# Patient Record
Sex: FEMALE | Race: WHITE | NOT HISPANIC OR LATINO | Employment: FULL TIME | URBAN - METROPOLITAN AREA
[De-identification: names, ages, dates, MRNs, and addresses within clinical notes are randomized per-mention and may not be internally consistent; named-entity substitution may affect disease eponyms.]

---

## 2019-01-31 ENCOUNTER — HOSPITAL ENCOUNTER (OUTPATIENT)
Dept: OTHER | Facility: HOSPITAL | Age: 39
Setting detail: RECURRING SERIES
Discharge: HOME | End: 2019-03-14
Attending: ORTHOPAEDIC SURGERY | Admitting: ORTHOPAEDIC SURGERY

## 2019-05-10 ENCOUNTER — TELEPHONE (OUTPATIENT)
Dept: CARDIOLOGY | Facility: CLINIC | Age: 39
End: 2019-05-10

## 2019-05-10 NOTE — TELEPHONE ENCOUNTER
Patient called stating she spoke with Dr. Felix at her last visit about starting adhd medication. She states he told her either adderrall or strattera would be ok to start. She needs the ok faxed to Carrie Cortez at [phone]  ATTN Carrie Cortez     Patients [phone]

## 2020-01-13 ENCOUNTER — OFFICE VISIT (OUTPATIENT)
Dept: CARDIOLOGY | Facility: CLINIC | Age: 40
End: 2020-01-13

## 2020-01-13 VITALS
SYSTOLIC BLOOD PRESSURE: 132 MMHG | WEIGHT: 188 LBS | HEART RATE: 101 BPM | DIASTOLIC BLOOD PRESSURE: 93 MMHG | OXYGEN SATURATION: 98 %

## 2020-01-13 DIAGNOSIS — F90.8 ATTENTION DEFICIT HYPERACTIVITY DISORDER (ADHD), OTHER TYPE: ICD-10-CM

## 2020-01-13 DIAGNOSIS — R00.2 PALPITATIONS: Primary | ICD-10-CM

## 2020-01-13 DIAGNOSIS — I47.1 ATRIAL TACHYCARDIA (HCC): ICD-10-CM

## 2020-01-13 DIAGNOSIS — E78.2 MIXED HYPERLIPIDEMIA: ICD-10-CM

## 2020-01-13 PROBLEM — E78.5 HYPERLIPIDEMIA: Status: ACTIVE | Noted: 2020-01-13

## 2020-01-13 PROBLEM — E11.42 DIABETIC PERIPHERAL NEUROPATHY: Status: ACTIVE | Noted: 2020-01-13

## 2020-01-13 PROBLEM — I10 HYPERTENSION: Status: ACTIVE | Noted: 2020-01-13

## 2020-01-13 PROBLEM — E80.20 PORPHYRIA: Status: ACTIVE | Noted: 2018-02-10

## 2020-01-13 PROBLEM — E11.9 TYPE 2 DIABETES MELLITUS (HCC): Status: ACTIVE | Noted: 2020-01-13

## 2020-01-13 PROCEDURE — 93000 ELECTROCARDIOGRAM COMPLETE: CPT | Performed by: INTERNAL MEDICINE

## 2020-01-13 PROCEDURE — 99204 OFFICE O/P NEW MOD 45 MIN: CPT | Performed by: INTERNAL MEDICINE

## 2020-01-13 RX ORDER — SIMVASTATIN 10 MG
10 TABLET ORAL DAILY
COMMUNITY

## 2020-01-13 RX ORDER — TOPIRAMATE 100 MG/1
CAPSULE, EXTENDED RELEASE ORAL
COMMUNITY
Start: 2020-01-06

## 2020-01-13 RX ORDER — NABUMETONE 750 MG/1
TABLET, FILM COATED ORAL
COMMUNITY
Start: 2020-01-05

## 2020-01-13 RX ORDER — ALBUTEROL SULFATE 90 UG/1
2 AEROSOL, METERED RESPIRATORY (INHALATION)
COMMUNITY
Start: 2019-03-27

## 2020-01-13 RX ORDER — TOPIRAMATE 100 MG/1
CAPSULE, EXTENDED RELEASE ORAL
COMMUNITY

## 2020-01-13 RX ORDER — IBUPROFEN 200 MG
950 CAPSULE ORAL
COMMUNITY
Start: 2018-12-14

## 2020-01-13 RX ORDER — CYCLOBENZAPRINE HCL 10 MG
TABLET ORAL
COMMUNITY
Start: 2019-12-26

## 2020-01-13 RX ORDER — LISINOPRIL 5 MG/1
TABLET ORAL
COMMUNITY
Start: 2019-10-25

## 2020-01-13 RX ORDER — SITAGLIPTIN AND METFORMIN HYDROCHLORIDE 1000; 50 MG/1; MG/1
TABLET, FILM COATED ORAL
COMMUNITY
Start: 2019-12-27

## 2020-01-13 RX ORDER — GABAPENTIN 100 MG/1
CAPSULE ORAL 3 TIMES DAILY
COMMUNITY
Start: 2019-12-29

## 2020-01-13 RX ORDER — METHYLPHENIDATE HYDROCHLORIDE 27 MG/1
27 TABLET ORAL EVERY MORNING
COMMUNITY

## 2020-01-13 NOTE — PROGRESS NOTES
CC--palpitations and fatigue    Sub--39-year-old female patient has history of palpitations and fatigue and underwent a prior EP study and was felt to have poorly functioning sinus node and from her description looks like she had  attempted EP study for sinus node modification which was not done as per her description  She has history of ADHD, hypertension and diabetes  She also has porphyria cutanea tarda  She has symptoms of peripheral neuropathy on gabapentin  She also has hyperlipidemia  She denies any edema of her feet or syncope or unusual angina or dyspnea  She is awaiting orthopedic surgery for her shoulder        Past Medical History:   Diagnosis Date   • DDD (degenerative disc disease), cervical    • Diabetes mellitus (CMS/HCC)    • PCOS (polycystic ovarian syndrome)      Past Surgical History:   Procedure Laterality Date   • CERVICAL SPINE SURGERY       Family History   Problem Relation Age of Onset   • No Known Problems Mother    • No Known Problems Father    • No Known Problems Sister    • No Known Problems Brother    • No Known Problems Maternal Aunt    • No Known Problems Maternal Uncle    • No Known Problems Paternal Aunt    • No Known Problems Paternal Uncle    • No Known Problems Maternal Grandmother    • No Known Problems Maternal Grandfather    • No Known Problems Paternal Grandmother    • No Known Problems Paternal Grandfather    • No Known Problems Other    • Anemia Neg Hx    • Arrhythmia Neg Hx    • Asthma Neg Hx    • Clotting disorder Neg Hx    • Fainting Neg Hx    • Heart attack Neg Hx    • Heart disease Neg Hx    • Heart failure Neg Hx    • Hyperlipidemia Neg Hx    • Hypertension Neg Hx      Social History     Tobacco Use   • Smoking status: Former Smoker     Types: Electronic Cigarette   • Smokeless tobacco: Never Used   Substance Use Topics   • Alcohol use: Yes   • Drug use: Never       (Not in a hospital admission)  Allergies:  Adhesive tape    Review of Systems   General:  positive for  fatigue and tiredness  Eyes: No redness  Cardiovascular: No chest pain, no palpitations  Respiratory:   no shortness of breath  Gastrointestinal: No nausea or vomiting, bleeding  Genitourinary: no hematuria or dysuria  Musculoskeletal: No arthralgia or myalgia  Skin: No rash  Neurologic: No numbness, tingling, syncope  Hematologic/Lymphatic: No abnormal bleeding      Physical Exam  VITALS REVIEWED--blood pressure 132/93 pulse rate is 101 patient is afebrile respiration 12 times a minute  EKG shows low atrial rhythm heart rate of 101 VA interval 137 QRS of 73 QTC of 424, no significant EKG changes compared to prior EKG and EKG indication includes history of atrial arrhythmias and fatigue    General:      well developed, well nourished, in no acute distress.    Head:      normocephalic and atraumatic.    Eyes:      PERRL/EOM intact, conjunctiva and sclera clear with out nystagmus.    Neck:      no masses, thyromegaly,  trachea central with normal respiratory effort and PMI displaced laterally  Lungs:      clear bilaterally to auscultation.    Heart:       Regular rhythm  without any murmurs gallops or rubs  Msk:      no deformity or scoliosis noted of thoracic or lumbar spine.    Pulses:      pulses normal in all 4 extremities.    Extremities:       no cyanosis or clubbing--trace left pedal edema and trace right pedal edema.    Neurologic:      no focal deficits.   alert oriented x3  Skin:      intact without lesions or rashes.    Psych:      alert and cooperative; normal mood and affect; normal attention span and concentration.        Assessment plan    EKG consistent with low atrial rhythm likely from sinus node dysfunction  Patient may have history of inappropriate sinus tachycardia in the past  She had a prior EP study and was found to have sinus node dysfunction as per her description and henceforth has a low atrial rhythm  No other cardiac symptoms apart from fatigue  Patient reassured  Patient is cleared for  noncardiac surgery and she is low risk for any perioperative complications  History of ADHD  History of diabetes and peripheral neuropathy  History of porphyria cutanea tarda    Electronically signed by Mando Phillips MD, 01/13/20, 5:17 PM.

## 2020-08-05 ENCOUNTER — OFFICE (AMBULATORY)
Dept: URBAN - METROPOLITAN AREA CLINIC 64 | Facility: CLINIC | Age: 40
End: 2020-08-05

## 2020-08-05 VITALS
SYSTOLIC BLOOD PRESSURE: 127 MMHG | HEART RATE: 104 BPM | WEIGHT: 176 LBS | DIASTOLIC BLOOD PRESSURE: 89 MMHG | HEIGHT: 70 IN

## 2020-08-05 DIAGNOSIS — R19.4 CHANGE IN BOWEL HABIT: ICD-10-CM

## 2020-08-05 DIAGNOSIS — K59.00 CONSTIPATION, UNSPECIFIED: ICD-10-CM

## 2020-08-05 DIAGNOSIS — D64.9 ANEMIA, UNSPECIFIED: ICD-10-CM

## 2020-08-05 DIAGNOSIS — E80.1 PORPHYRIA CUTANEA TARDA: ICD-10-CM

## 2020-08-05 DIAGNOSIS — R11.0 NAUSEA: ICD-10-CM

## 2020-08-05 DIAGNOSIS — E11.9 TYPE 2 DIABETES MELLITUS WITHOUT COMPLICATIONS: ICD-10-CM

## 2020-08-05 DIAGNOSIS — Z20.5 CONTACT WITH AND (SUSPECTED) EXPOSURE TO VIRAL HEPATITIS: ICD-10-CM

## 2020-08-05 PROCEDURE — 99244 OFF/OP CNSLTJ NEW/EST MOD 40: CPT | Performed by: INTERNAL MEDICINE

## 2020-08-07 ENCOUNTER — OFFICE VISIT (OUTPATIENT)
Dept: CARDIOLOGY | Facility: CLINIC | Age: 40
End: 2020-08-07

## 2020-08-07 VITALS
WEIGHT: 176 LBS | HEART RATE: 91 BPM | DIASTOLIC BLOOD PRESSURE: 88 MMHG | SYSTOLIC BLOOD PRESSURE: 129 MMHG | OXYGEN SATURATION: 100 %

## 2020-08-07 DIAGNOSIS — I47.1 ATRIAL TACHYCARDIA (HCC): Primary | ICD-10-CM

## 2020-08-07 DIAGNOSIS — R00.2 PALPITATIONS: ICD-10-CM

## 2020-08-07 DIAGNOSIS — E78.2 MIXED HYPERLIPIDEMIA: ICD-10-CM

## 2020-08-07 PROCEDURE — 93000 ELECTROCARDIOGRAM COMPLETE: CPT | Performed by: INTERNAL MEDICINE

## 2020-08-07 PROCEDURE — 99213 OFFICE O/P EST LOW 20 MIN: CPT | Performed by: INTERNAL MEDICINE

## 2020-08-07 RX ORDER — ASCORBIC ACID 500 MG
500 TABLET ORAL DAILY
COMMUNITY

## 2020-08-07 RX ORDER — PRENATAL VIT/IRON FUM/FOLIC AC 27MG-0.8MG
TABLET ORAL DAILY
COMMUNITY

## 2020-08-07 RX ORDER — NAPROXEN SODIUM 220 MG
220 TABLET ORAL 2 TIMES DAILY PRN
COMMUNITY

## 2020-08-07 NOTE — PROGRESS NOTES
CC--palpitations and fatigue    Sub--40-year-old female patient has history of palpitations and fatigue and underwent a prior EP study and was felt to have poorly functioning sinus node and from her description looks like she had  attempted EP study for sinus node modification which was not done as per her description--she comes in for 6-month follow-up with no new symptoms  She has history of ADHD, hypertension and diabetes  She also has porphyria cutanea tarda  She has symptoms of peripheral neuropathy on gabapentin  She also has hyperlipidemia  She denies any edema of her feet or syncope or unusual angina or dyspnea  She is awaiting orthopedic surgery for her shoulder  Patient denies any syncope or unusual dyspnea and has mild dependent edema with current intake of medications  She has some nonspecific GI symptoms and is awaiting a colonoscopy  Patient has been a diabetic for 24 years        Past Medical History:   Diagnosis Date   • DDD (degenerative disc disease), cervical    • Diabetes mellitus (CMS/HCC)    • PCOS (polycystic ovarian syndrome)      Past Surgical History:   Procedure Laterality Date   • CERVICAL SPINE SURGERY       Family History   Problem Relation Age of Onset   • No Known Problems Mother    • No Known Problems Father    • No Known Problems Sister    • No Known Problems Brother    • No Known Problems Maternal Aunt    • No Known Problems Maternal Uncle    • No Known Problems Paternal Aunt    • No Known Problems Paternal Uncle    • No Known Problems Maternal Grandmother    • No Known Problems Maternal Grandfather    • No Known Problems Paternal Grandmother    • No Known Problems Paternal Grandfather    • No Known Problems Other    • Anemia Neg Hx    • Arrhythmia Neg Hx    • Asthma Neg Hx    • Clotting disorder Neg Hx    • Fainting Neg Hx    • Heart attack Neg Hx    • Heart disease Neg Hx    • Heart failure Neg Hx    • Hyperlipidemia Neg Hx    • Hypertension Neg Hx        Review of Systems    General:  positive for fatigue and tiredness  Eyes: No redness  Cardiovascular: No chest pain, no palpitations  Respiratory:   no shortness of breath  Gastrointestinal: No nausea or vomiting, bleeding  Genitourinary: no hematuria or dysuria  Musculoskeletal: No arthralgia or myalgia  Skin: No rash  Neurologic: No numbness, tingling, syncope  Hematologic/Lymphatic: No abnormal bleeding      Physical Exam  VITALS REVIEWED--blood pressure 129/88 pulse rate is 91 patient is afebrile respiration 12 times a minute  EKG shows low atrial rhythm heart rate of 89 NE interval 113 QRS of 94 QTC of 447, no significant EKG changes compared to prior EKG and EKG indication includes history of atrial arrhythmias and fatigue    General:      well developed, well nourished, in no acute distress.    Head:      normocephalic and atraumatic.    Eyes:      PERRL/EOM intact, conjunctiva and sclera clear with out nystagmus.    Neck:      no masses, thyromegaly,  trachea central with normal respiratory effort and PMI displaced laterally  Lungs:      clear bilaterally to auscultation.    Heart:       Regular rhythm  without any murmurs gallops or rubs  Msk:      no deformity or scoliosis noted of thoracic or lumbar spine.    Pulses:      pulses normal in all 4 extremities.    Extremities:       no cyanosis or clubbing--trace left pedal edema and trace right pedal edema.    Neurologic:      no focal deficits.   alert oriented x3  Skin:      intact without lesions or rashes.    Psych:      alert and cooperative; normal mood and affect; normal attention span and concentration.        Assessment plan    EKG consistent with low atrial rhythm likely from sinus node dysfunction  Patient may have history of inappropriate sinus tachycardia in the past  She had a prior EP study and was found to have sinus node dysfunction as per her description and henceforth has a low atrial rhythm  No other cardiac symptoms apart from fatigue  Patient  reassured  Patient is cleared for noncardiac procedures like colonoscopy   History of ADHD  History of diabetes and peripheral neuropathy  History of porphyria cutanea tarda  Follow-up in 1 year    Electronically signed by Mando Phillips MD, 08/07/20, 10:16 AM.

## 2020-08-31 ENCOUNTER — ON CAMPUS - OUTPATIENT (AMBULATORY)
Dept: URBAN - METROPOLITAN AREA HOSPITAL 2 | Facility: HOSPITAL | Age: 40
End: 2020-08-31

## 2020-08-31 ENCOUNTER — OFFICE (AMBULATORY)
Dept: URBAN - METROPOLITAN AREA PATHOLOGY 4 | Facility: PATHOLOGY | Age: 40
End: 2020-08-31
Payer: COMMERCIAL

## 2020-08-31 VITALS
SYSTOLIC BLOOD PRESSURE: 104 MMHG | HEART RATE: 112 BPM | DIASTOLIC BLOOD PRESSURE: 63 MMHG | HEART RATE: 110 BPM | DIASTOLIC BLOOD PRESSURE: 68 MMHG | HEART RATE: 123 BPM | OXYGEN SATURATION: 100 % | DIASTOLIC BLOOD PRESSURE: 79 MMHG | SYSTOLIC BLOOD PRESSURE: 116 MMHG | RESPIRATION RATE: 16 BRPM | SYSTOLIC BLOOD PRESSURE: 103 MMHG | DIASTOLIC BLOOD PRESSURE: 82 MMHG | SYSTOLIC BLOOD PRESSURE: 98 MMHG | SYSTOLIC BLOOD PRESSURE: 109 MMHG | TEMPERATURE: 97.7 F | SYSTOLIC BLOOD PRESSURE: 122 MMHG | HEART RATE: 111 BPM | HEIGHT: 70 IN | SYSTOLIC BLOOD PRESSURE: 113 MMHG | OXYGEN SATURATION: 98 % | RESPIRATION RATE: 18 BRPM | OXYGEN SATURATION: 99 % | DIASTOLIC BLOOD PRESSURE: 67 MMHG | SYSTOLIC BLOOD PRESSURE: 107 MMHG | DIASTOLIC BLOOD PRESSURE: 77 MMHG | DIASTOLIC BLOOD PRESSURE: 60 MMHG | SYSTOLIC BLOOD PRESSURE: 102 MMHG | WEIGHT: 170 LBS | HEART RATE: 113 BPM | OXYGEN SATURATION: 96 % | DIASTOLIC BLOOD PRESSURE: 80 MMHG | HEART RATE: 114 BPM | RESPIRATION RATE: 20 BRPM | HEART RATE: 96 BPM

## 2020-08-31 DIAGNOSIS — D64.9 ANEMIA, UNSPECIFIED: ICD-10-CM

## 2020-08-31 DIAGNOSIS — K64.0 FIRST DEGREE HEMORRHOIDS: ICD-10-CM

## 2020-08-31 DIAGNOSIS — R19.4 CHANGE IN BOWEL HABIT: ICD-10-CM

## 2020-08-31 DIAGNOSIS — K59.00 CONSTIPATION, UNSPECIFIED: ICD-10-CM

## 2020-08-31 DIAGNOSIS — K20.8 OTHER ESOPHAGITIS: ICD-10-CM

## 2020-08-31 DIAGNOSIS — K31.89 OTHER DISEASES OF STOMACH AND DUODENUM: ICD-10-CM

## 2020-08-31 DIAGNOSIS — R11.0 NAUSEA: ICD-10-CM

## 2020-08-31 LAB
GI HISTOLOGY: A. UNSPECIFIED: (no result)
GI HISTOLOGY: B. SELECT: (no result)
GI HISTOLOGY: PDF REPORT: (no result)

## 2020-08-31 PROCEDURE — 88305 TISSUE EXAM BY PATHOLOGIST: CPT | Performed by: INTERNAL MEDICINE

## 2020-08-31 PROCEDURE — 43239 EGD BIOPSY SINGLE/MULTIPLE: CPT | Performed by: INTERNAL MEDICINE

## 2020-08-31 PROCEDURE — 88342 IMHCHEM/IMCYTCHM 1ST ANTB: CPT | Performed by: INTERNAL MEDICINE

## 2020-08-31 PROCEDURE — 45378 DIAGNOSTIC COLONOSCOPY: CPT | Performed by: INTERNAL MEDICINE

## 2020-08-31 RX ORDER — PANTOPRAZOLE SODIUM 40 MG/1
40 TABLET, DELAYED RELEASE ORAL
Qty: 90 | Refills: 3 | Status: ACTIVE
Start: 2020-08-31

## 2021-10-28 ENCOUNTER — TRANSCRIBE ORDERS (OUTPATIENT)
Dept: ADMINISTRATIVE | Facility: HOSPITAL | Age: 41
End: 2021-10-28

## 2021-10-28 DIAGNOSIS — Z12.31 VISIT FOR SCREENING MAMMOGRAM: Primary | ICD-10-CM

## 2023-09-12 ENCOUNTER — TRANSCRIBE ORDERS (OUTPATIENT)
Dept: ADMINISTRATIVE | Facility: HOSPITAL | Age: 43
End: 2023-09-12
Payer: COMMERCIAL

## 2023-09-12 ENCOUNTER — HOSPITAL ENCOUNTER (OUTPATIENT)
Dept: CT IMAGING | Facility: HOSPITAL | Age: 43
Discharge: HOME | End: 2023-09-12
Payer: COMMERCIAL

## 2023-09-12 DIAGNOSIS — R79.89 HYPOURICEMIA: Primary | ICD-10-CM

## 2023-09-12 DIAGNOSIS — R79.89 HYPOURICEMIA: ICD-10-CM

## 2023-09-12 LAB
CREAT BLDA-MCNC: 0.5 MG/DL (ref 0.6–1.3)
EGFRCR SERPLBLD CKD-EPI 2021: 118 ML/MIN/1.73

## 2023-09-12 PROCEDURE — 25510000001 IOPAMIDOL PER 1 ML

## 2023-09-12 PROCEDURE — 71275 CT ANGIOGRAPHY CHEST: CPT

## 2023-09-12 PROCEDURE — 82565 ASSAY OF CREATININE: CPT

## 2023-09-12 RX ADMIN — IOPAMIDOL 100 ML: 755 INJECTION, SOLUTION INTRAVENOUS at 11:26

## 2023-09-17 ENCOUNTER — HOSPITAL ENCOUNTER (OUTPATIENT)
Facility: HOSPITAL | Age: 43
Setting detail: OBSERVATION
Discharge: HOME | End: 2023-09-20
Attending: EMERGENCY MEDICINE | Admitting: INTERNAL MEDICINE
Payer: COMMERCIAL

## 2023-09-17 DIAGNOSIS — E10.65 TYPE 1 DIABETES MELLITUS WITH HYPERGLYCEMIA: Primary | ICD-10-CM

## 2023-09-17 DIAGNOSIS — N20.0 KIDNEY STONE: ICD-10-CM

## 2023-09-17 DIAGNOSIS — N39.0 ACUTE URINARY TRACT INFECTION: ICD-10-CM

## 2023-09-17 DIAGNOSIS — L25.9 CONTACT DERMATITIS, UNSPECIFIED CONTACT DERMATITIS TYPE, UNSPECIFIED TRIGGER: ICD-10-CM

## 2023-09-17 PROBLEM — E87.6 HYPOKALEMIA: Status: ACTIVE | Noted: 2023-09-17

## 2023-09-17 PROBLEM — E11.65 DIABETES MELLITUS WITH HYPERGLYCEMIA: Status: ACTIVE | Noted: 2023-09-17

## 2023-09-17 LAB
ALBUMIN SERPL-MCNC: 4.4 G/DL (ref 3.5–5.2)
ALBUMIN/GLOB SERPL: 1.6 G/DL
ALP SERPL-CCNC: 66 U/L (ref 39–117)
ALT SERPL W P-5'-P-CCNC: 11 U/L (ref 1–33)
ANION GAP SERPL CALCULATED.3IONS-SCNC: 14 MMOL/L (ref 5–15)
AST SERPL-CCNC: 11 U/L (ref 1–32)
BACTERIA UR QL AUTO: ABNORMAL /HPF
BASOPHILS # BLD AUTO: 0.1 10*3/MM3 (ref 0–0.2)
BASOPHILS NFR BLD AUTO: 0.8 % (ref 0–1.5)
BILIRUB SERPL-MCNC: 0.2 MG/DL (ref 0–1.2)
BILIRUB UR QL STRIP: NEGATIVE
BUN SERPL-MCNC: 19 MG/DL (ref 6–20)
BUN/CREAT SERPL: 25 (ref 7–25)
CALCIUM SPEC-SCNC: 9.7 MG/DL (ref 8.6–10.5)
CHLORIDE SERPL-SCNC: 101 MMOL/L (ref 98–107)
CLARITY UR: CLEAR
CO2 SERPL-SCNC: 23 MMOL/L (ref 22–29)
COLOR UR: YELLOW
CREAT SERPL-MCNC: 0.76 MG/DL (ref 0.57–1)
DEPRECATED RDW RBC AUTO: 47.7 FL (ref 37–54)
EGFRCR SERPLBLD CKD-EPI 2021: 98.6 ML/MIN/1.73
EOSINOPHIL # BLD AUTO: 0 10*3/MM3 (ref 0–0.4)
EOSINOPHIL NFR BLD AUTO: 0.1 % (ref 0.3–6.2)
ERYTHROCYTE [DISTWIDTH] IN BLOOD BY AUTOMATED COUNT: 15.5 % (ref 12.3–15.4)
GLOBULIN UR ELPH-MCNC: 2.8 GM/DL
GLUCOSE SERPL-MCNC: 366 MG/DL (ref 65–99)
GLUCOSE UR STRIP-MCNC: ABNORMAL MG/DL
HBA1C MFR BLD: 10.1 % (ref 4.8–5.6)
HCT VFR BLD AUTO: 36.2 % (ref 34–46.6)
HGB BLD-MCNC: 11.8 G/DL (ref 12–15.9)
HGB UR QL STRIP.AUTO: ABNORMAL
HYALINE CASTS UR QL AUTO: ABNORMAL /LPF
KETONES UR QL STRIP: ABNORMAL
LEUKOCYTE ESTERASE UR QL STRIP.AUTO: ABNORMAL
LYMPHOCYTES # BLD AUTO: 0.8 10*3/MM3 (ref 0.7–3.1)
LYMPHOCYTES NFR BLD AUTO: 7.1 % (ref 19.6–45.3)
MCH RBC QN AUTO: 26.7 PG (ref 26.6–33)
MCHC RBC AUTO-ENTMCNC: 32.5 G/DL (ref 31.5–35.7)
MCV RBC AUTO: 81.9 FL (ref 79–97)
MONOCYTES # BLD AUTO: 0.4 10*3/MM3 (ref 0.1–0.9)
MONOCYTES NFR BLD AUTO: 3.8 % (ref 5–12)
NEUTROPHILS NFR BLD AUTO: 10.2 10*3/MM3 (ref 1.7–7)
NEUTROPHILS NFR BLD AUTO: 88.2 % (ref 42.7–76)
NITRITE UR QL STRIP: NEGATIVE
NRBC BLD AUTO-RTO: 0 /100 WBC (ref 0–0.2)
PH UR STRIP.AUTO: 7.5 [PH] (ref 5–8)
PLATELET # BLD AUTO: 553 10*3/MM3 (ref 140–450)
PMV BLD AUTO: 7.8 FL (ref 6–12)
POTASSIUM SERPL-SCNC: 3.4 MMOL/L (ref 3.5–5.2)
PROT SERPL-MCNC: 7.2 G/DL (ref 6–8.5)
PROT UR QL STRIP: ABNORMAL
RBC # BLD AUTO: 4.42 10*6/MM3 (ref 3.77–5.28)
RBC # UR STRIP: ABNORMAL /HPF
REF LAB TEST METHOD: ABNORMAL
SODIUM SERPL-SCNC: 138 MMOL/L (ref 136–145)
SP GR UR STRIP: 1.03 (ref 1–1.03)
SQUAMOUS #/AREA URNS HPF: ABNORMAL /HPF
UROBILINOGEN UR QL STRIP: ABNORMAL
WBC # UR STRIP: ABNORMAL /HPF
WBC NRBC COR # BLD: 11.5 10*3/MM3 (ref 3.4–10.8)

## 2023-09-17 PROCEDURE — 63710000001 INSULIN LISPRO (HUMAN) PER 5 UNITS: Performed by: EMERGENCY MEDICINE

## 2023-09-17 PROCEDURE — 87077 CULTURE AEROBIC IDENTIFY: CPT | Performed by: EMERGENCY MEDICINE

## 2023-09-17 PROCEDURE — 81001 URINALYSIS AUTO W/SCOPE: CPT | Performed by: EMERGENCY MEDICINE

## 2023-09-17 PROCEDURE — 80053 COMPREHEN METABOLIC PANEL: CPT | Performed by: EMERGENCY MEDICINE

## 2023-09-17 PROCEDURE — G0378 HOSPITAL OBSERVATION PER HR: HCPCS

## 2023-09-17 PROCEDURE — 86431 RHEUMATOID FACTOR QUANT: CPT | Performed by: EMERGENCY MEDICINE

## 2023-09-17 PROCEDURE — 87086 URINE CULTURE/COLONY COUNT: CPT | Performed by: EMERGENCY MEDICINE

## 2023-09-17 PROCEDURE — 87186 SC STD MICRODIL/AGAR DIL: CPT | Performed by: EMERGENCY MEDICINE

## 2023-09-17 PROCEDURE — 83036 HEMOGLOBIN GLYCOSYLATED A1C: CPT | Performed by: EMERGENCY MEDICINE

## 2023-09-17 PROCEDURE — 25010000002 METHYLPREDNISOLONE PER 125 MG: Performed by: EMERGENCY MEDICINE

## 2023-09-17 PROCEDURE — 96375 TX/PRO/DX INJ NEW DRUG ADDON: CPT

## 2023-09-17 PROCEDURE — 96374 THER/PROPH/DIAG INJ IV PUSH: CPT

## 2023-09-17 PROCEDURE — 86665 EPSTEIN-BARR CAPSID VCA: CPT | Performed by: EMERGENCY MEDICINE

## 2023-09-17 PROCEDURE — 25010000002 DIPHENHYDRAMINE PER 50 MG: Performed by: EMERGENCY MEDICINE

## 2023-09-17 PROCEDURE — 86038 ANTINUCLEAR ANTIBODIES: CPT | Performed by: EMERGENCY MEDICINE

## 2023-09-17 PROCEDURE — 85025 COMPLETE CBC W/AUTO DIFF WBC: CPT | Performed by: EMERGENCY MEDICINE

## 2023-09-17 PROCEDURE — 99284 EMERGENCY DEPT VISIT MOD MDM: CPT

## 2023-09-17 RX ORDER — FAMOTIDINE 10 MG/ML
20 INJECTION, SOLUTION INTRAVENOUS ONCE
Status: COMPLETED | OUTPATIENT
Start: 2023-09-17 | End: 2023-09-17

## 2023-09-17 RX ORDER — DIPHENHYDRAMINE HYDROCHLORIDE 50 MG/ML
50 INJECTION INTRAMUSCULAR; INTRAVENOUS ONCE
Status: COMPLETED | OUTPATIENT
Start: 2023-09-17 | End: 2023-09-17

## 2023-09-17 RX ORDER — METHYLPREDNISOLONE SODIUM SUCCINATE 125 MG/2ML
60 INJECTION, POWDER, LYOPHILIZED, FOR SOLUTION INTRAMUSCULAR; INTRAVENOUS ONCE
Status: COMPLETED | OUTPATIENT
Start: 2023-09-17 | End: 2023-09-17

## 2023-09-17 RX ORDER — POTASSIUM CHLORIDE 20 MEQ/1
40 TABLET, EXTENDED RELEASE ORAL ONCE
Status: COMPLETED | OUTPATIENT
Start: 2023-09-17 | End: 2023-09-17

## 2023-09-17 RX ORDER — INSULIN LISPRO 100 [IU]/ML
12 INJECTION, SOLUTION INTRAVENOUS; SUBCUTANEOUS ONCE
Status: COMPLETED | OUTPATIENT
Start: 2023-09-17 | End: 2023-09-17

## 2023-09-17 RX ADMIN — DIPHENHYDRAMINE HYDROCHLORIDE 50 MG: 50 INJECTION, SOLUTION INTRAMUSCULAR; INTRAVENOUS at 21:37

## 2023-09-17 RX ADMIN — POTASSIUM CHLORIDE 40 MEQ: 1500 TABLET, EXTENDED RELEASE ORAL at 23:50

## 2023-09-17 RX ADMIN — INSULIN LISPRO 12 UNITS: 100 INJECTION, SOLUTION INTRAVENOUS; SUBCUTANEOUS at 23:50

## 2023-09-17 RX ADMIN — FAMOTIDINE 20 MG: 10 INJECTION INTRAVENOUS at 21:37

## 2023-09-17 RX ADMIN — METHYLPREDNISOLONE SODIUM SUCCINATE 60 MG: 125 INJECTION, POWDER, FOR SOLUTION INTRAMUSCULAR; INTRAVENOUS at 23:49

## 2023-09-18 ENCOUNTER — APPOINTMENT (OUTPATIENT)
Dept: CT IMAGING | Facility: HOSPITAL | Age: 43
End: 2023-09-18
Payer: COMMERCIAL

## 2023-09-18 LAB
ANA SER QL: NEGATIVE
ANION GAP SERPL CALCULATED.3IONS-SCNC: 13 MMOL/L (ref 5–15)
BASOPHILS # BLD AUTO: 0.1 10*3/MM3 (ref 0–0.2)
BASOPHILS NFR BLD AUTO: 0.9 % (ref 0–1.5)
BUN SERPL-MCNC: 21 MG/DL (ref 6–20)
BUN/CREAT SERPL: 31.3 (ref 7–25)
CALCIUM SPEC-SCNC: 9 MG/DL (ref 8.6–10.5)
CHLORIDE SERPL-SCNC: 106 MMOL/L (ref 98–107)
CHROMATIN AB SERPL-ACNC: <10 IU/ML (ref 0–14)
CO2 SERPL-SCNC: 24 MMOL/L (ref 22–29)
CREAT SERPL-MCNC: 0.67 MG/DL (ref 0.57–1)
DEPRECATED RDW RBC AUTO: 45.1 FL (ref 37–54)
EGFRCR SERPLBLD CKD-EPI 2021: 110 ML/MIN/1.73
EOSINOPHIL # BLD AUTO: 0 10*3/MM3 (ref 0–0.4)
EOSINOPHIL NFR BLD AUTO: 0.2 % (ref 0.3–6.2)
ERYTHROCYTE [DISTWIDTH] IN BLOOD BY AUTOMATED COUNT: 14.8 % (ref 12.3–15.4)
GLUCOSE BLDC GLUCOMTR-MCNC: 163 MG/DL (ref 70–105)
GLUCOSE BLDC GLUCOMTR-MCNC: 275 MG/DL (ref 70–105)
GLUCOSE BLDC GLUCOMTR-MCNC: 315 MG/DL (ref 70–105)
GLUCOSE BLDC GLUCOMTR-MCNC: 93 MG/DL (ref 70–105)
GLUCOSE SERPL-MCNC: 201 MG/DL (ref 65–99)
HCT VFR BLD AUTO: 34.1 % (ref 34–46.6)
HGB BLD-MCNC: 11 G/DL (ref 12–15.9)
LYMPHOCYTES # BLD AUTO: 1.5 10*3/MM3 (ref 0.7–3.1)
LYMPHOCYTES NFR BLD AUTO: 11.9 % (ref 19.6–45.3)
MCH RBC QN AUTO: 26.5 PG (ref 26.6–33)
MCHC RBC AUTO-ENTMCNC: 32.3 G/DL (ref 31.5–35.7)
MCV RBC AUTO: 82.1 FL (ref 79–97)
MONOCYTES # BLD AUTO: 0.3 10*3/MM3 (ref 0.1–0.9)
MONOCYTES NFR BLD AUTO: 2.6 % (ref 5–12)
NEUTROPHILS NFR BLD AUTO: 10.9 10*3/MM3 (ref 1.7–7)
NEUTROPHILS NFR BLD AUTO: 84.4 % (ref 42.7–76)
NRBC BLD AUTO-RTO: 0.1 /100 WBC (ref 0–0.2)
PLATELET # BLD AUTO: 545 10*3/MM3 (ref 140–450)
PMV BLD AUTO: 8.1 FL (ref 6–12)
POTASSIUM SERPL-SCNC: 3.7 MMOL/L (ref 3.5–5.2)
RBC # BLD AUTO: 4.16 10*6/MM3 (ref 3.77–5.28)
SODIUM SERPL-SCNC: 143 MMOL/L (ref 136–145)
WBC NRBC COR # BLD: 13 10*3/MM3 (ref 3.4–10.8)

## 2023-09-18 PROCEDURE — 82948 REAGENT STRIP/BLOOD GLUCOSE: CPT

## 2023-09-18 PROCEDURE — 25010000002 CEFTRIAXONE PER 250 MG

## 2023-09-18 PROCEDURE — 63710000001 INSULIN LISPRO (HUMAN) PER 5 UNITS: Performed by: INTERNAL MEDICINE

## 2023-09-18 PROCEDURE — 80048 BASIC METABOLIC PNL TOTAL CA: CPT

## 2023-09-18 PROCEDURE — 85025 COMPLETE CBC W/AUTO DIFF WBC: CPT

## 2023-09-18 PROCEDURE — 25010000002 LEVOFLOXACIN PER 250 MG: Performed by: INTERNAL MEDICINE

## 2023-09-18 PROCEDURE — 25010000002 DIPHENHYDRAMINE PER 50 MG

## 2023-09-18 PROCEDURE — 63710000001 INSULIN GLARGINE PER 5 UNITS: Performed by: INTERNAL MEDICINE

## 2023-09-18 PROCEDURE — G0378 HOSPITAL OBSERVATION PER HR: HCPCS

## 2023-09-18 PROCEDURE — 96376 TX/PRO/DX INJ SAME DRUG ADON: CPT

## 2023-09-18 PROCEDURE — 99204 OFFICE O/P NEW MOD 45 MIN: CPT | Performed by: INTERNAL MEDICINE

## 2023-09-18 PROCEDURE — 63710000001 INSULIN LISPRO (HUMAN) PER 5 UNITS

## 2023-09-18 PROCEDURE — 74176 CT ABD & PELVIS W/O CONTRAST: CPT

## 2023-09-18 RX ORDER — SODIUM CHLORIDE 0.9 % (FLUSH) 0.9 %
10 SYRINGE (ML) INJECTION AS NEEDED
Status: DISCONTINUED | OUTPATIENT
Start: 2023-09-18 | End: 2023-09-20 | Stop reason: HOSPADM

## 2023-09-18 RX ORDER — NICOTINE POLACRILEX 4 MG
15 LOZENGE BUCCAL
Status: DISCONTINUED | OUTPATIENT
Start: 2023-09-18 | End: 2023-09-18

## 2023-09-18 RX ORDER — POLYETHYLENE GLYCOL 3350 17 G/17G
17 POWDER, FOR SOLUTION ORAL DAILY PRN
Status: DISCONTINUED | OUTPATIENT
Start: 2023-09-18 | End: 2023-09-20 | Stop reason: HOSPADM

## 2023-09-18 RX ORDER — LEVOFLOXACIN 5 MG/ML
750 INJECTION, SOLUTION INTRAVENOUS EVERY 24 HOURS
Status: DISCONTINUED | OUTPATIENT
Start: 2023-09-18 | End: 2023-09-20 | Stop reason: HOSPADM

## 2023-09-18 RX ORDER — DIPHENHYDRAMINE HYDROCHLORIDE 50 MG/ML
25 INJECTION INTRAMUSCULAR; INTRAVENOUS EVERY 6 HOURS PRN
Status: DISCONTINUED | OUTPATIENT
Start: 2023-09-18 | End: 2023-09-20 | Stop reason: HOSPADM

## 2023-09-18 RX ORDER — DEXTROSE MONOHYDRATE 25 G/50ML
25 INJECTION, SOLUTION INTRAVENOUS
Status: DISCONTINUED | OUTPATIENT
Start: 2023-09-18 | End: 2023-09-20 | Stop reason: HOSPADM

## 2023-09-18 RX ORDER — SODIUM CHLORIDE 9 MG/ML
40 INJECTION, SOLUTION INTRAVENOUS AS NEEDED
Status: DISCONTINUED | OUTPATIENT
Start: 2023-09-18 | End: 2023-09-20 | Stop reason: HOSPADM

## 2023-09-18 RX ORDER — ROSUVASTATIN CALCIUM 20 MG/1
20 TABLET, COATED ORAL DAILY
COMMUNITY

## 2023-09-18 RX ORDER — INSULIN LISPRO 100 [IU]/ML
2-9 INJECTION, SOLUTION INTRAVENOUS; SUBCUTANEOUS
Status: DISCONTINUED | OUTPATIENT
Start: 2023-09-18 | End: 2023-09-18

## 2023-09-18 RX ORDER — BISACODYL 10 MG
10 SUPPOSITORY, RECTAL RECTAL DAILY PRN
Status: DISCONTINUED | OUTPATIENT
Start: 2023-09-18 | End: 2023-09-20 | Stop reason: HOSPADM

## 2023-09-18 RX ORDER — NAPROXEN 250 MG/1
250 TABLET ORAL 2 TIMES DAILY WITH MEALS
Status: DISCONTINUED | OUTPATIENT
Start: 2023-09-18 | End: 2023-09-20 | Stop reason: HOSPADM

## 2023-09-18 RX ORDER — BISACODYL 5 MG/1
5 TABLET, DELAYED RELEASE ORAL DAILY PRN
Status: DISCONTINUED | OUTPATIENT
Start: 2023-09-18 | End: 2023-09-20 | Stop reason: HOSPADM

## 2023-09-18 RX ORDER — FAMOTIDINE 20 MG/1
40 TABLET, FILM COATED ORAL DAILY
Status: DISCONTINUED | OUTPATIENT
Start: 2023-09-18 | End: 2023-09-18

## 2023-09-18 RX ORDER — INSULIN LISPRO 100 [IU]/ML
2-12 INJECTION, SOLUTION INTRAVENOUS; SUBCUTANEOUS
Status: DISCONTINUED | OUTPATIENT
Start: 2023-09-18 | End: 2023-09-20 | Stop reason: HOSPADM

## 2023-09-18 RX ORDER — SODIUM CHLORIDE 0.9 % (FLUSH) 0.9 %
10 SYRINGE (ML) INJECTION EVERY 12 HOURS SCHEDULED
Status: DISCONTINUED | OUTPATIENT
Start: 2023-09-18 | End: 2023-09-20 | Stop reason: HOSPADM

## 2023-09-18 RX ORDER — INSULIN LISPRO 100 [IU]/ML
8 INJECTION, SOLUTION INTRAVENOUS; SUBCUTANEOUS
Status: DISCONTINUED | OUTPATIENT
Start: 2023-09-18 | End: 2023-09-20

## 2023-09-18 RX ORDER — INSULIN LISPRO 100 [IU]/ML
3-14 INJECTION, SOLUTION INTRAVENOUS; SUBCUTANEOUS
Status: DISCONTINUED | OUTPATIENT
Start: 2023-09-18 | End: 2023-09-18

## 2023-09-18 RX ORDER — INSULIN GLARGINE 100 [IU]/ML
10 INJECTION, SOLUTION SUBCUTANEOUS NIGHTLY
Status: ON HOLD | COMMUNITY
End: 2023-09-20 | Stop reason: SDUPTHER

## 2023-09-18 RX ORDER — IBUPROFEN 600 MG/1
1 TABLET ORAL
Status: DISCONTINUED | OUTPATIENT
Start: 2023-09-18 | End: 2023-09-20 | Stop reason: HOSPADM

## 2023-09-18 RX ORDER — ACETAMINOPHEN 325 MG/1
650 TABLET ORAL EVERY 4 HOURS PRN
Status: DISCONTINUED | OUTPATIENT
Start: 2023-09-18 | End: 2023-09-20 | Stop reason: HOSPADM

## 2023-09-18 RX ORDER — DEXTROSE MONOHYDRATE 25 G/50ML
25 INJECTION, SOLUTION INTRAVENOUS
Status: DISCONTINUED | OUTPATIENT
Start: 2023-09-18 | End: 2023-09-18

## 2023-09-18 RX ORDER — ROSUVASTATIN CALCIUM 10 MG/1
20 TABLET, COATED ORAL NIGHTLY
Status: DISCONTINUED | OUTPATIENT
Start: 2023-09-18 | End: 2023-09-20 | Stop reason: HOSPADM

## 2023-09-18 RX ORDER — TIRZEPATIDE 5 MG/.5ML
INJECTION, SOLUTION SUBCUTANEOUS WEEKLY
COMMUNITY

## 2023-09-18 RX ORDER — CYCLOBENZAPRINE HCL 10 MG
10 TABLET ORAL NIGHTLY
Status: DISCONTINUED | OUTPATIENT
Start: 2023-09-18 | End: 2023-09-20 | Stop reason: HOSPADM

## 2023-09-18 RX ORDER — IBUPROFEN 600 MG/1
1 TABLET ORAL
Status: DISCONTINUED | OUTPATIENT
Start: 2023-09-18 | End: 2023-09-18

## 2023-09-18 RX ORDER — AMOXICILLIN 250 MG
2 CAPSULE ORAL 2 TIMES DAILY
Status: DISCONTINUED | OUTPATIENT
Start: 2023-09-18 | End: 2023-09-20 | Stop reason: HOSPADM

## 2023-09-18 RX ORDER — NICOTINE POLACRILEX 4 MG
15 LOZENGE BUCCAL
Status: DISCONTINUED | OUTPATIENT
Start: 2023-09-18 | End: 2023-09-20 | Stop reason: HOSPADM

## 2023-09-18 RX ORDER — NITROGLYCERIN 0.4 MG/1
0.4 TABLET SUBLINGUAL
Status: DISCONTINUED | OUTPATIENT
Start: 2023-09-18 | End: 2023-09-20 | Stop reason: HOSPADM

## 2023-09-18 RX ORDER — ONDANSETRON 2 MG/ML
4 INJECTION INTRAMUSCULAR; INTRAVENOUS EVERY 6 HOURS PRN
Status: DISCONTINUED | OUTPATIENT
Start: 2023-09-18 | End: 2023-09-20 | Stop reason: HOSPADM

## 2023-09-18 RX ORDER — GABAPENTIN 100 MG/1
100 CAPSULE ORAL 3 TIMES DAILY
Status: DISCONTINUED | OUTPATIENT
Start: 2023-09-18 | End: 2023-09-20 | Stop reason: HOSPADM

## 2023-09-18 RX ADMIN — NAPROXEN 250 MG: 250 TABLET ORAL at 18:51

## 2023-09-18 RX ADMIN — Medication 10 ML: at 21:02

## 2023-09-18 RX ADMIN — METFORMIN HYDROCHLORIDE 1000 MG: 500 TABLET, FILM COATED ORAL at 12:01

## 2023-09-18 RX ADMIN — Medication 10 ML: at 08:30

## 2023-09-18 RX ADMIN — DIPHENHYDRAMINE HYDROCHLORIDE 25 MG: 50 INJECTION, SOLUTION INTRAMUSCULAR; INTRAVENOUS at 21:05

## 2023-09-18 RX ADMIN — CEFTRIAXONE 1000 MG: 1 INJECTION, POWDER, FOR SOLUTION INTRAMUSCULAR; INTRAVENOUS at 01:04

## 2023-09-18 RX ADMIN — INSULIN LISPRO 8 UNITS: 100 INJECTION, SOLUTION INTRAVENOUS; SUBCUTANEOUS at 16:50

## 2023-09-18 RX ADMIN — INSULIN GLARGINE 25 UNITS: 100 INJECTION, SOLUTION SUBCUTANEOUS at 14:45

## 2023-09-18 RX ADMIN — FAMOTIDINE 40 MG: 20 TABLET, FILM COATED ORAL at 08:09

## 2023-09-18 RX ADMIN — ROSUVASTATIN 20 MG: 10 TABLET, FILM COATED ORAL at 21:02

## 2023-09-18 RX ADMIN — LEVOFLOXACIN 750 MG: 5 INJECTION, SOLUTION INTRAVENOUS at 14:51

## 2023-09-18 RX ADMIN — GABAPENTIN 100 MG: 100 CAPSULE ORAL at 16:39

## 2023-09-18 RX ADMIN — INSULIN LISPRO 2 UNITS: 100 INJECTION, SOLUTION INTRAVENOUS; SUBCUTANEOUS at 16:51

## 2023-09-18 RX ADMIN — SENNOSIDES AND DOCUSATE SODIUM 2 TABLET: 50; 8.6 TABLET ORAL at 08:09

## 2023-09-18 RX ADMIN — GABAPENTIN 100 MG: 100 CAPSULE ORAL at 12:01

## 2023-09-18 RX ADMIN — LINAGLIPTIN 5 MG: 5 TABLET, FILM COATED ORAL at 12:01

## 2023-09-18 RX ADMIN — INSULIN LISPRO 7 UNITS: 100 INJECTION, SOLUTION INTRAVENOUS; SUBCUTANEOUS at 08:09

## 2023-09-18 RX ADMIN — INSULIN LISPRO 8 UNITS: 100 INJECTION, SOLUTION INTRAVENOUS; SUBCUTANEOUS at 12:01

## 2023-09-18 RX ADMIN — METFORMIN HYDROCHLORIDE 1000 MG: 500 TABLET, FILM COATED ORAL at 16:50

## 2023-09-18 RX ADMIN — SENNOSIDES AND DOCUSATE SODIUM 2 TABLET: 50; 8.6 TABLET ORAL at 21:01

## 2023-09-18 RX ADMIN — GABAPENTIN 100 MG: 100 CAPSULE ORAL at 21:02

## 2023-09-18 RX ADMIN — CYCLOBENZAPRINE 10 MG: 10 TABLET, FILM COATED ORAL at 21:02

## 2023-09-18 NOTE — CONSULTS
Inpatient Endocrine Consult  Consultation requested by hospitalist team for uncontrolled type 2 diabetes  Patient Care Team:  Akash Weiner MD as PCP - General (Family Medicine)    Chief Complaint: Uncontrolled type 2 diabetes    HPI: This is a 43-year-old female with history of type 2 diabetes, history of PCOS in the past, porphyria, degenerative disc disease, hyperlipidemia presented after she started feeling swelling swelling in the face and irritation after she used a cream.  Patient has been having issues with polyuria, polydipsia with some fatigue and weight loss for the last several days to several weeks.  She was found to have significant hyperglycemia and is now requested to be seen for evaluation.    She tells me that home she is on Janumet for last 10 to 15 years, Mounjaro 5 mg once a week for last 1 year and also on Lantus 10 units subcu daily.  She tells me the blood sugars at home are running pretty high.  She is eating fair at this time.    Past Medical History:   Diagnosis Date    DDD (degenerative disc disease), cervical     Diabetes mellitus     PCOS (polycystic ovarian syndrome)     Porphyria        Social History     Socioeconomic History    Marital status: Single   Tobacco Use    Smoking status: Former     Types: Electronic Cigarette    Smokeless tobacco: Never   Vaping Use    Vaping Use: Former   Substance and Sexual Activity    Alcohol use: Yes     Comment: occassionally    Drug use: Never    Sexual activity: Defer       Family History   Problem Relation Age of Onset    No Known Problems Mother     No Known Problems Father     No Known Problems Sister     No Known Problems Brother     No Known Problems Maternal Aunt     No Known Problems Maternal Uncle     No Known Problems Paternal Aunt     No Known Problems Paternal Uncle     No Known Problems Maternal Grandmother     No Known Problems Maternal Grandfather     No Known Problems Paternal Grandmother     No Known Problems Paternal  Grandfather     No Known Problems Other     Anemia Neg Hx     Arrhythmia Neg Hx     Asthma Neg Hx     Clotting disorder Neg Hx     Fainting Neg Hx     Heart attack Neg Hx     Heart disease Neg Hx     Heart failure Neg Hx     Hyperlipidemia Neg Hx     Hypertension Neg Hx        Allergies   Allergen Reactions    Adhesive Tape Other (See Comments)       ROS:   Constitutional: Admit fatigue, tiredness.    Eyes:  Denies change in visual acuity   HENT:  Denies nasal congestion or sore throat   Respiratory: Denies cough, shortness of breath.   Cardiovascular:  Denies chest pain, edema   GI:  Denies abdominal pain, nausea, vomiting.   : Admit polyuria and polydipsia  Musculoskeletal:  Denies back pain or joint pain   Integument:  Denies dry skin, rash   Neurologic:  Denies headache, focal weakness or sensory changes   Endocrine: Admit polyuria and polydipsia  Psychiatric:  Denies depression or anxiety      Vitals:    09/18/23 1211   BP: 125/89   Pulse: 80   Resp: 15   Temp: 97.8 °F (36.6 °C)   SpO2: 100%      Body mass index is 23.17 kg/m².     Physical Exam:  GEN: NAD, conversant  EYES: EOMI, PERRL  NECK: no thyromegaly  CV: RRR  LUNG: CTA  SKIN: no rashes, no acanthosis  MSK: no deformities,   NEURO: no tremors, DTR normal  PSYCH: Awake and coherent      Results Review:     I reviewed the patient's new clinical results.    Lab Results   Component Value Date    GLUCOSE 201 (H) 09/18/2023    BUN 21 (H) 09/18/2023    CREATININE 0.67 09/18/2023    BCR 31.3 (H) 09/18/2023    K 3.7 09/18/2023    CO2 24.0 09/18/2023    CALCIUM 9.0 09/18/2023    ALBUMIN 4.4 09/17/2023    LABIL2 1.5 12/06/2018    AST 11 09/17/2023    ALT 11 09/17/2023       Lab Results   Component Value Date    HGBA1C 10.10 (H) 09/17/2023    HGBA1C 6.2 (H) 12/06/2018     Lab Results   Component Value Date    CREATININE 0.67 09/18/2023     Results from last 7 days   Lab Units 09/18/23  1107 09/18/23  0744   GLUCOSE mg/dL 275* 315*       Medication Review:  Reviewed.       Current Facility-Administered Medications:     acetaminophen (TYLENOL) tablet 650 mg, 650 mg, Oral, Q4H PRN, Meghan Lechuga APRN    sennosides-docusate (PERICOLACE) 8.6-50 MG per tablet 2 tablet, 2 tablet, Oral, BID, 2 tablet at 09/18/23 0809 **AND** polyethylene glycol (MIRALAX) packet 17 g, 17 g, Oral, Daily PRN **AND** bisacodyl (DULCOLAX) EC tablet 5 mg, 5 mg, Oral, Daily PRN **AND** bisacodyl (DULCOLAX) suppository 10 mg, 10 mg, Rectal, Daily PRN, Meghan Lechuga APRN    Calcium Replacement - Follow Nurse / BPA Driven Protocol, , Does not apply, PRN, Meghan Lechuga APRN    cefTRIAXone (ROCEPHIN) 1,000 mg in sodium chloride 0.9 % 100 mL IVPB, 1,000 mg, Intravenous, Q24H, Meghan Lechuga APRN, Last Rate: 200 mL/hr at 09/18/23 0104, 1,000 mg at 09/18/23 0104    cyclobenzaprine (FLEXERIL) tablet 10 mg, 10 mg, Oral, Nightly, Micaela Isabel MD    dextrose (D50W) (25 g/50 mL) IV injection 25 g, 25 g, Intravenous, Q15 Min PRN, Micaela Isabel MD    dextrose (GLUTOSE) oral gel 15 g, 15 g, Oral, Q15 Min PRN, Micaela Isabel MD    diphenhydrAMINE (BENADRYL) injection 25 mg, 25 mg, Intravenous, Q6H PRN, Meghan Lechuga APRN    gabapentin (NEURONTIN) capsule 100 mg, 100 mg, Oral, TID, Micaela Isabel MD, 100 mg at 09/18/23 1201    glucagon (GLUCAGEN) injection 1 mg, 1 mg, Intramuscular, Q15 Min PRN, Micaela Isabel MD    insulin lispro (HUMALOG/ADMELOG) injection 3-14 Units, 3-14 Units, Subcutaneous, TID With Meals, Micaela Isabel MD, 8 Units at 09/18/23 1201    linagliptin (TRADJENTA) tablet 5 mg, 5 mg, Oral, Daily, Micaela Isabel MD, 5 mg at 09/18/23 1201    Magnesium Standard Dose Replacement - Follow Nurse / BPA Driven Protocol, , Does not apply, PRN, Meghan Lechuga, APRN    metFORMIN (GLUCOPHAGE) tablet 1,000 mg, 1,000 mg, Oral, BID With Meals, Micaela Isabel MD, 1,000 mg at 09/18/23 1201    naproxen (NAPROSYN) tablet 250 mg, 250 mg, Oral, BID With Meals, Micaela Isabel MD    nitroglycerin (NITROSTAT) SL tablet 0.4  mg, 0.4 mg, Sublingual, Q5 Min PRN, Meghan Lechuga APRN    ondansetron (ZOFRAN) injection 4 mg, 4 mg, Intravenous, Q6H PRN, Meghan Lechuga APRN    Pharmacy Message, 1 each, Does not apply, Once, Micaela Isabel MD    Pharmacy Message, 1 each, Does not apply, Once, Micaela Isabel MD    Pharmacy Message, 1 each, Does not apply, Once, Micaela Isabel MD    Phosphorus Replacement - Follow Nurse / BPA Driven Protocol, , Does not apply, PRN, Meghan Lechuga APRN    Potassium Replacement - Follow Nurse / BPA Driven Protocol, , Does not apply, PRN, Meghan Lechuga APRN    rosuvastatin (CRESTOR) tablet 20 mg, 20 mg, Oral, Nightly, Micaela Isabel MD    sodium chloride 0.9 % flush 10 mL, 10 mL, Intravenous, Q12H, Meghan Lechuga APRN, 10 mL at 09/18/23 0830    sodium chloride 0.9 % flush 10 mL, 10 mL, Intravenous, PRN, Meghan Lechuga APRN    sodium chloride 0.9 % infusion 40 mL, 40 mL, Intravenous, PRN, Meghan Lechuga APRN          Assessment and plan:  Diabetes mellitus type 2 with hyperglycemia: Uncontrolled with high blood sugars, will change Lantus to 25 units subcu daily and add Humalog 8 units with each meal.  I will DC Tradjenta.  We will continue metformin for now.  We will follow blood sugars and make adjustments.  We will continue Humalog sliding scale.    UTI: Currently on Rocephin.    Thank you very much for the consultation.      Alyssa Razo MD FACE.

## 2023-09-18 NOTE — PLAN OF CARE
Problem: Adult Inpatient Plan of Care  Goal: Plan of Care Review  Outcome: Ongoing, Progressing  Goal: Patient-Specific Goal (Individualized)  Outcome: Ongoing, Progressing  Goal: Absence of Hospital-Acquired Illness or Injury  Outcome: Ongoing, Progressing  Intervention: Identify and Manage Fall Risk  Recent Flowsheet Documentation  Taken 9/18/2023 0140 by Myra Spears, RN  Safety Promotion/Fall Prevention:   clutter free environment maintained   room organization consistent   safety round/check completed  Intervention: Prevent Infection  Recent Flowsheet Documentation  Taken 9/18/2023 0140 by Myra Spears, RN  Infection Prevention:   environmental surveillance performed   single patient room provided   rest/sleep promoted  Goal: Optimal Comfort and Wellbeing  Outcome: Ongoing, Progressing  Intervention: Provide Person-Centered Care  Recent Flowsheet Documentation  Taken 9/18/2023 0140 by Myra Spears, RN  Trust Relationship/Rapport:   care explained   thoughts/feelings acknowledged   reassurance provided  Goal: Readiness for Transition of Care  Outcome: Ongoing, Progressing  Intervention: Mutually Develop Transition Plan  Recent Flowsheet Documentation  Taken 9/18/2023 0140 by Myra Spears, RN  Transportation Anticipated: car, drives self  Patient/Family Anticipated Services at Transition: none  Patient/Family Anticipates Transition to: home  Taken 9/18/2023 0135 by Myra Spears, RN  Equipment Currently Used at Home: glucometer   Goal Outcome Evaluation:      Patient admitted to floor for uncontrolled DM and facial swelling. Aox4 on RA, no complaints of pain. No facial swelling noted at this time. Endocrinology consult called. IV antibiotics continue.

## 2023-09-18 NOTE — H&P
Patient Care Team:  Akash Weiner MD as PCP - General (Family Medicine)    Chief complaint facial swelling    Subjective   History obtained from ER note and patient   Patient is a 43 y.o. female who presents with complaints of facial swelling for the last 24 hours. She states it feels hot and irritated. She reports no urticaria. There is no mucosal membrane involvement. She states that in the last few days she has had subjective fever and chills. She has been treated intermittently for recurrent urinary tract infections over the last 3 months. She states that prior to that her A1c's had been well controlled but recently has been elevated and the patient states she frequently is running blood sugars in the 300 range  She states her primary care providers told her this is due to repetitive infection. Apparently there has been questions about the patient having chronic fatigue syndrome secondary to recurrent mono infections. The patient's  states that she thinks that she needs a infectious disease consult. The patient took prednisone 20 mg today orally at about noon. She states that she has been taking Benadryl and Pepcid with some improvement in symptomatology. She reports that she has had some recent weight loss as well as polydipsia and polyuria   In the ER patient was started on Rocephin for urinary tract infection, given solumedrol, consulted endocrine. She has a pending Nica-Barr virus IgG and IgM, rheumatic factor, and MILADIS.    Onset of symptoms was ongoing    Review of Systems   Constitutional:  Positive for fatigue, fever and unexpected weight change.   HENT:  Positive for facial swelling.    Eyes: Negative.    Respiratory: Negative.     Cardiovascular: Negative.    Gastrointestinal:  Positive for nausea.   Endocrine: Positive for polydipsia and polyuria.   Genitourinary: Negative.    Musculoskeletal: Negative.    Skin: Negative.    Neurological: Negative.    Psychiatric/Behavioral: Negative.           History  Past Medical History:   Diagnosis Date    DDD (degenerative disc disease), cervical     Diabetes mellitus     PCOS (polycystic ovarian syndrome)      Past Surgical History:   Procedure Laterality Date    CERVICAL SPINE SURGERY       Family History   Problem Relation Age of Onset    No Known Problems Mother     No Known Problems Father     No Known Problems Sister     No Known Problems Brother     No Known Problems Maternal Aunt     No Known Problems Maternal Uncle     No Known Problems Paternal Aunt     No Known Problems Paternal Uncle     No Known Problems Maternal Grandmother     No Known Problems Maternal Grandfather     No Known Problems Paternal Grandmother     No Known Problems Paternal Grandfather     No Known Problems Other     Anemia Neg Hx     Arrhythmia Neg Hx     Asthma Neg Hx     Clotting disorder Neg Hx     Fainting Neg Hx     Heart attack Neg Hx     Heart disease Neg Hx     Heart failure Neg Hx     Hyperlipidemia Neg Hx     Hypertension Neg Hx      Social History     Tobacco Use    Smoking status: Former     Types: Electronic Cigarette    Smokeless tobacco: Never   Substance Use Topics    Alcohol use: Yes    Drug use: Never     (Not in a hospital admission)    Allergies:  Adhesive tape    Objective     Vital Signs  Temp:  [98.9 °F (37.2 °C)] 98.9 °F (37.2 °C)  Heart Rate:  [] 86  Resp:  [16] 16  BP: (129-172)/(74-88) 137/86     Physical Exam:      General Appearance:    Alert, cooperative, in no acute distress   Head:    Normocephalic, there is some angioedema in the infraorbital area and malar area. No mucosal membrane involvement.    Eyes:            Lids and lashes normal, conjunctivae and sclerae normal, no   icterus, no pallor, corneas clear, PERRLA   Ears:    Ears appear intact with no abnormalities noted   Throat:   No oral lesions, no thrush, oral mucosa moist   Neck:   No adenopathy, supple, trachea midline, no thyromegaly, no   carotid bruit, no JVD   Lungs:     Clear  to auscultation,respirations regular, even and                  unlabored    Heart:    Regular rhythm and normal rate, normal S1 and S2, no            murmur, no gallop, no rub, no click   Chest Wall:    No abnormalities observed   Abdomen:     Normal bowel sounds, no masses, no organomegaly, soft        non-tender, non-distended, no guarding, no rebound                tenderness   Extremities:   Moves all extremities well, no edema, no cyanosis, no             redness   Pulses:   Pulses palpable and equal bilaterally   Skin:   No bleeding, bruising or rash   Lymph nodes:   No palpable adenopathy   Neurologic:   No focal deficits noted       Results Review:     Imaging Results (Last 24 Hours)       ** No results found for the last 24 hours. **             Lab Results (last 24 hours)       Procedure Component Value Units Date/Time    Comprehensive Metabolic Panel [[id number]]  (Abnormal) Collected: 09/17/23 2140    Specimen: Blood Updated: 09/17/23 2207     Glucose 366 mg/dL      BUN 19 mg/dL      Creatinine 0.76 mg/dL      Sodium 138 mmol/L      Potassium 3.4 mmol/L      Chloride 101 mmol/L      CO2 23.0 mmol/L      Calcium 9.7 mg/dL      Total Protein 7.2 g/dL      Albumin 4.4 g/dL      ALT (SGPT) 11 U/L      AST (SGOT) 11 U/L      Alkaline Phosphatase 66 U/L      Total Bilirubin 0.2 mg/dL      Globulin 2.8 gm/dL      A/G Ratio 1.6 g/dL      BUN/Creatinine Ratio 25.0     Anion Gap 14.0 mmol/L      eGFR 98.6 mL/min/1.73     Narrative:      GFR Normal >60  Chronic Kidney Disease <60  Kidney Failure <15      Hemoglobin A1c [[id number]]  (Abnormal) Collected: 09/17/23 2140    Specimen: Blood Updated: 09/17/23 2202     Hemoglobin A1C 10.10 %     Urinalysis, Microscopic Only - Urine, Clean Catch [[id number]]  (Abnormal) Collected: 09/17/23 2140    Specimen: Urine, Clean Catch Updated: 09/17/23 2154     RBC, UA 6-12 /HPF      WBC, UA 21-30 /HPF      Bacteria, UA None Seen /HPF      Squamous Epithelial Cells, UA 0-2 /HPF       Hyaline Casts, UA None Seen /LPF      Methodology Automated Microscopy    Urine Culture - Urine, Urine, Clean Catch [[id number]] Collected: 09/17/23 2140    Specimen: Urine, Clean Catch Updated: 09/17/23 2153    Urinalysis With Culture If Indicated - Urine, Clean Catch [[id number]]  (Abnormal) Collected: 09/17/23 2140    Specimen: Urine, Clean Catch Updated: 09/17/23 2148     Color, UA Yellow     Appearance, UA Clear     pH, UA 7.5     Specific Gravity, UA 1.030     Glucose, UA >=1000 mg/dL (3+)     Ketones, UA Trace     Bilirubin, UA Negative     Blood, UA Trace     Protein, UA Trace     Leuk Esterase, UA Trace     Nitrite, UA Negative     Urobilinogen, UA 1.0 E.U./dL    Narrative:      In absence of clinical symptoms, the presence of pyuria, bacteria, and/or nitrites on the urinalysis result does not correlate with infection.    CBC & Differential [[id number]]  (Abnormal) Collected: 09/17/23 2140    Specimen: Blood Updated: 09/17/23 2148    Narrative:      The following orders were created for panel order CBC & Differential.  Procedure                               Abnormality         Status                     ---------                               -----------         ------                     CBC Auto Differential[[id number]]        Abnormal            Final result                 Please view results for these tests on the individual orders.    CBC Auto Differential [[id number]]  (Abnormal) Collected: 09/17/23 2140    Specimen: Blood Updated: 09/17/23 2148     WBC 11.50 10*3/mm3      RBC 4.42 10*6/mm3      Hemoglobin 11.8 g/dL      Hematocrit 36.2 %      MCV 81.9 fL      MCH 26.7 pg      MCHC 32.5 g/dL      RDW 15.5 %      RDW-SD 47.7 fl      MPV 7.8 fL      Platelets 553 10*3/mm3      Neutrophil % 88.2 %      Lymphocyte % 7.1 %      Monocyte % 3.8 %      Eosinophil % 0.1 %      Basophil % 0.8 %      Neutrophils, Absolute 10.20 10*3/mm3      Lymphocytes, Absolute 0.80 10*3/mm3      Monocytes, Absolute 0.40 10*3/mm3       Eosinophils, Absolute 0.00 10*3/mm3      Basophils, Absolute 0.10 10*3/mm3      nRBC 0.0 /100 WBC     EBVCA(IgG / M) [[id number]] Collected: 09/17/23 2140    Specimen: Blood Updated: 09/17/23 2145             I reviewed the patient's new clinical results.    Assessment & Plan     Type 1 Diabetes mellitus with hyperglycemia  -glucose 366  -given dose of humalog in ER  -A1C 10.10  -consulted endocrinology     Acute urinary tract infection  -started on Rocephin      Contact dermatitis  -facial swelling    Hypokalemia  -K 3.4, replacement per protocol      DVT prophylaxis- SCD's  GI prophylaxis- protonix    I discussed the patient's findings and my recommendations with patient.     Meghan Lechuga, APRN  09/17/23  23:20 EDT

## 2023-09-18 NOTE — CONSULTS
LOS: 0 days   Patient Care Team:  Akash Weiner MD as PCP - General (Family Medicine)        Subjective       Attending MD : Micaela Isabel MD    Patient Complaints: facial rash         History of Present Illness  :43 y.o. female who presents with complaints of facial swelling for the last 24 hours. She states it feels hot and irritated. She reports no urticaria. There is no mucosal membrane involvement. She states that in the last few days she has had subjective fever and chills. She has been treated intermittently for recurrent urinary tract infections over the last 3 months. She states that prior to that her A1c's had been well controlled but recently has been elevated and the patient states she frequently is running blood sugars in the 300 range  She states her primary care providers told her this is due to repetitive infection. Apparently there has been questions about the patient having chronic fatigue syndrome secondary to recurrent mono infections. The patient's  states that she thinks that she needs a infectious disease consult. The patient took prednisone 20 mg today orally at about noon. She states that she has been taking Benadryl and Pepcid with some improvement in symptomatology. She reports that she has had some recent weight loss as well as polydipsia and polyuria   In the ER patient was started on Rocephin for urinary tract infection, given solumedrol, consulted endocrine. She has a pending Nica-Barr virus IgG and IgM, rheumatic factor, and MILADIS.     Patient Denies:  NV    PMH :   Diabetes mellitus with hyperglycemia    Contact dermatitis    Hypokalemia    Acute UTI (urinary tract infection)      Review of Systems:    Pain    Objective     Vital Signs  Temp:  [97.8 °F (36.6 °C)-98.9 °F (37.2 °C)] 97.8 °F (36.6 °C)  Heart Rate:  [] 80  Resp:  [14-18] 15  BP: (104-172)/(72-89) 125/89    Physical Exam:     General Appearance:    Alert, cooperative, in no acute distress   Head:     Normocephalic, without obvious abnormality, atraumatic   Eyes:            Lids and lashes normal, conjunctivae and sclerae normal, no   icterus, no pallor, corneas clear, PERRLA   Ears:    Ears appear intact with no abnormalities noted   Throat:   No oral lesions, no thrush, oral mucosa moist   Neck:   No adenopathy, supple, trachea midline, no thyromegaly, no     carotid bruit, no JVD   Back:     No kyphosis present, no scoliosis present, no skin lesions,       erythema or scars, no tenderness to percussion or                   palpation,   range of motion normal   Lungs:     Clear to auscultation,respirations regular, even and                   unlabored    Heart:    Regular rhythm and normal rate, normal S1 and S2, no            murmur, no gallop, no rub, no click   Breast Exam:    Deferred   Abdomen:     Normal bowel sounds, no masses, no organomegaly, soft        non-tender, non-distended, no guarding, no rebound                 tenderness   Genitalia:    Deferred   Extremities:   Moves all extremities well, no edema, no cyanosis, no              redness   Pulses:   Pulses palpable and equal bilaterally   Skin:   No bleeding, bruising or rash   Lymph nodes:   No palpable adenopathy   Neurologic:   Cranial nerves 2 - 12 grossly intact, sensation intact, DTR        present and equal bilaterally          Results Review:    Lab Results (last 72 hours)       Procedure Component Value Units Date/Time    MILADIS [[id number]]  (Normal) Collected: 09/17/23 2352    Specimen: Blood Updated: 09/18/23 1110     Antinuclear Antibodies (MILADIS) Negative    POC Glucose Once [[id number]]  (Abnormal) Collected: 09/18/23 1107    Specimen: Blood Updated: 09/18/23 1108     Glucose 275 mg/dL      Comment: Serial Number: [id number]:  [id number]       Rheumatoid Factor [[id number]]  (Normal) Collected: 09/17/23 2352    Specimen: Blood Updated: 09/18/23 1040     Rheumatoid Factor Quantitative <10.0 IU/mL     POC Glucose Once [[id number]]   (Abnormal) Collected: 09/18/23 0744    Specimen: Blood Updated: 09/18/23 0746     Glucose 315 mg/dL      Comment: Serial Number: [id number]:  [id number]       Basic Metabolic Panel [[id number]]  (Abnormal) Collected: 09/18/23 0104    Specimen: Blood Updated: 09/18/23 0158     Glucose 201 mg/dL      BUN 21 mg/dL      Creatinine 0.67 mg/dL      Sodium 143 mmol/L      Potassium 3.7 mmol/L      Chloride 106 mmol/L      CO2 24.0 mmol/L      Calcium 9.0 mg/dL      BUN/Creatinine Ratio 31.3     Anion Gap 13.0 mmol/L      eGFR 110.0 mL/min/1.73     Narrative:      GFR Normal >60  Chronic Kidney Disease <60  Kidney Failure <15      CBC & Differential [[id number]]  (Abnormal) Collected: 09/18/23 0104    Specimen: Blood Updated: 09/18/23 0139    Narrative:      The following orders were created for panel order CBC & Differential.  Procedure                               Abnormality         Status                     ---------                               -----------         ------                     CBC Auto Differential[[id number]]        Abnormal            Final result                 Please view results for these tests on the individual orders.    CBC Auto Differential [[id number]]  (Abnormal) Collected: 09/18/23 0104    Specimen: Blood Updated: 09/18/23 0139     WBC 13.00 10*3/mm3      RBC 4.16 10*6/mm3      Hemoglobin 11.0 g/dL      Hematocrit 34.1 %      MCV 82.1 fL      MCH 26.5 pg      MCHC 32.3 g/dL      RDW 14.8 %      RDW-SD 45.1 fl      MPV 8.1 fL      Platelets 545 10*3/mm3      Neutrophil % 84.4 %      Lymphocyte % 11.9 %      Monocyte % 2.6 %      Eosinophil % 0.2 %      Basophil % 0.9 %      Neutrophils, Absolute 10.90 10*3/mm3      Lymphocytes, Absolute 1.50 10*3/mm3      Monocytes, Absolute 0.30 10*3/mm3      Eosinophils, Absolute 0.00 10*3/mm3      Basophils, Absolute 0.10 10*3/mm3      nRBC 0.1 /100 WBC     Comprehensive Metabolic Panel [[id number]]  (Abnormal) Collected: 09/17/23 2140    Specimen:  Blood Updated: 09/17/23 2207     Glucose 366 mg/dL      BUN 19 mg/dL      Creatinine 0.76 mg/dL      Sodium 138 mmol/L      Potassium 3.4 mmol/L      Chloride 101 mmol/L      CO2 23.0 mmol/L      Calcium 9.7 mg/dL      Total Protein 7.2 g/dL      Albumin 4.4 g/dL      ALT (SGPT) 11 U/L      AST (SGOT) 11 U/L      Alkaline Phosphatase 66 U/L      Total Bilirubin 0.2 mg/dL      Globulin 2.8 gm/dL      A/G Ratio 1.6 g/dL      BUN/Creatinine Ratio 25.0     Anion Gap 14.0 mmol/L      eGFR 98.6 mL/min/1.73     Narrative:      GFR Normal >60  Chronic Kidney Disease <60  Kidney Failure <15      Hemoglobin A1c [[id number]]  (Abnormal) Collected: 09/17/23 2140    Specimen: Blood Updated: 09/17/23 2202     Hemoglobin A1C 10.10 %     Urinalysis, Microscopic Only - Urine, Clean Catch [[id number]]  (Abnormal) Collected: 09/17/23 2140    Specimen: Urine, Clean Catch Updated: 09/17/23 2154     RBC, UA 6-12 /HPF      WBC, UA 21-30 /HPF      Bacteria, UA None Seen /HPF      Squamous Epithelial Cells, UA 0-2 /HPF      Hyaline Casts, UA None Seen /LPF      Methodology Automated Microscopy    Urine Culture - Urine, Urine, Clean Catch [[id number]] Collected: 09/17/23 2140    Specimen: Urine, Clean Catch Updated: 09/17/23 2153    Urinalysis With Culture If Indicated - Urine, Clean Catch [[id number]]  (Abnormal) Collected: 09/17/23 2140    Specimen: Urine, Clean Catch Updated: 09/17/23 2148     Color, UA Yellow     Appearance, UA Clear     pH, UA 7.5     Specific Gravity, UA 1.030     Glucose, UA >=1000 mg/dL (3+)     Ketones, UA Trace     Bilirubin, UA Negative     Blood, UA Trace     Protein, UA Trace     Leuk Esterase, UA Trace     Nitrite, UA Negative     Urobilinogen, UA 1.0 E.U./dL    Narrative:      In absence of clinical symptoms, the presence of pyuria, bacteria, and/or nitrites on the urinalysis result does not correlate with infection.    CBC & Differential [[id number]]  (Abnormal) Collected: 09/17/23 2140    Specimen: Blood Updated:  09/17/23 2148    Narrative:      The following orders were created for panel order CBC & Differential.  Procedure                               Abnormality         Status                     ---------                               -----------         ------                     CBC Auto Differential[[id number]]        Abnormal            Final result                 Please view results for these tests on the individual orders.    CBC Auto Differential [[id number]]  (Abnormal) Collected: 09/17/23 2140    Specimen: Blood Updated: 09/17/23 2148     WBC 11.50 10*3/mm3      RBC 4.42 10*6/mm3      Hemoglobin 11.8 g/dL      Hematocrit 36.2 %      MCV 81.9 fL      MCH 26.7 pg      MCHC 32.5 g/dL      RDW 15.5 %      RDW-SD 47.7 fl      MPV 7.8 fL      Platelets 553 10*3/mm3      Neutrophil % 88.2 %      Lymphocyte % 7.1 %      Monocyte % 3.8 %      Eosinophil % 0.1 %      Basophil % 0.8 %      Neutrophils, Absolute 10.20 10*3/mm3      Lymphocytes, Absolute 0.80 10*3/mm3      Monocytes, Absolute 0.40 10*3/mm3      Eosinophils, Absolute 0.00 10*3/mm3      Basophils, Absolute 0.10 10*3/mm3      nRBC 0.0 /100 WBC     EBVCA(IgG / M) [[id number]] Collected: 09/17/23 2140    Specimen: Blood Updated: 09/17/23 2145                Imaging Results (Last 72 Hours)       Procedure Component Value Units Date/Time    CT Abdomen Pelvis Stone Protocol [[id number]] Collected: 09/18/23 1018     Updated: 09/18/23 1027    Narrative:      CT ABDOMEN PELVIS STONE PROTOCOL    Date of Exam: 9/18/2023 10:06 AM EDT    Indication: Recurrent urinary tract infection..    Comparison: None available.    Technique: Axial CT images were obtained of the abdomen and pelvis without the administration of contrast. Sagittal and coronal reconstructions were performed.  Automated exposure control and iterative reconstruction methods were used.      Findings:  There is mild to moderate asymmetric right hemidiaphragm elevation. Lung bases are clear. Heart size is within  normal limits.    No focal liver lesions identified. The liver does not appear grossly cirrhotic or steatotic.    Multiple gallstones are present. No evidence of cholecystitis or choledocholithiasis or abnormal biliary dilation.    Benign calcified granulomatous changes are seen within the spleen. Pancreas, adrenals, are normal. Nonobstructing stone in the left mid kidney measures 4 mm. A stone in the left lower renal pole measures 2 mm.    A right ureteropelvic junction stone measures 7 x 5 mm, with mild right hydronephrosis. Urinary bladder, uterus and rectum are normal. Hysterectomy changes are present. Right ovarian cyst or cystic lesion measures 4.0 x 3.5 cm. No pelvic free fluid is   identified.    Bulky posterior disc osteophytes are present at L2-3, L4-5 and L5-S1. Suspected severe canal stenosis at the L1-2 level..         Impression:      Impression:    1. 7 mm obstructing right ureteropelvic junction stone with mild right hydronephrosis.  2. Nonobstructing left renal stones.  3. 4 mm right ovarian cyst or cystic lesion. This is slightly larger than typically seen for physiologic cyst. Consider pelvic ultrasound follow-up in 6 weeks or at a different phase of the menstrual cycle to ensure improvement or resolution.      Electronically Signed: Melodie Jones MD    9/18/2023 10:25 AM EDT    Workstation ID: [id number]              Medication Review:      Hospital Medications (active)         Dose Frequency Start End    acetaminophen (TYLENOL) tablet 650 mg 650 mg Every 4 Hours PRN 9/18/2023     Admin Instructions: If given for fever, use fever parameter: fever greater than 100.4 °F  Based on patient request - if ordered for moderate or severe pain, provider allows for administration of a medication prescribed for a lower pain scale.    Do not exceed 4 grams of acetaminophen in a 24 hr period. Max dose of 2gm for AST/ALT greater than 120 units/L.    If given for pain, use the following pain scale:   Mild Pain =  "Pain Score of 1-3, CPOT 1-2  Moderate Pain = Pain Score of 4-6, CPOT 3-4  Severe Pain = Pain Score of 7-10, CPOT 5-8    Route: Oral    bisacodyl (DULCOLAX) EC tablet 5 mg 5 mg Daily PRN 9/18/2023     Admin Instructions: Use if no bowel movement after 12 hours.  Swallow whole. Do not crush, split, or chew tablet.    Route: Oral    Linked Group 1: See Hyperspace for full Linked Orders Report.        bisacodyl (DULCOLAX) suppository 10 mg 10 mg Daily PRN 9/18/2023     Admin Instructions: Use if no bowel movement after 12 hours.  Hold for diarrhea    Route: Rectal    Linked Group 1: See Hyperspace for full Linked Orders Report.        Calcium Replacement - Follow Nurse / BPA Driven Protocol  As Needed 9/18/2023     Admin Instructions: Open Order & Select \"[facility] Electrolyte Replacement Protocol Algorithm\" to View Details    Route: Does not apply    cefTRIAXone (ROCEPHIN) 1,000 mg in sodium chloride 0.9 % 100 mL IVPB 1,000 mg Every 24 Hours 9/18/2023 9/23/2023    Admin Instructions: LR should be paused and flushing of the line with NS is recommended prior to and after completion of ceftriaxone infusion due to incompatibility. Do not co-adminster with calcium-containing solutions.  Caution: Look alike/sound alike drug alert    Route: Intravenous    cyclobenzaprine (FLEXERIL) tablet 10 mg 10 mg Nightly 9/18/2023     Route: Oral    dextrose (D50W) (25 g/50 mL) IV injection 25 g 25 g Every 15 Minutes PRN 9/18/2023     Admin Instructions: Blood sugar less than 70; patient has IV access - Unresponsive, NPO or Unable To Safely Swallow    Route: Intravenous    dextrose (GLUTOSE) oral gel 15 g 15 g Every 15 Minutes PRN 9/18/2023     Admin Instructions: BS<70, Patient Alert, Is not NPO, Can safely swallow.    Route: Oral    diphenhydrAMINE (BENADRYL) injection 25 mg 25 mg Every 6 Hours PRN 9/18/2023     Admin Instructions: 25 mg may be given IV push over less than 1 minute.  Caution: Look alike/sound alike drug alert. This med may be " "ordered in other forms and routes. Before giving verify the last time the drug was given by any route/form.      Route: Intravenous    gabapentin (NEURONTIN) capsule 100 mg 100 mg 3 Times Daily 9/18/2023     Admin Instructions:     Route: Oral    glucagon (GLUCAGEN) injection 1 mg 1 mg Every 15 Minutes PRN 9/18/2023     Admin Instructions: Blood Glucose Less Than 70 - Patient Without IV Access - Unresponsive, NPO or Unable To Safely Swallow  Reconstitute powder for injection by adding 1 mL of -supplied sterile diluent or sterile water for injection to a vial containing 1 mg of the drug, to provide solutions containing 1 mg/mL. Shake vial gently to dissolve.    Route: Intramuscular    insulin lispro (HUMALOG/ADMELOG) injection 3-14 Units 3-14 Units 3 Times Daily With Meals 9/18/2023     Admin Instructions: Correction Insulin - Moderate-High Dose (Total Insulin Dose 60-80 units/day, Patient Taking Insulin at Home)    Blood Glucose 150-199 mg/dL - 3 units  Blood Glucose 200-249 mg/dL - 5 units  Blood Glucose 250-299 mg/dL - 8 units  Blood Glucose 300-349 mg/dL - 10 units  Blood Glucose 350-400 mg/dL - 12 units  Blood Glucose Greater Than 400 mg/dL - 14 units & Call Provider   Caution: Look alike/sound alike drug alert    Route: Subcutaneous    linagliptin (TRADJENTA) tablet 5 mg 5 mg Daily 9/18/2023     Route: Oral    Magnesium Standard Dose Replacement - Follow Nurse / BPA Driven Protocol  As Needed 9/18/2023     Admin Instructions: Open Order & Select \"BHS Electrolyte Replacement Protocol Algorithm\" to View Details    Route: Does not apply    metFORMIN (GLUCOPHAGE) tablet 1,000 mg 1,000 mg 2 Times Daily With Meals 9/18/2023     Admin Instructions: Caution: Look alike/sound alike drug alert    Route: Oral    naproxen (NAPROSYN) tablet 250 mg 250 mg 2 Times Daily With Meals 9/18/2023     Admin Instructions: Take with food. Based on patient request - if ordered for moderate or severe pain, provider allows " for administration of a medication prescribed for a lower pain scale.  If given for pain, use the following pain scale:  Mild Pain = Pain Score of 1-3, CPOT 1-2  Moderate Pain = Pain Score of 4-6, CPOT 3-4  Severe Pain = Pain Score of 7-10, CPOT 5-8    Route: Oral    nitroglycerin (NITROSTAT) SL tablet 0.4 mg 0.4 mg Every 5 Minutes PRN 9/18/2023     Admin Instructions: If Pain Unrelieved After 3 Doses Notify MD  May administer up to 3 doses per episode.    Route: Sublingual    ondansetron (ZOFRAN) injection 4 mg 4 mg Every 6 Hours PRN 9/18/2023     Admin Instructions: If BOTH ondansetron (ZOFRAN) and promethazine (PHENERGAN) are ordered use ondansetron first and THEN promethazine IF ondansetron is ineffective.    Route: Intravenous    Pharmacy Message 1 each Once 9/18/2023     Admin Instructions: Message to Pharmacy  Medication: methylphenidate ER   Dose: 27 mg  Route: po  Frequency: daily  Instructions: may use home suppley    This order should only be used as a last resort for a complicated order.    Notes to Pharmacy: Provider Contact Number: [phone]    Route: Does not apply    Pharmacy Message 1 each Once 9/18/2023     Admin Instructions: Message to Pharmacy  Medication: Trekendi XR  Dose: 100 mg  Route: po  Frequency: q am  Instructions: May use home supply    This order should only be used as a last resort for a complicated order.    Notes to Pharmacy: Provider Contact Number: [phone]    Route: Does not apply    Pharmacy Message 1 each Once 9/18/2023     Admin Instructions: Message to Pharmacy  Medication: Relafen  Dose: 750 mg  Route: po  Frequency: hs  Instructions: ok to take with naproxen (previously tolerated); use home supply    This order should only be used as a last resort for a complicated order.    Notes to Pharmacy: Provider Contact Number: [phone]    Route: Does not apply    Phosphorus Replacement - Follow Nurse / BPA Driven Protocol  As Needed 9/18/2023     Admin Instructions: Open Order  "& Select \"BHS Electrolyte Replacement Protocol Algorithm\" to View Details    Route: Does not apply    polyethylene glycol (MIRALAX) packet 17 g 17 g Daily PRN 9/18/2023     Admin Instructions: Use if no bowel movement after 12 hours. Mix in 6-8 ounces of water.  Use 4-8 ounces of water, tea, or juice for each 17 gram dose.    Route: Oral    Linked Group 1: See Hyperspace for full Linked Orders Report.        Potassium Replacement - Follow Nurse / BPA Driven Protocol  As Needed 9/18/2023     Admin Instructions: Open Order & Select \"BHS Electrolyte Replacement Protocol Algorithm\" to View Details    Route: Does not apply    rosuvastatin (CRESTOR) tablet 20 mg 20 mg Nightly 9/18/2023     Admin Instructions: Avoid grapefruit juice.    Route: Oral    sennosides-docusate (PERICOLACE) 8.6-50 MG per tablet 2 tablet 2 tablet 2 Times Daily 9/18/2023     Admin Instructions: HOLD MEDICATION IF PATIENT HAS HAD BOWEL MOVEMENT. Start bowel management regimen if patient has not had a bowel movement after 12 hours.    Route: Oral    Linked Group 1: See Hyperspace for full Linked Orders Report.        sodium chloride 0.9 % flush 10 mL 10 mL Every 12 Hours Scheduled 9/18/2023     Route: Intravenous    sodium chloride 0.9 % flush 10 mL 10 mL As Needed 9/18/2023     Route: Intravenous    sodium chloride 0.9 % infusion 40 mL 40 mL As Needed 9/18/2023     Admin Instructions: Following administration of an IV intermittent medication, flush line with 40mL NS at 100mL/hr.    Route: Intravenous            Assessment & Plan         Diabetes mellitus with hyperglycemia    Contact dermatitis    Hypokalemia    Acute UTI (urinary tract infection)    Allergic reaction Keflex     Plan :    DC rocephin   IV levaquine  C&S  Will follow  Thank you    Ryan Patrick MD  09/18/23  12:32 EDT        "

## 2023-09-18 NOTE — CASE MANAGEMENT/SOCIAL WORK
Continued Stay Note  [facility]     Patient Name: Nora Tam  MRN: [id number]  Today's Date: 9/18/2023    Admit Date: 9/17/2023    Plan: Return home   Discharge Plan       Row Name 09/18/23 1504       Plan    Plan Return home    Plan Comments Barriers: IV antibiotics, pending Cultures. Followed by Endorcrinolgy and ID                        Phone communication or documentation only - no physical contact with patient or family.   Monse WEBB,RN Case Manager  [facility]  Phone: Desk- [phone] cell- [phone]

## 2023-09-18 NOTE — PLAN OF CARE
Problem: Adult Inpatient Plan of Care  Goal: Plan of Care Review  Outcome: Ongoing, Progressing  Flowsheets (Taken 9/18/2023 1821)  Plan of Care Reviewed With: patient  Goal: Absence of Hospital-Acquired Illness or Injury  Outcome: Ongoing, Progressing  Intervention: Identify and Manage Fall Risk  Flowsheets  Taken 9/18/2023 1400  Safety Promotion/Fall Prevention:   safety round/check completed   room organization consistent   nonskid shoes/slippers when out of bed   lighting adjusted   fall prevention program maintained   clutter free environment maintained   assistive device/personal items within reach  Taken 9/18/2023 1200  Safety Promotion/Fall Prevention:   safety round/check completed   room organization consistent   nonskid shoes/slippers when out of bed   lighting adjusted   fall prevention program maintained   clutter free environment maintained   assistive device/personal items within reach  Taken 9/18/2023 1000  Safety Promotion/Fall Prevention:   safety round/check completed   nonskid shoes/slippers when out of bed   room organization consistent   lighting adjusted   fall prevention program maintained   clutter free environment maintained  Taken 9/18/2023 0800  Safety Promotion/Fall Prevention:   safety round/check completed   room organization consistent   nonskid shoes/slippers when out of bed   lighting adjusted   fall prevention program maintained   clutter free environment maintained   assistive device/personal items within reach  Intervention: Prevent Skin Injury  Flowsheets  Taken 9/18/2023 1400  Body Position: position changed independently  Taken 9/18/2023 1200  Body Position: position changed independently  Taken 9/18/2023 1000  Body Position: position changed independently  Taken 9/18/2023 0800  Body Position: position changed independently  Intervention: Prevent and Manage VTE (Venous Thromboembolism) Risk  Flowsheets  Taken 9/18/2023 1400  Activity Management: up ad neida  Taken 9/18/2023  1200  Activity Management: up ad neida  Taken 9/18/2023 1000  Activity Management: up ad neida  Taken 9/18/2023 0800  Activity Management:   ambulated to bathroom   up ad neida  Range of Motion: active ROM (range of motion) encouraged  Intervention: Prevent Infection  Flowsheets  Taken 9/18/2023 1400  Infection Prevention:   equipment surfaces disinfected   hand hygiene promoted   rest/sleep promoted   single patient room provided  Taken 9/18/2023 1200  Infection Prevention:   single patient room provided   rest/sleep promoted   hand hygiene promoted   equipment surfaces disinfected  Taken 9/18/2023 1000  Infection Prevention:   single patient room provided   rest/sleep promoted   hand hygiene promoted   equipment surfaces disinfected  Taken 9/18/2023 0800  Infection Prevention:   single patient room provided   rest/sleep promoted   hand hygiene promoted   equipment surfaces disinfected  Goal: Optimal Comfort and Wellbeing  Outcome: Ongoing, Progressing  Intervention: Monitor Pain and Promote Comfort  Flowsheets (Taken 9/18/2023 1821)  Pain Management Interventions: see MAR  Intervention: Provide Person-Centered Care  Flowsheets (Taken 9/18/2023 0800)  Trust Relationship/Rapport: care explained   Goal Outcome Evaluation:  Plan of Care Reviewed With: patient

## 2023-09-18 NOTE — ED PROVIDER NOTES
Subjective   History of Present Illness  43-year-old female presents with a 24-hour history of increasing facial swelling.  She states it feels hot and irritated.  She reports no urticaria.  There is no mucosal membrane involvement.  She states that in the last few days she has had subjective fever and chills.  She has been treated intermittently for recurrent urinary tract infections over the last 3 months.  She states that prior to that her A1c's had been well controlled but recently has been elevated and the patient states she frequently is running blood sugars in the 300 range.  She states her primary care providers told her this is due to repetitive infection.  Apparently there has been questions about the patient having chronic fatigue syndrome secondary to recurrent mono infections.  The patient's  states that she thinks that she needs a infectious disease consult.  The patient took prednisone 20 mg today orally at about noon.  She states that she has been taking Benadryl and Pepcid with some improvement in symptomatology.  She reports that she has had some recent weight loss as well as polydipsia and polyuria  Patient does not report lymphadenopathy  Review of Systems   Constitutional:  Positive for fatigue, fever and unexpected weight change.   HENT:  Positive for facial swelling. Negative for trouble swallowing.    Eyes:  Negative for discharge.   Respiratory:  Negative for shortness of breath and wheezing.    Gastrointestinal:  Positive for nausea. Negative for diarrhea and vomiting.   Endocrine: Positive for polydipsia and polyuria. Negative for polyphagia.   Genitourinary:  Negative for flank pain.   Musculoskeletal:  Negative for back pain and neck stiffness.   Hematological:  Negative for adenopathy. Does not bruise/bleed easily.   All other systems reviewed and are negative.    Past Medical History:   Diagnosis Date    DDD (degenerative disc disease), cervical     Diabetes mellitus     PCOS  (polycystic ovarian syndrome)        Allergies   Allergen Reactions    Adhesive Tape Other (See Comments)       Past Surgical History:   Procedure Laterality Date    CERVICAL SPINE SURGERY         Family History   Problem Relation Age of Onset    No Known Problems Mother     No Known Problems Father     No Known Problems Sister     No Known Problems Brother     No Known Problems Maternal Aunt     No Known Problems Maternal Uncle     No Known Problems Paternal Aunt     No Known Problems Paternal Uncle     No Known Problems Maternal Grandmother     No Known Problems Maternal Grandfather     No Known Problems Paternal Grandmother     No Known Problems Paternal Grandfather     No Known Problems Other     Anemia Neg Hx     Arrhythmia Neg Hx     Asthma Neg Hx     Clotting disorder Neg Hx     Fainting Neg Hx     Heart attack Neg Hx     Heart disease Neg Hx     Heart failure Neg Hx     Hyperlipidemia Neg Hx     Hypertension Neg Hx        Social History     Socioeconomic History    Marital status: Single   Tobacco Use    Smoking status: Former     Types: Electronic Cigarette    Smokeless tobacco: Never   Substance and Sexual Activity    Alcohol use: Yes    Drug use: Never       No recent unusual food water travel or activity.  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 Conjunctivae are not injected. Normal tympanic membranes. Oropharynx and nares are normal.  The face is somewhat flushed and there does appear to be some angioedema in the infraorbital area as well as the malar area.  There is no mucosal membrane involvement   Neck: Supple. Midline trachea. No JVD. No goiter.   Chest: Clear and equal breath sounds bilaterally, regular rate and rhythm without murmur or rub.   Abdomen: Positive bowel sounds, suprapubic discomfort, there is no palpable hepato or splenomegaly and overall  the abdomen is nondistended. No rebound or peritoneal signs. No CVA tenderness.   Extremities no clubbing. cyanosis or edema. Motor sensory exam is normal. The full range of motion is intact   Skin: Warm and dry, no rashes or petechia.  There is erythema noted to the face there is no desquamation noted there is no urticaria noted.  There is no evidence of erythema multiforme  Lymphatic: No regional lymphadenopathy. No calf pain, swelling or Homans sign    Procedures           ED Course      Labs Reviewed   COMPREHENSIVE METABOLIC PANEL - Abnormal; Notable for the following components:       Result Value    Glucose 366 (*)     Potassium 3.4 (*)     All other components within normal limits    Narrative:     GFR Normal >60  Chronic Kidney Disease <60  Kidney Failure <15     URINALYSIS W/ CULTURE IF INDICATED - Abnormal; Notable for the following components:    Glucose, UA >=1000 mg/dL (3+) (*)     Ketones, UA Trace (*)     Blood, UA Trace (*)     Protein, UA Trace (*)     Leuk Esterase, UA Trace (*)     All other components within normal limits    Narrative:     In absence of clinical symptoms, the presence of pyuria, bacteria, and/or nitrites on the urinalysis result does not correlate with infection.   HEMOGLOBIN A1C - Abnormal; Notable for the following components:    Hemoglobin A1C 10.10 (*)     All other components within normal limits   CBC WITH AUTO DIFFERENTIAL - Abnormal; Notable for the following components:    WBC 11.50 (*)     Hemoglobin 11.8 (*)     RDW 15.5 (*)     Platelets 553 (*)     Neutrophil % 88.2 (*)     Lymphocyte % 7.1 (*)     Monocyte % 3.8 (*)     Eosinophil % 0.1 (*)     Neutrophils, Absolute 10.20 (*)     All other components within normal limits   URINALYSIS, MICROSCOPIC ONLY - Abnormal; Notable for the following components:    RBC, UA 6-12 (*)     WBC, UA 21-30 (*)     All other components within normal limits   URINE CULTURE   EBVCA(IGG/M)   RHEUMATOID FACTOR   MILADIS   CBC AND DIFFERENTIAL     Narrative:     The following orders were created for panel order CBC & Differential.  Procedure                               Abnormality         Status                     ---------                               -----------         ------                     CBC Auto Differential[[id number]]        Abnormal            Final result                 Please view results for these tests on the individual orders.     Medications   methylPREDNISolone sodium succinate (SOLU-Medrol) injection 60 mg (has no administration in time range)   potassium chloride (K-DUR,KLOR-CON) CR tablet 40 mEq (has no administration in time range)   insulin lispro (HUMALOG/ADMELOG) injection 12 Units (has no administration in time range)   diphenhydrAMINE (BENADRYL) injection 50 mg (50 mg Intravenous Given 9/17/23 2137)   famotidine (PEPCID) injection 20 mg (20 mg Intravenous Given 9/17/23 2137)     No radiology results for the last day                                       Medical Decision Making  Findings were discussed with the patient and her .  The patient will be started on IV ceftriaxone and we will obtain endocrine consultation.  The patient was given more effective steroid dose of Solu-Medrol tonight and we will follow glucoses closely.  The patient was given Humalog tonight.  The patient has pending Nica-Barr virus IgG and IgM, rheumatoid factor, MILADIS.  Sed rate and CRP were felt to be unreliable screens for any potential rheumatologic condition due to the active infection.  The  reported the patient has been taking cephalexin intermittently for 2 weeks.  Decision regarding infectious disease consultation will be deferred until culture available and to the primary care providers    Amount and/or Complexity of Data Reviewed  Independent Historian: spouse  Labs: ordered. Decision-making details documented in ED Course.  Discussion of management or test interpretation with external provider(s): Test discussed with  on-call [facility] provider    Risk  Prescription drug management.  Parenteral controlled substances.  Decision regarding hospitalization.        Final diagnoses:   Type 1 diabetes mellitus with hyperglycemia   Acute urinary tract infection   Contact dermatitis, unspecified contact dermatitis type, unspecified trigger       ED Disposition  ED Disposition       ED Disposition   Decision to Admit    Condition   --    Comment   Level of Care: Telemetry [5]   Diagnosis: Diabetes mellitus with hyperglycemia [1008233]   Admitting Physician: TERESA RICHARDSON [[id number]]   Attending Physician: TERESA RICHARDSON [[id number]]                 No follow-up provider specified.       Medication List      No changes were made to your prescriptions during this visit.            Alexis Lucio MD  09/17/23 [id number]

## 2023-09-18 NOTE — PROGRESS NOTES
LOS: 0 days   Patient Care Team:  Akash Weiner MD as PCP - General (Family Medicine)    Subjective     Interval History: Patient swelling has resolved    Patient Complaints: Overall feels better.  Blood sugars remain elevated.  She denies any urinary symptoms.    Patient provides additional history.  She has had nearly constant urinary tract infection since May.  Blood sugars have been elevated during that time and last A1c was 10, which is much higher than her normal.  She started both Lantus and cephalexin 2 days prior to onset of facial swelling.  There was no allergic reaction at her Lantus injection site.  She has been on lisinopril for many years.  Her mother had angioedema with lisinopril.  She has not had any upper respiratory illnesses, bronchitis or pneumonia.  She has had no skin infections.  She is concerned she may have an immunodeficiency given the recurrent infections.  She has had no recent travel.  She is an  and is exposed to multiple viruses.    History taken from: patient    Review of Systems   Constitutional:  Negative for activity change, appetite change, chills, diaphoresis, fatigue, fever and unexpected weight change.   HENT:  Negative for facial swelling.    Eyes:  Negative for visual disturbance.   Respiratory:  Negative for cough, shortness of breath, wheezing and stridor.    Cardiovascular:  Negative for chest pain, palpitations and leg swelling.   Gastrointestinal:  Positive for diarrhea. Negative for abdominal pain, constipation, nausea and vomiting.   Endocrine: Negative for polyuria.   Genitourinary:  Negative for dysuria, flank pain and hematuria.   Musculoskeletal:  Negative for arthralgias and back pain.   Skin:  Negative for rash and wound.   Neurological:  Negative for dizziness, tremors, weakness, light-headedness, numbness and headaches.   Psychiatric/Behavioral:  Negative for confusion.          Objective     Vital Signs  Temp:  [97.8 °F (36.6  °C)-98.9 °F (37.2 °C)] 97.8 °F (36.6 °C)  Heart Rate:  [] 72  Resp:  [14-18] 14  BP: (104-172)/(72-88) 104/76    Physical Exam:     General Appearance:    Alert, cooperative, in no acute distress,   Head:    Normocephalic, without obvious abnormality, atraumatic   Eyes:            Lids and lashes normal, conjunctivae and sclerae normal, no   icterus, no pallor, corneas clear, PERRLA   Ears:    Ears appear intact with no abnormalities noted   Throat:   No oral lesions, no thrush, oral mucosa moist   Neck:   No adenopathy, supple, trachea midline, no thyromegaly, no   carotid bruit, no JVD   Lungs:     Clear to auscultation,respirations regular, even and                  unlabored    Heart:    Regular rhythm and normal rate, normal S1 and S2, no            murmur, no gallop, no rub, no click   Chest Wall:    No abnormalities observed   Abdomen:     Normal bowel sounds, no masses, no organomegaly, soft        Non-tender non-distended, no guarding,   Extremities:   Moves all extremities well, no edema, no cyanosis, no             Redness   Pulses:   Pulses palpable and equal bilaterally   Skin:   No bleeding, bruising or rash   Lymph nodes:   No palpable adenopathy   Neurologic:   Cranial nerves 2 - 12 grossly intact, sensation intact, DTR       present and equal bilaterally        Results Review:    Lab Results (last 24 hours)       Procedure Component Value Units Date/Time    POC Glucose Once [[id number]]  (Abnormal) Collected: 09/18/23 0744    Specimen: Blood Updated: 09/18/23 0746     Glucose 315 mg/dL      Comment: Serial Number: [id number]:  [id number]       Basic Metabolic Panel [[id number]]  (Abnormal) Collected: 09/18/23 0104    Specimen: Blood Updated: 09/18/23 0158     Glucose 201 mg/dL      BUN 21 mg/dL      Creatinine 0.67 mg/dL      Sodium 143 mmol/L      Potassium 3.7 mmol/L      Chloride 106 mmol/L      CO2 24.0 mmol/L      Calcium 9.0 mg/dL      BUN/Creatinine Ratio 31.3     Anion Gap 13.0  mmol/L      eGFR 110.0 mL/min/1.73     Narrative:      GFR Normal >60  Chronic Kidney Disease <60  Kidney Failure <15      CBC & Differential [[id number]]  (Abnormal) Collected: 09/18/23 0104    Specimen: Blood Updated: 09/18/23 0139    Narrative:      The following orders were created for panel order CBC & Differential.  Procedure                               Abnormality         Status                     ---------                               -----------         ------                     CBC Auto Differential[[id number]]        Abnormal            Final result                 Please view results for these tests on the individual orders.    CBC Auto Differential [[id number]]  (Abnormal) Collected: 09/18/23 0104    Specimen: Blood Updated: 09/18/23 0139     WBC 13.00 10*3/mm3      RBC 4.16 10*6/mm3      Hemoglobin 11.0 g/dL      Hematocrit 34.1 %      MCV 82.1 fL      MCH 26.5 pg      MCHC 32.3 g/dL      RDW 14.8 %      RDW-SD 45.1 fl      MPV 8.1 fL      Platelets 545 10*3/mm3      Neutrophil % 84.4 %      Lymphocyte % 11.9 %      Monocyte % 2.6 %      Eosinophil % 0.2 %      Basophil % 0.9 %      Neutrophils, Absolute 10.90 10*3/mm3      Lymphocytes, Absolute 1.50 10*3/mm3      Monocytes, Absolute 0.30 10*3/mm3      Eosinophils, Absolute 0.00 10*3/mm3      Basophils, Absolute 0.10 10*3/mm3      nRBC 0.1 /100 WBC     Rheumatoid Factor [[id number]] Collected: 09/17/23 2352    Specimen: Blood Updated: 09/18/23 0000    MILADIS [[id number]] Collected: 09/17/23 2352    Specimen: Blood Updated: 09/18/23 0000    Comprehensive Metabolic Panel [[id number]]  (Abnormal) Collected: 09/17/23 2140    Specimen: Blood Updated: 09/17/23 2207     Glucose 366 mg/dL      BUN 19 mg/dL      Creatinine 0.76 mg/dL      Sodium 138 mmol/L      Potassium 3.4 mmol/L      Chloride 101 mmol/L      CO2 23.0 mmol/L      Calcium 9.7 mg/dL      Total Protein 7.2 g/dL      Albumin 4.4 g/dL      ALT (SGPT) 11 U/L      AST (SGOT) 11 U/L      Alkaline  Phosphatase 66 U/L      Total Bilirubin 0.2 mg/dL      Globulin 2.8 gm/dL      A/G Ratio 1.6 g/dL      BUN/Creatinine Ratio 25.0     Anion Gap 14.0 mmol/L      eGFR 98.6 mL/min/1.73     Narrative:      GFR Normal >60  Chronic Kidney Disease <60  Kidney Failure <15      Hemoglobin A1c [[id number]]  (Abnormal) Collected: 09/17/23 2140    Specimen: Blood Updated: 09/17/23 2202     Hemoglobin A1C 10.10 %     Urinalysis, Microscopic Only - Urine, Clean Catch [[id number]]  (Abnormal) Collected: 09/17/23 2140    Specimen: Urine, Clean Catch Updated: 09/17/23 2154     RBC, UA 6-12 /HPF      WBC, UA 21-30 /HPF      Bacteria, UA None Seen /HPF      Squamous Epithelial Cells, UA 0-2 /HPF      Hyaline Casts, UA None Seen /LPF      Methodology Automated Microscopy    Urine Culture - Urine, Urine, Clean Catch [[id number]] Collected: 09/17/23 2140    Specimen: Urine, Clean Catch Updated: 09/17/23 2153    Urinalysis With Culture If Indicated - Urine, Clean Catch [[id number]]  (Abnormal) Collected: 09/17/23 2140    Specimen: Urine, Clean Catch Updated: 09/17/23 2148     Color, UA Yellow     Appearance, UA Clear     pH, UA 7.5     Specific Gravity, UA 1.030     Glucose, UA >=1000 mg/dL (3+)     Ketones, UA Trace     Bilirubin, UA Negative     Blood, UA Trace     Protein, UA Trace     Leuk Esterase, UA Trace     Nitrite, UA Negative     Urobilinogen, UA 1.0 E.U./dL    Narrative:      In absence of clinical symptoms, the presence of pyuria, bacteria, and/or nitrites on the urinalysis result does not correlate with infection.    CBC & Differential [[id number]]  (Abnormal) Collected: 09/17/23 2140    Specimen: Blood Updated: 09/17/23 2148    Narrative:      The following orders were created for panel order CBC & Differential.  Procedure                               Abnormality         Status                     ---------                               -----------         ------                     CBC Auto Differential[[id number]]         Abnormal            Final result                 Please view results for these tests on the individual orders.    CBC Auto Differential [[id number]]  (Abnormal) Collected: 09/17/23 2140    Specimen: Blood Updated: 09/17/23 2148     WBC 11.50 10*3/mm3      RBC 4.42 10*6/mm3      Hemoglobin 11.8 g/dL      Hematocrit 36.2 %      MCV 81.9 fL      MCH 26.7 pg      MCHC 32.5 g/dL      RDW 15.5 %      RDW-SD 47.7 fl      MPV 7.8 fL      Platelets 553 10*3/mm3      Neutrophil % 88.2 %      Lymphocyte % 7.1 %      Monocyte % 3.8 %      Eosinophil % 0.1 %      Basophil % 0.8 %      Neutrophils, Absolute 10.20 10*3/mm3      Lymphocytes, Absolute 0.80 10*3/mm3      Monocytes, Absolute 0.40 10*3/mm3      Eosinophils, Absolute 0.00 10*3/mm3      Basophils, Absolute 0.10 10*3/mm3      nRBC 0.0 /100 WBC     EBVCA(IgG / M) [[id number]] Collected: 09/17/23 2140    Specimen: Blood Updated: 09/17/23 2145             Imaging Results (Last 24 Hours)       ** No results found for the last 24 hours. **                 I reviewed the patient's new clinical results.    Medication Review:   Scheduled Meds:cefTRIAXone, 1,000 mg, Intravenous, Q24H  famotidine, 40 mg, Oral, Daily  insulin lispro, 2-9 Units, Subcutaneous, 4x Daily AC & at Bedtime  insulin lispro, 3-14 Units, Subcutaneous, TID With Meals  senna-docusate sodium, 2 tablet, Oral, BID  sodium chloride, 10 mL, Intravenous, Q12H      Continuous Infusions:   PRN Meds:.  acetaminophen    senna-docusate sodium **AND** polyethylene glycol **AND** bisacodyl **AND** bisacodyl    Calcium Replacement - Follow Nurse / BPA Driven Protocol    dextrose    dextrose    dextrose    dextrose    diphenhydrAMINE    glucagon (human recombinant)    Magnesium Standard Dose Replacement - Follow Nurse / BPA Driven Protocol    nitroglycerin    ondansetron    Phosphorus Replacement - Follow Nurse / BPA Driven Protocol    Potassium Replacement - Follow Nurse / BPA Driven Protocol    sodium chloride    sodium  chloride     Assessment & Plan       Diabetes mellitus with hyperglycemia    Contact dermatitis    Hypokalemia    Acute UTI (urinary tract infection)    Allergic reaction - unclear  trigger - may have been lisinopril, Lantus, cephalexin.  She has tolerated ceftriaxone since admission.  Will plan to hold lisinopril and Lantus and arrange outpatient allergy testing. Monitor for rebound symptoms    Recurrent urinary tract infection - CT stone protocol to rule out anatomic abnormality, stones, etc that may be predisposing her to infection. Pt requests ID eval.    Leukocytosis - steroids, UTI    Poorly controlled type 2 diabetes -previously controlled with oral medications; recently started on Lantus.  Pt requests eval with endocrinology.  Likely will need to avoid SGLT-2 inhibitors due to UTI's.        Plan for disposition:home soon.    Micaela Isabel MD  09/18/23  09:26 EDT

## 2023-09-19 LAB
ANION GAP SERPL CALCULATED.3IONS-SCNC: 10 MMOL/L (ref 5–15)
BACTERIA SPEC AEROBE CULT: ABNORMAL
BASOPHILS # BLD AUTO: 0.1 10*3/MM3 (ref 0–0.2)
BASOPHILS NFR BLD AUTO: 1 % (ref 0–1.5)
BUN SERPL-MCNC: 28 MG/DL (ref 6–20)
BUN/CREAT SERPL: 46.7 (ref 7–25)
CALCIUM SPEC-SCNC: 9.9 MG/DL (ref 8.6–10.5)
CHLORIDE SERPL-SCNC: 105 MMOL/L (ref 98–107)
CO2 SERPL-SCNC: 27 MMOL/L (ref 22–29)
CREAT SERPL-MCNC: 0.6 MG/DL (ref 0.57–1)
DEPRECATED RDW RBC AUTO: 48.1 FL (ref 37–54)
EBV VCA IGG SER IA-ACNC: 90.6 U/ML (ref 0–17.9)
EBV VCA IGM SER IA-ACNC: <36 U/ML (ref 0–35.9)
EGFRCR SERPLBLD CKD-EPI 2021: 113 ML/MIN/1.73
EOSINOPHIL # BLD AUTO: 0.2 10*3/MM3 (ref 0–0.4)
EOSINOPHIL NFR BLD AUTO: 1.5 % (ref 0.3–6.2)
ERYTHROCYTE [DISTWIDTH] IN BLOOD BY AUTOMATED COUNT: 15.7 % (ref 12.3–15.4)
GLUCOSE BLDC GLUCOMTR-MCNC: 131 MG/DL (ref 70–105)
GLUCOSE BLDC GLUCOMTR-MCNC: 144 MG/DL (ref 70–105)
GLUCOSE BLDC GLUCOMTR-MCNC: 77 MG/DL (ref 70–105)
GLUCOSE BLDC GLUCOMTR-MCNC: 83 MG/DL (ref 70–105)
GLUCOSE SERPL-MCNC: 100 MG/DL (ref 65–99)
HCT VFR BLD AUTO: 33.9 % (ref 34–46.6)
HGB BLD-MCNC: 11.1 G/DL (ref 12–15.9)
LYMPHOCYTES # BLD AUTO: 3.7 10*3/MM3 (ref 0.7–3.1)
LYMPHOCYTES NFR BLD AUTO: 35.1 % (ref 19.6–45.3)
MAGNESIUM SERPL-MCNC: 1.1 MG/DL (ref 1.6–2.6)
MCH RBC QN AUTO: 27 PG (ref 26.6–33)
MCHC RBC AUTO-ENTMCNC: 32.8 G/DL (ref 31.5–35.7)
MCV RBC AUTO: 82.2 FL (ref 79–97)
MONOCYTES # BLD AUTO: 0.6 10*3/MM3 (ref 0.1–0.9)
MONOCYTES NFR BLD AUTO: 6.2 % (ref 5–12)
NEUTROPHILS NFR BLD AUTO: 5.8 10*3/MM3 (ref 1.7–7)
NEUTROPHILS NFR BLD AUTO: 56.2 % (ref 42.7–76)
NRBC BLD AUTO-RTO: 0.1 /100 WBC (ref 0–0.2)
PLATELET # BLD AUTO: 389 10*3/MM3 (ref 140–450)
PMV BLD AUTO: 8.2 FL (ref 6–12)
POTASSIUM SERPL-SCNC: 3.7 MMOL/L (ref 3.5–5.2)
QT INTERVAL: 370 MS
QTC INTERVAL: 436 MS
RBC # BLD AUTO: 4.13 10*6/MM3 (ref 3.77–5.28)
SODIUM SERPL-SCNC: 142 MMOL/L (ref 136–145)
WBC NRBC COR # BLD: 10.4 10*3/MM3 (ref 3.4–10.8)

## 2023-09-19 PROCEDURE — 96366 THER/PROPH/DIAG IV INF ADDON: CPT

## 2023-09-19 PROCEDURE — 83735 ASSAY OF MAGNESIUM: CPT | Performed by: INTERNAL MEDICINE

## 2023-09-19 PROCEDURE — G0378 HOSPITAL OBSERVATION PER HR: HCPCS

## 2023-09-19 PROCEDURE — 93010 ELECTROCARDIOGRAM REPORT: CPT | Performed by: INTERNAL MEDICINE

## 2023-09-19 PROCEDURE — 96365 THER/PROPH/DIAG IV INF INIT: CPT

## 2023-09-19 PROCEDURE — 25010000002 MAGNESIUM SULFATE 2 GM/50ML SOLUTION: Performed by: NURSE PRACTITIONER

## 2023-09-19 PROCEDURE — 99214 OFFICE O/P EST MOD 30 MIN: CPT | Performed by: INTERNAL MEDICINE

## 2023-09-19 PROCEDURE — 82948 REAGENT STRIP/BLOOD GLUCOSE: CPT

## 2023-09-19 PROCEDURE — 85025 COMPLETE CBC W/AUTO DIFF WBC: CPT

## 2023-09-19 PROCEDURE — 63710000001 INSULIN GLARGINE PER 5 UNITS: Performed by: INTERNAL MEDICINE

## 2023-09-19 PROCEDURE — 25010000002 LEVOFLOXACIN PER 250 MG: Performed by: INTERNAL MEDICINE

## 2023-09-19 PROCEDURE — 63710000001 INSULIN LISPRO (HUMAN) PER 5 UNITS: Performed by: INTERNAL MEDICINE

## 2023-09-19 PROCEDURE — 93005 ELECTROCARDIOGRAM TRACING: CPT | Performed by: INTERNAL MEDICINE

## 2023-09-19 PROCEDURE — 80048 BASIC METABOLIC PNL TOTAL CA: CPT

## 2023-09-19 RX ORDER — MAGNESIUM SULFATE HEPTAHYDRATE 40 MG/ML
2 INJECTION, SOLUTION INTRAVENOUS
Status: COMPLETED | OUTPATIENT
Start: 2023-09-19 | End: 2023-09-19

## 2023-09-19 RX ADMIN — LEVOFLOXACIN 750 MG: 5 INJECTION, SOLUTION INTRAVENOUS at 15:53

## 2023-09-19 RX ADMIN — GABAPENTIN 100 MG: 100 CAPSULE ORAL at 15:53

## 2023-09-19 RX ADMIN — MAGNESIUM SULFATE HEPTAHYDRATE 2 G: 2 INJECTION, SOLUTION INTRAVENOUS at 17:42

## 2023-09-19 RX ADMIN — INSULIN GLARGINE 25 UNITS: 100 INJECTION, SOLUTION SUBCUTANEOUS at 08:50

## 2023-09-19 RX ADMIN — GABAPENTIN 100 MG: 100 CAPSULE ORAL at 21:30

## 2023-09-19 RX ADMIN — INSULIN LISPRO 8 UNITS: 100 INJECTION, SOLUTION INTRAVENOUS; SUBCUTANEOUS at 14:01

## 2023-09-19 RX ADMIN — Medication 10 ML: at 21:31

## 2023-09-19 RX ADMIN — NAPROXEN 250 MG: 250 TABLET ORAL at 17:36

## 2023-09-19 RX ADMIN — METFORMIN HYDROCHLORIDE 1000 MG: 500 TABLET, FILM COATED ORAL at 08:49

## 2023-09-19 RX ADMIN — GABAPENTIN 100 MG: 100 CAPSULE ORAL at 08:49

## 2023-09-19 RX ADMIN — Medication 10 ML: at 09:17

## 2023-09-19 RX ADMIN — MAGNESIUM SULFATE HEPTAHYDRATE 2 G: 2 INJECTION, SOLUTION INTRAVENOUS at 08:50

## 2023-09-19 RX ADMIN — MAGNESIUM SULFATE HEPTAHYDRATE 2 G: 2 INJECTION, SOLUTION INTRAVENOUS at 11:59

## 2023-09-19 RX ADMIN — METFORMIN HYDROCHLORIDE 1000 MG: 500 TABLET, FILM COATED ORAL at 17:36

## 2023-09-19 RX ADMIN — INSULIN LISPRO 8 UNITS: 100 INJECTION, SOLUTION INTRAVENOUS; SUBCUTANEOUS at 08:50

## 2023-09-19 RX ADMIN — NAPROXEN 250 MG: 250 TABLET ORAL at 08:49

## 2023-09-19 RX ADMIN — ROSUVASTATIN 20 MG: 10 TABLET, FILM COATED ORAL at 21:31

## 2023-09-19 RX ADMIN — CYCLOBENZAPRINE 10 MG: 10 TABLET, FILM COATED ORAL at 21:30

## 2023-09-19 NOTE — PROGRESS NOTES
Daily Progress Note    Patient Care Team:  Akash Weiner MD as PCP - General (Family Medicine)    Chief Complaint: Follow-up type 2 diabetes    HPI: Patient seen, blood sugar log reviewed.  Clinically doing well.  Blood sugars improving.    ROS:   Constitutional:  Denies fatigue, tiredness.    Respiratory: denies cough, shortness of breath.   Cardiovascular:  denies chest pain, edema   GI:  Denies abdominal pain, nausea, vomiting.         Vitals:    09/19/23 0727   BP: 122/80   Pulse: 83   Resp: 14   Temp: 97.9 °F (36.6 °C)   SpO2: 97%     Body mass index is 23.17 kg/m².    Physical Exam:  GEN: NAD, conversant  PSYCH: Awake and coherent      Results Review:     I reviewed the patient's new clinical results.    Glucose   Date Value Ref Range Status   09/19/2023 100 (H) 65 - 99 mg/dL Final     Sodium   Date Value Ref Range Status   09/19/2023 142 136 - 145 mmol/L Final     Potassium   Date Value Ref Range Status   09/19/2023 3.7 3.5 - 5.2 mmol/L Final     CO2   Date Value Ref Range Status   09/19/2023 27.0 22.0 - 29.0 mmol/L Final     Chloride   Date Value Ref Range Status   09/19/2023 105 98 - 107 mmol/L Final     Anion Gap   Date Value Ref Range Status   09/19/2023 10.0 5.0 - 15.0 mmol/L Final     Creatinine   Date Value Ref Range Status   09/19/2023 0.60 0.57 - 1.00 mg/dL Final     BUN   Date Value Ref Range Status   09/19/2023 28 (H) 6 - 20 mg/dL Final     BUN/Creatinine Ratio   Date Value Ref Range Status   09/19/2023 46.7 (H) 7.0 - 25.0 Final     Calcium   Date Value Ref Range Status   09/19/2023 9.9 8.6 - 10.5 mg/dL Final     Alkaline Phosphatase   Date Value Ref Range Status   09/17/2023 66 39 - 117 U/L Final     Total Protein   Date Value Ref Range Status   09/17/2023 7.2 6.0 - 8.5 g/dL Final     ALT (SGPT)   Date Value Ref Range Status   09/17/2023 11 1 - 33 U/L Final     AST (SGOT)   Date Value Ref Range Status   09/17/2023 11 1 - 32 U/L Final     Total Bilirubin   Date Value Ref Range Status    09/17/2023 0.2 0.0 - 1.2 mg/dL Final     Albumin   Date Value Ref Range Status   09/17/2023 4.4 3.5 - 5.2 g/dL Final     Globulin   Date Value Ref Range Status   09/17/2023 2.8 gm/dL Final     Magnesium   Date Value Ref Range Status   09/19/2023 1.1 (L) 1.6 - 2.6 mg/dL Final     Lab Results   Component Value Date    HGBA1C 10.10 (H) 09/17/2023    HGBA1C 6.2 (H) 12/06/2018     No results found for: GLUF, MICROALBUR  Results from last 7 days   Lab Units 09/19/23  0726 09/18/23  2140 09/18/23  1638 09/18/23  1107 09/18/23  0744   GLUCOSE mg/dL 131* 93 163* 275* 315*             Medication Review: Reviewed.     cyclobenzaprine, 10 mg, Oral, Nightly  gabapentin, 100 mg, Oral, TID  insulin glargine, 25 Units, Subcutaneous, Daily  insulin lispro, 2-12 Units, Subcutaneous, TID With Meals  insulin lispro, 8 Units, Subcutaneous, TID With Meals  levoFLOXacin, 750 mg, Intravenous, Q24H  magnesium sulfate, 2 g, Intravenous, Q2H  metFORMIN, 1,000 mg, Oral, BID With Meals  naproxen, 250 mg, Oral, BID With Meals  rosuvastatin, 20 mg, Oral, Nightly  senna-docusate sodium, 2 tablet, Oral, BID  sodium chloride, 10 mL, Intravenous, Q12H              Assessment and plan:  Diabetes mellitus type 2 with hyperglycemia: Uncontrolled but improving, will continue Lantus 25 units subcu daily along with lispro 8 units with each meal plus lispro sliding scale.  We will follow blood sugars and make adjustments.    UTI: On Levaquin at this time.    Alyssa Razo MD. FACE

## 2023-09-19 NOTE — CONSULTS
Urology Consult Note    Patient:Nora Tam :1980  Room:[facility]  Admit Date2023  Age:43 y.o.     SEX:female     DOS:2023     MR:[id number]     Visit:[id number]       Attending: Micaela Isabel MD  Referring Provider: Dr. Isabel  Reason for Consultation: Right ureteral calculus    Patient Care Team:  Akash Weiner MD as PCP - General (Family Medicine)    Chief complaint recurrent urinary tract infections    Subjective .     History of present illness: 43-year-old woman who was admitted with facial swelling.  She does states she has been having recurrent urinary tract infections over the last 3 months.  CT scan was performed which reveals a 7 mm partially obstructing stone in the proximal right ureter.  She also has a couple smaller stones 2 and 4 mm in the left kidney which are nonobstructing.  Patient is chronic back pain but does not have any colicky pain.    Review of Systems  10 point review of systems were reviewed and are negative except for:  Constitution:  positive for See HPI    History  Past Medical History:   Diagnosis Date    DDD (degenerative disc disease), cervical     Diabetes mellitus     PCOS (polycystic ovarian syndrome)     Porphyria      Past Surgical History:   Procedure Laterality Date    CERVICAL SPINE SURGERY       Social History     Socioeconomic History    Marital status: Single   Tobacco Use    Smoking status: Former     Types: Electronic Cigarette    Smokeless tobacco: Never   Vaping Use    Vaping Use: Former   Substance and Sexual Activity    Alcohol use: Yes     Comment: occassionally    Drug use: Never    Sexual activity: Defer     Family History   Problem Relation Age of Onset    No Known Problems Mother     No Known Problems Father     No Known Problems Sister     No Known Problems Brother     No Known Problems Maternal Aunt     No Known Problems Maternal Uncle     No Known Problems Paternal Aunt     No Known Problems Paternal Uncle     No Known Problems  Maternal Grandmother     No Known Problems Maternal Grandfather     No Known Problems Paternal Grandmother     No Known Problems Paternal Grandfather     No Known Problems Other     Anemia Neg Hx     Arrhythmia Neg Hx     Asthma Neg Hx     Clotting disorder Neg Hx     Fainting Neg Hx     Heart attack Neg Hx     Heart disease Neg Hx     Heart failure Neg Hx     Hyperlipidemia Neg Hx     Hypertension Neg Hx      Allergy  Allergies   Allergen Reactions    Adhesive Tape Other (See Comments)     Prior to Admission medications    Medication Sig Start Date End Date Taking? Authorizing Provider   gabapentin (NEURONTIN) 100 MG capsule 3 (Three) Times a Day. 12/29/19  Yes Yina Ken MD   insulin glargine (LANTUS, SEMGLEE) 100 UNIT/ML injection Inject 10 Units under the skin into the appropriate area as directed Every Night.   Yes Yina Ken MD   JANUMET  MG per tablet  12/27/19  Yes Yina Ken MD   lisinopril (PRINIVIL,ZESTRIL) 5 MG tablet  10/25/19  Yes Yina Ken MD   methylphenidate 27 MG CR tablet Take 2 tablets by mouth Every Morning   Yes Yina Ken MD   nabumetone (RELAFEN) 750 MG tablet  1/5/20  Yes Yina Ken MD   naproxen sodium (ALEVE) 220 MG tablet Take 1 tablet by mouth 2 (Two) Times a Day As Needed.   Yes Yina Ken MD   rosuvastatin (CRESTOR) 20 MG tablet Take 1 tablet by mouth Daily.   Yes Yina Ken MD   Tirzepatide (Mounjaro) 5 MG/0.5ML solution pen-injector Inject  under the skin into the appropriate area as directed 1 (One) Time Per Week.   Yes Yina Ken MD   TROKENDI  MG capsule sustained-release 24 hr  1/6/20  Yes Yina Ken MD   albuterol sulfate  (90 Base) MCG/ACT inhaler Inhale 2 puffs. 3/27/19   Yina Ken MD   calcium citrate (CALCITRATE) 950 MG tablet Take 1 tablet by mouth. 12/14/18   Yina Ken MD   Cholecalciferol 50 MCG (2000 UT) tablet Take 1  tablet by mouth Daily. 18   Yina Ken MD   cyclobenzaprine (FLEXERIL) 10 MG tablet  19   Yina Ken MD   Dulaglutide 0.75 MG/0.5ML solution pen-injector Inject  under the skin into the appropriate area as directed.  Patient not taking: Reported on 2023    Yina Ken MD   Flaxseed, Linseed, (FLAX SEED OIL PO) Take  by mouth.  Patient not taking: Reported on 2023    Yina Ken MD   prenatal vitamin tablet Take  by mouth Daily.    Yina Ken MD   PROBIOTIC PRODUCT PO Take 1 tablet by mouth Daily.    iYna Ken MD   Prucalopride Succinate 2 MG tablet Take  by mouth.    Yina Ken MD   simvastatin (ZOCOR) 10 MG tablet Take 10 mg by mouth Daily.  Patient not taking: Reported on 2023    Yina Ken MD   vitamin C (ASCORBIC ACID) 500 MG tablet Take 500 mg by mouth Daily.  Patient not taking: Reported on 2023    Yina Ken MD         Objective     tMax 24 hours:  Temp (24hrs), Av.1 °F (36.7 °C), Min:97.5 °F (36.4 °C), Max:98.7 °F (37.1 °C)    Vital Sign Ranges:  Temp:  [97.5 °F (36.4 °C)-98.7 °F (37.1 °C)] 97.9 °F (36.6 °C)  Heart Rate:  [78-88] 83  Resp:  [13-21] 14  BP: (104-127)/(72-84) 122/80  Intake and Output Last 3 Shifts:  I/O last 3 completed shifts:  In: 890 [P.O.:890]  Out: -       Physical Exam:   General Appearance: alert, appears stated age, and cooperative  Head: normocephalic, without obvious abnormality and atraumatic  Abdomen: soft non-tender, no guarding, and no rebound tenderness  Skin: no bleeding, bruising or rash  Neurologic: Mental Status orientated to person, place, time and situation    Results Review:     Lab Results (last 24 hours)       Procedure Component Value Units Date/Time    EBVCA(IgG / M) [[id number]]  (Abnormal) Collected: 23    Specimen: Blood Updated: 23 1412     EBV VCA IgM <36.0 U/mL      Comment:                                   Negative        <36.0                                   Equivocal 36.0 - 43.9                                   Positive        >43.9        EBV VCA IgG 90.6 U/mL      Comment:                                  Negative        <18.0                                   Equivocal 18.0 - 21.9                                   Positive        >21.9       Narrative:      Performed at:  [address] [place] [address]  [id number]  : Osmany Man PhD, Phone:  [phone]    POC Glucose Once [[id number]]  (Normal) Collected: 09/19/23 1135    Specimen: Blood Updated: 09/19/23 1138     Glucose 83 mg/dL      Comment: Serial Number: [id number]:  [id number]       Urine Culture - Urine, Urine, Clean Catch [[id number]]  (Abnormal)  (Susceptibility) Collected: 09/17/23 2140    Specimen: Urine, Clean Catch Updated: 09/19/23 0912     Urine Culture 25,000 CFU/mL Pseudomonas aeruginosa    Narrative:      Colonization of the urinary tract without infection is common. Treatment is discouraged unless the patient is symptomatic, pregnant, or undergoing an invasive urologic procedure.    Susceptibility        Pseudomonas aeruginosa      ROSS      Cefepime Susceptible      Ceftazidime Susceptible      Ciprofloxacin Susceptible      Gentamicin Susceptible      Levofloxacin Susceptible      Piperacillin + Tazobactam Susceptible                           POC Glucose Once [[id number]]  (Abnormal) Collected: 09/19/23 0726    Specimen: Blood Updated: 09/19/23 0727     Glucose 131 mg/dL      Comment: Serial Number: [id number]:  [id number]       Magnesium [[id number]]  (Abnormal) Collected: 09/19/23 0504    Specimen: Blood Updated: 09/19/23 0615     Magnesium 1.1 mg/dL     Basic Metabolic Panel [[id number]]  (Abnormal) Collected: 09/19/23 0504    Specimen: Blood Updated: 09/19/23 0615     Glucose 100 mg/dL      BUN 28 mg/dL      Creatinine 0.60 mg/dL      Sodium 142 mmol/L      Potassium 3.7 mmol/L      Chloride  105 mmol/L      CO2 27.0 mmol/L      Calcium 9.9 mg/dL      BUN/Creatinine Ratio 46.7     Anion Gap 10.0 mmol/L      eGFR 113.0 mL/min/1.73     Narrative:      GFR Normal >60  Chronic Kidney Disease <60  Kidney Failure <15      CBC & Differential [[id number]]  (Abnormal) Collected: 09/19/23 0504    Specimen: Blood Updated: 09/19/23 0600    Narrative:      The following orders were created for panel order CBC & Differential.  Procedure                               Abnormality         Status                     ---------                               -----------         ------                     CBC Auto Differential[[id number]]        Abnormal            Final result                 Please view results for these tests on the individual orders.    CBC Auto Differential [[id number]]  (Abnormal) Collected: 09/19/23 0504    Specimen: Blood Updated: 09/19/23 0600     WBC 10.40 10*3/mm3      RBC 4.13 10*6/mm3      Hemoglobin 11.1 g/dL      Hematocrit 33.9 %      MCV 82.2 fL      MCH 27.0 pg      MCHC 32.8 g/dL      RDW 15.7 %      RDW-SD 48.1 fl      MPV 8.2 fL      Platelets 389 10*3/mm3      Neutrophil % 56.2 %      Lymphocyte % 35.1 %      Monocyte % 6.2 %      Eosinophil % 1.5 %      Basophil % 1.0 %      Neutrophils, Absolute 5.80 10*3/mm3      Lymphocytes, Absolute 3.70 10*3/mm3      Monocytes, Absolute 0.60 10*3/mm3      Eosinophils, Absolute 0.20 10*3/mm3      Basophils, Absolute 0.10 10*3/mm3      nRBC 0.1 /100 WBC     POC Glucose Once [[id number]]  (Normal) Collected: 09/18/23 2140    Specimen: Blood Updated: 09/18/23 2142     Glucose 93 mg/dL      Comment: Serial Number: [id number]:  [id number]       POC Glucose Once [[id number]]  (Abnormal) Collected: 09/18/23 1638    Specimen: Blood Updated: 09/18/23 1641     Glucose 163 mg/dL      Comment: Serial Number: [id number]:  [id number]                Urine Culture   Date Value Ref Range Status   09/17/2023 25,000 CFU/mL Pseudomonas aeruginosa (A)  Final         Imaging Results (Last 7 Days)       Procedure Component Value Units Date/Time    CT Abdomen Pelvis Stone Protocol [[id number]] Collected: 09/18/23 1018     Updated: 09/18/23 1027    Narrative:      CT ABDOMEN PELVIS STONE PROTOCOL    Date of Exam: 9/18/2023 10:06 AM EDT    Indication: Recurrent urinary tract infection..    Comparison: None available.    Technique: Axial CT images were obtained of the abdomen and pelvis without the administration of contrast. Sagittal and coronal reconstructions were performed.  Automated exposure control and iterative reconstruction methods were used.      Findings:  There is mild to moderate asymmetric right hemidiaphragm elevation. Lung bases are clear. Heart size is within normal limits.    No focal liver lesions identified. The liver does not appear grossly cirrhotic or steatotic.    Multiple gallstones are present. No evidence of cholecystitis or choledocholithiasis or abnormal biliary dilation.    Benign calcified granulomatous changes are seen within the spleen. Pancreas, adrenals, are normal. Nonobstructing stone in the left mid kidney measures 4 mm. A stone in the left lower renal pole measures 2 mm.    A right ureteropelvic junction stone measures 7 x 5 mm, with mild right hydronephrosis. Urinary bladder, uterus and rectum are normal. Hysterectomy changes are present. Right ovarian cyst or cystic lesion measures 4.0 x 3.5 cm. No pelvic free fluid is   identified.    Bulky posterior disc osteophytes are present at L2-3, L4-5 and L5-S1. Suspected severe canal stenosis at the L1-2 level..         Impression:      Impression:    1. 7 mm obstructing right ureteropelvic junction stone with mild right hydronephrosis.  2. Nonobstructing left renal stones.  3. 4 mm right ovarian cyst or cystic lesion. This is slightly larger than typically seen for physiologic cyst. Consider pelvic ultrasound follow-up in 6 weeks or at a different phase of the menstrual cycle to ensure  improvement or resolution.      Electronically Signed: Melodie Jones MD    9/18/2023 10:25 AM EDT    Workstation ID: [id number]            Inpatient Meds:   Scheduled Meds:cyclobenzaprine, 10 mg, Oral, Nightly  gabapentin, 100 mg, Oral, TID  insulin glargine, 25 Units, Subcutaneous, Daily  insulin lispro, 2-12 Units, Subcutaneous, TID With Meals  insulin lispro, 8 Units, Subcutaneous, TID With Meals  levoFLOXacin, 750 mg, Intravenous, Q24H  metFORMIN, 1,000 mg, Oral, BID With Meals  naproxen, 250 mg, Oral, BID With Meals  rosuvastatin, 20 mg, Oral, Nightly  senna-docusate sodium, 2 tablet, Oral, BID  sodium chloride, 10 mL, Intravenous, Q12H       Continuous Infusions:    PRN Meds:.  acetaminophen    senna-docusate sodium **AND** polyethylene glycol **AND** bisacodyl **AND** bisacodyl    Calcium Replacement - Follow Nurse / BPA Driven Protocol    dextrose    dextrose    diphenhydrAMINE    glucagon (human recombinant)    Magnesium Standard Dose Replacement - Follow Nurse / BPA Driven Protocol    nitroglycerin    ondansetron    Phosphorus Replacement - Follow Nurse / BPA Driven Protocol    Potassium Replacement - Follow Nurse / BPA Driven Protocol    sodium chloride    sodium chloride      Assessment & Plan     Principal Problem:    Diabetes mellitus with hyperglycemia  Active Problems:    Contact dermatitis    Hypokalemia    Acute UTI (urinary tract infection)    Partially obstructing proximal right ureteral calculus  Small nonobstructing left renal calculi  Recurrent urinary tract infections  Acute urinary tract infection with Pseudomonas for which she is currently on Levaquin    Plan  Continue Levaquin  Plan cystoscopy with right ureteroscopic stone extraction laser lithotripsy including stent placement tomorrow by Dr. Phelps.  Risks, benefits, alternatives have been discussed with the patient and she wishes to proceed.      I discussed the patient's findings and my recommendations with patient    Thank you for this   consult    Cleveland Lopes MD  09/19/23  15:00 EDT

## 2023-09-19 NOTE — PROGRESS NOTES
LOS: 0 days   Patient Care Team:  Akash Weiner MD as PCP - General (Family Medicine)        Subjective     Interval History:     Patient Complaints:   Patient Denies:  NV       Review of Systems:    weak    Objective     Vital Signs  Temp:  [97.5 °F (36.4 °C)-98.7 °F (37.1 °C)] 97.9 °F (36.6 °C)  Heart Rate:  [78-88] 83  Resp:  [13-21] 14  BP: (104-127)/(72-84) 122/80    Physical Exam:     General Appearance:    Alert, cooperative, in no acute distress   Head:    Normocephalic, without obvious abnormality, atraumatic   Eyes:            Lids and lashes normal, conjunctivae and sclerae normal, no   icterus, no pallor, corneas clear, PERRLA   Ears:    Ears appear intact with no abnormalities noted   Throat:   No oral lesions, no thrush, oral mucosa moist   Neck:   No adenopathy, supple, trachea midline, no thyromegaly, no     carotid bruit, no JVD   Back:     No kyphosis present, no scoliosis present, no skin lesions,       erythema or scars, no tenderness to percussion or                   palpation,   range of motion normal   Lungs:     Clear to auscultation,respirations regular, even and                   unlabored    Heart:    Regular rhythm and normal rate, normal S1 and S2, no            murmur, no gallop, no rub, no click   Breast Exam:    Deferred   Abdomen:     Normal bowel sounds, no masses, no organomegaly, soft        non-tender, non-distended, no guarding, no rebound                 tenderness   Genitalia:    Deferred   Extremities:   Moves all extremities well, no edema, no cyanosis, no              redness   Pulses:   Pulses palpable and equal bilaterally   Skin:   No bleeding, bruising or rash   Lymph nodes:   No palpable adenopathy   Neurologic:   Cranial nerves 2 - 12 grossly intact, sensation intact, DTR        present and equal bilaterally          Results Review:      Lab Results (last 72 hours)       Procedure Component Value Units Date/Time    EBVCA(IgG / M) [[id number]]  (Abnormal)  Collected: 09/17/23 2140    Specimen: Blood Updated: 09/19/23 1412     EBV VCA IgM <36.0 U/mL      Comment:                                  Negative        <36.0                                   Equivocal 36.0 - 43.9                                   Positive        >43.9        EBV VCA IgG 90.6 U/mL      Comment:                                  Negative        <18.0                                   Equivocal 18.0 - 21.9                                   Positive        >21.9       Narrative:      Performed at:  [address] [place], [place] [id number]  : Osmany Man PhD, Phone:  [phone]    POC Glucose Once [[id number]]  (Normal) Collected: 09/19/23 1135    Specimen: Blood Updated: 09/19/23 1138     Glucose 83 mg/dL      Comment: Serial Number: [id number]:  [id number]       Urine Culture - Urine, Urine, Clean Catch [[id number]]  (Abnormal)  (Susceptibility) Collected: 09/17/23 2140    Specimen: Urine, Clean Catch Updated: 09/19/23 0912     Urine Culture 25,000 CFU/mL Pseudomonas aeruginosa    Narrative:      Colonization of the urinary tract without infection is common. Treatment is discouraged unless the patient is symptomatic, pregnant, or undergoing an invasive urologic procedure.    Susceptibility        Pseudomonas aeruginosa      ROSS      Cefepime Susceptible      Ceftazidime Susceptible      Ciprofloxacin Susceptible      Gentamicin Susceptible      Levofloxacin Susceptible      Piperacillin + Tazobactam Susceptible                           POC Glucose Once [[id number]]  (Abnormal) Collected: 09/19/23 0726    Specimen: Blood Updated: 09/19/23 0727     Glucose 131 mg/dL      Comment: Serial Number: [id number]:  [id number]       Magnesium [[id number]]  (Abnormal) Collected: 09/19/23 0504    Specimen: Blood Updated: 09/19/23 0615     Magnesium 1.1 mg/dL     Basic Metabolic Panel [[id number]]  (Abnormal) Collected: 09/19/23 0504    Specimen: Blood Updated:  09/19/23 0615     Glucose 100 mg/dL      BUN 28 mg/dL      Creatinine 0.60 mg/dL      Sodium 142 mmol/L      Potassium 3.7 mmol/L      Chloride 105 mmol/L      CO2 27.0 mmol/L      Calcium 9.9 mg/dL      BUN/Creatinine Ratio 46.7     Anion Gap 10.0 mmol/L      eGFR 113.0 mL/min/1.73     Narrative:      GFR Normal >60  Chronic Kidney Disease <60  Kidney Failure <15      CBC & Differential [[id number]]  (Abnormal) Collected: 09/19/23 0504    Specimen: Blood Updated: 09/19/23 0600    Narrative:      The following orders were created for panel order CBC & Differential.  Procedure                               Abnormality         Status                     ---------                               -----------         ------                     CBC Auto Differential[[id number]]        Abnormal            Final result                 Please view results for these tests on the individual orders.    CBC Auto Differential [[id number]]  (Abnormal) Collected: 09/19/23 0504    Specimen: Blood Updated: 09/19/23 0600     WBC 10.40 10*3/mm3      RBC 4.13 10*6/mm3      Hemoglobin 11.1 g/dL      Hematocrit 33.9 %      MCV 82.2 fL      MCH 27.0 pg      MCHC 32.8 g/dL      RDW 15.7 %      RDW-SD 48.1 fl      MPV 8.2 fL      Platelets 389 10*3/mm3      Neutrophil % 56.2 %      Lymphocyte % 35.1 %      Monocyte % 6.2 %      Eosinophil % 1.5 %      Basophil % 1.0 %      Neutrophils, Absolute 5.80 10*3/mm3      Lymphocytes, Absolute 3.70 10*3/mm3      Monocytes, Absolute 0.60 10*3/mm3      Eosinophils, Absolute 0.20 10*3/mm3      Basophils, Absolute 0.10 10*3/mm3      nRBC 0.1 /100 WBC     POC Glucose Once [[id number]]  (Normal) Collected: 09/18/23 2140    Specimen: Blood Updated: 09/18/23 2142     Glucose 93 mg/dL      Comment: Serial Number: [id number]:  [id number]       POC Glucose Once [[id number]]  (Abnormal) Collected: 09/18/23 1638    Specimen: Blood Updated: 09/18/23 1641     Glucose 163 mg/dL      Comment: Serial Number:  [id number]:  [id number]       MILADIS [[id number]]  (Normal) Collected: 09/17/23 2352    Specimen: Blood Updated: 09/18/23 1110     Antinuclear Antibodies (MILADIS) Negative    POC Glucose Once [[id number]]  (Abnormal) Collected: 09/18/23 1107    Specimen: Blood Updated: 09/18/23 1108     Glucose 275 mg/dL      Comment: Serial Number: [id number]:  [id number]       Rheumatoid Factor [[id number]]  (Normal) Collected: 09/17/23 2352    Specimen: Blood Updated: 09/18/23 1040     Rheumatoid Factor Quantitative <10.0 IU/mL     POC Glucose Once [[id number]]  (Abnormal) Collected: 09/18/23 0744    Specimen: Blood Updated: 09/18/23 0746     Glucose 315 mg/dL      Comment: Serial Number: [id number]:  [id number]       Basic Metabolic Panel [[id number]]  (Abnormal) Collected: 09/18/23 0104    Specimen: Blood Updated: 09/18/23 0158     Glucose 201 mg/dL      BUN 21 mg/dL      Creatinine 0.67 mg/dL      Sodium 143 mmol/L      Potassium 3.7 mmol/L      Chloride 106 mmol/L      CO2 24.0 mmol/L      Calcium 9.0 mg/dL      BUN/Creatinine Ratio 31.3     Anion Gap 13.0 mmol/L      eGFR 110.0 mL/min/1.73     Narrative:      GFR Normal >60  Chronic Kidney Disease <60  Kidney Failure <15      CBC & Differential [[id number]]  (Abnormal) Collected: 09/18/23 0104    Specimen: Blood Updated: 09/18/23 0139    Narrative:      The following orders were created for panel order CBC & Differential.  Procedure                               Abnormality         Status                     ---------                               -----------         ------                     CBC Auto Differential[[id number]]        Abnormal            Final result                 Please view results for these tests on the individual orders.    CBC Auto Differential [[id number]]  (Abnormal) Collected: 09/18/23 0104    Specimen: Blood Updated: 09/18/23 0139     WBC 13.00 10*3/mm3      RBC 4.16 10*6/mm3      Hemoglobin 11.0 g/dL      Hematocrit 34.1 %      MCV 82.1 fL       MCH 26.5 pg      MCHC 32.3 g/dL      RDW 14.8 %      RDW-SD 45.1 fl      MPV 8.1 fL      Platelets 545 10*3/mm3      Neutrophil % 84.4 %      Lymphocyte % 11.9 %      Monocyte % 2.6 %      Eosinophil % 0.2 %      Basophil % 0.9 %      Neutrophils, Absolute 10.90 10*3/mm3      Lymphocytes, Absolute 1.50 10*3/mm3      Monocytes, Absolute 0.30 10*3/mm3      Eosinophils, Absolute 0.00 10*3/mm3      Basophils, Absolute 0.10 10*3/mm3      nRBC 0.1 /100 WBC     Comprehensive Metabolic Panel [[id number]]  (Abnormal) Collected: 09/17/23 2140    Specimen: Blood Updated: 09/17/23 2207     Glucose 366 mg/dL      BUN 19 mg/dL      Creatinine 0.76 mg/dL      Sodium 138 mmol/L      Potassium 3.4 mmol/L      Chloride 101 mmol/L      CO2 23.0 mmol/L      Calcium 9.7 mg/dL      Total Protein 7.2 g/dL      Albumin 4.4 g/dL      ALT (SGPT) 11 U/L      AST (SGOT) 11 U/L      Alkaline Phosphatase 66 U/L      Total Bilirubin 0.2 mg/dL      Globulin 2.8 gm/dL      A/G Ratio 1.6 g/dL      BUN/Creatinine Ratio 25.0     Anion Gap 14.0 mmol/L      eGFR 98.6 mL/min/1.73     Narrative:      GFR Normal >60  Chronic Kidney Disease <60  Kidney Failure <15      Hemoglobin A1c [[id number]]  (Abnormal) Collected: 09/17/23 2140    Specimen: Blood Updated: 09/17/23 2202     Hemoglobin A1C 10.10 %     Urinalysis, Microscopic Only - Urine, Clean Catch [[id number]]  (Abnormal) Collected: 09/17/23 2140    Specimen: Urine, Clean Catch Updated: 09/17/23 2154     RBC, UA 6-12 /HPF      WBC, UA 21-30 /HPF      Bacteria, UA None Seen /HPF      Squamous Epithelial Cells, UA 0-2 /HPF      Hyaline Casts, UA None Seen /LPF      Methodology Automated Microscopy    Urinalysis With Culture If Indicated - Urine, Clean Catch [[id number]]  (Abnormal) Collected: 09/17/23 2140    Specimen: Urine, Clean Catch Updated: 09/17/23 2148     Color, UA Yellow     Appearance, UA Clear     pH, UA 7.5     Specific Gravity, UA 1.030     Glucose, UA >=1000 mg/dL (3+)     Ketones, UA Trace      Bilirubin, UA Negative     Blood, UA Trace     Protein, UA Trace     Leuk Esterase, UA Trace     Nitrite, UA Negative     Urobilinogen, UA 1.0 E.U./dL    Narrative:      In absence of clinical symptoms, the presence of pyuria, bacteria, and/or nitrites on the urinalysis result does not correlate with infection.    CBC & Differential [[id number]]  (Abnormal) Collected: 09/17/23 2140    Specimen: Blood Updated: 09/17/23 2148    Narrative:      The following orders were created for panel order CBC & Differential.  Procedure                               Abnormality         Status                     ---------                               -----------         ------                     CBC Auto Differential[[id number]]        Abnormal            Final result                 Please view results for these tests on the individual orders.    CBC Auto Differential [[id number]]  (Abnormal) Collected: 09/17/23 2140    Specimen: Blood Updated: 09/17/23 2148     WBC 11.50 10*3/mm3      RBC 4.42 10*6/mm3      Hemoglobin 11.8 g/dL      Hematocrit 36.2 %      MCV 81.9 fL      MCH 26.7 pg      MCHC 32.5 g/dL      RDW 15.5 %      RDW-SD 47.7 fl      MPV 7.8 fL      Platelets 553 10*3/mm3      Neutrophil % 88.2 %      Lymphocyte % 7.1 %      Monocyte % 3.8 %      Eosinophil % 0.1 %      Basophil % 0.8 %      Neutrophils, Absolute 10.20 10*3/mm3      Lymphocytes, Absolute 0.80 10*3/mm3      Monocytes, Absolute 0.40 10*3/mm3      Eosinophils, Absolute 0.00 10*3/mm3      Basophils, Absolute 0.10 10*3/mm3      nRBC 0.0 /100 WBC             Imaging Results (Last 72 Hours)       Procedure Component Value Units Date/Time    CT Abdomen Pelvis Stone Protocol [[id number]] Collected: 09/18/23 1018     Updated: 09/18/23 1027    Narrative:      CT ABDOMEN PELVIS STONE PROTOCOL    Date of Exam: 9/18/2023 10:06 AM EDT    Indication: Recurrent urinary tract infection..    Comparison: None available.    Technique: Axial CT images were obtained of the  abdomen and pelvis without the administration of contrast. Sagittal and coronal reconstructions were performed.  Automated exposure control and iterative reconstruction methods were used.      Findings:  There is mild to moderate asymmetric right hemidiaphragm elevation. Lung bases are clear. Heart size is within normal limits.    No focal liver lesions identified. The liver does not appear grossly cirrhotic or steatotic.    Multiple gallstones are present. No evidence of cholecystitis or choledocholithiasis or abnormal biliary dilation.    Benign calcified granulomatous changes are seen within the spleen. Pancreas, adrenals, are normal. Nonobstructing stone in the left mid kidney measures 4 mm. A stone in the left lower renal pole measures 2 mm.    A right ureteropelvic junction stone measures 7 x 5 mm, with mild right hydronephrosis. Urinary bladder, uterus and rectum are normal. Hysterectomy changes are present. Right ovarian cyst or cystic lesion measures 4.0 x 3.5 cm. No pelvic free fluid is   identified.    Bulky posterior disc osteophytes are present at L2-3, L4-5 and L5-S1. Suspected severe canal stenosis at the L1-2 level..         Impression:      Impression:    1. 7 mm obstructing right ureteropelvic junction stone with mild right hydronephrosis.  2. Nonobstructing left renal stones.  3. 4 mm right ovarian cyst or cystic lesion. This is slightly larger than typically seen for physiologic cyst. Consider pelvic ultrasound follow-up in 6 weeks or at a different phase of the menstrual cycle to ensure improvement or resolution.      Electronically Signed: Melodie Jones MD    9/18/2023 10:25 AM EDT    Workstation ID: [id number]              Medication Review:     Hospital Medications (active)         Dose Frequency Start End    acetaminophen (TYLENOL) tablet 650 mg 650 mg Every 4 Hours PRN 9/18/2023     Admin Instructions: If given for fever, use fever parameter: fever greater than 100.4 °F  Based on patient  "request - if ordered for moderate or severe pain, provider allows for administration of a medication prescribed for a lower pain scale.    Do not exceed 4 grams of acetaminophen in a 24 hr period. Max dose of 2gm for AST/ALT greater than 120 units/L.    If given for pain, use the following pain scale:   Mild Pain = Pain Score of 1-3, CPOT 1-2  Moderate Pain = Pain Score of 4-6, CPOT 3-4  Severe Pain = Pain Score of 7-10, CPOT 5-8    Route: Oral    bisacodyl (DULCOLAX) EC tablet 5 mg 5 mg Daily PRN 9/18/2023     Admin Instructions: Use if no bowel movement after 12 hours.  Swallow whole. Do not crush, split, or chew tablet.    Route: Oral    Linked Group 1: See Hyperspace for full Linked Orders Report.        bisacodyl (DULCOLAX) suppository 10 mg 10 mg Daily PRN 9/18/2023     Admin Instructions: Use if no bowel movement after 12 hours.  Hold for diarrhea    Route: Rectal    Linked Group 1: See Hyperspace for full Linked Orders Report.        Calcium Replacement - Follow Nurse / BPA Driven Protocol  As Needed 9/18/2023     Admin Instructions: Open Order & Select \"[facility] Electrolyte Replacement Protocol Algorithm\" to View Details    Route: Does not apply    cyclobenzaprine (FLEXERIL) tablet 10 mg 10 mg Nightly 9/18/2023     Route: Oral    dextrose (D50W) (25 g/50 mL) IV injection 25 g 25 g Every 15 Minutes PRN 9/18/2023     Admin Instructions: Blood sugar less than 70; patient has IV access - Unresponsive, NPO or Unable To Safely Swallow    Route: Intravenous    dextrose (GLUTOSE) oral gel 15 g 15 g Every 15 Minutes PRN 9/18/2023     Admin Instructions: BS<70, Patient Alert, Is not NPO, Can safely swallow.    Route: Oral    diphenhydrAMINE (BENADRYL) injection 25 mg 25 mg Every 6 Hours PRN 9/18/2023     Admin Instructions: 25 mg may be given IV push over less than 1 minute.  Caution: Look alike/sound alike drug alert. This med may be ordered in other forms and routes. Before giving verify the last time the drug was given " "by any route/form.      Route: Intravenous    gabapentin (NEURONTIN) capsule 100 mg 100 mg 3 Times Daily 9/18/2023     Admin Instructions:     Route: Oral    glucagon (GLUCAGEN) injection 1 mg 1 mg Every 15 Minutes PRN 9/18/2023     Admin Instructions: Blood Glucose Less Than 70 - Patient Without IV Access - Unresponsive, NPO or Unable To Safely Swallow  Reconstitute powder for injection by adding 1 mL of -supplied sterile diluent or sterile water for injection to a vial containing 1 mg of the drug, to provide solutions containing 1 mg/mL. Shake vial gently to dissolve.    Route: Intramuscular    insulin glargine (LANTUS, SEMGLEE) injection 25 Units 25 Units Daily 9/18/2023     Admin Instructions: Do not hold basal insulin without an order. Consider requesting a dose edit, if needed.       Route: Subcutaneous    insulin lispro (HUMALOG/ADMELOG) injection 2-12 Units 2-12 Units 3 Times Daily With Meals 9/18/2023     Admin Instructions: Correction Insulin - Moderate-High Dose (Total Insulin Dose 60-80 units/day, Patient Taking Insulin at Home)    Blood Glucose 150-199 mg/dL - 2 units  Blood Glucose 200-249 mg/dL - 4 units  Blood Glucose 250-299 mg/dL - 6 units  Blood Glucose 300-349 mg/dL - 8 units  Blood Glucose 350-400 mg/dL - 10 units  Blood Glucose Greater Than 400 mg/dL - 12 units & Call Provider   Caution: Look alike/sound alike drug alert    Route: Subcutaneous    insulin lispro (HUMALOG/ADMELOG) injection 8 Units 8 Units 3 Times Daily With Meals 9/18/2023     Admin Instructions: \"Solution should be clear. If cloudy, contact pharmacy for a new vial.\"   Caution: Look alike/sound alike drug alert    Route: Subcutaneous    levoFLOXacin (LEVAQUIN) 750 mg/150 mL D5W (premix) (LEVAQUIN) 750 mg 750 mg Every 24 Hours 9/18/2023 9/23/2023    Admin Instructions: Caution: Look alike/sound alike drug alert. Protect from light. Do NOT refrigerate.    Route: Intravenous    Magnesium Standard Dose Replacement - " "Follow Nurse / BPA Driven Protocol  As Needed 9/18/2023     Admin Instructions: Open Order & Select \"[facility] Electrolyte Replacement Protocol Algorithm\" to View Details    Route: Does not apply    magnesium sulfate 2g/50 mL (PREMIX) infusion 2 g Every 2 Hours 9/19/2023 9/19/2023    Route: Intravenous    metFORMIN (GLUCOPHAGE) tablet 1,000 mg 1,000 mg 2 Times Daily With Meals 9/18/2023     Admin Instructions: Caution: Look alike/sound alike drug alert    Route: Oral    naproxen (NAPROSYN) tablet 250 mg 250 mg 2 Times Daily With Meals 9/18/2023     Admin Instructions: Take with food. Based on patient request - if ordered for moderate or severe pain, provider allows for administration of a medication prescribed for a lower pain scale.  If given for pain, use the following pain scale:  Mild Pain = Pain Score of 1-3, CPOT 1-2  Moderate Pain = Pain Score of 4-6, CPOT 3-4  Severe Pain = Pain Score of 7-10, CPOT 5-8    Route: Oral    nitroglycerin (NITROSTAT) SL tablet 0.4 mg 0.4 mg Every 5 Minutes PRN 9/18/2023     Admin Instructions: If Pain Unrelieved After 3 Doses Notify MD  May administer up to 3 doses per episode.    Route: Sublingual    ondansetron (ZOFRAN) injection 4 mg 4 mg Every 6 Hours PRN 9/18/2023     Admin Instructions: If BOTH ondansetron (ZOFRAN) and promethazine (PHENERGAN) are ordered use ondansetron first and THEN promethazine IF ondansetron is ineffective.    Route: Intravenous    Phosphorus Replacement - Follow Nurse / BPA Driven Protocol  As Needed 9/18/2023     Admin Instructions: Open Order & Select \"[facility] Electrolyte Replacement Protocol Algorithm\" to View Details    Route: Does not apply    polyethylene glycol (MIRALAX) packet 17 g 17 g Daily PRN 9/18/2023     Admin Instructions: Use if no bowel movement after 12 hours. Mix in 6-8 ounces of water.  Use 4-8 ounces of water, tea, or juice for each 17 gram dose.    Route: Oral    Linked Group 1: See Marvin for full Linked Orders Report.        " "Potassium Replacement - Follow Nurse / BPA Driven Protocol  As Needed 9/18/2023     Admin Instructions: Open Order & Select \"[facility] Electrolyte Replacement Protocol Algorithm\" to View Details    Route: Does not apply    rosuvastatin (CRESTOR) tablet 20 mg 20 mg Nightly 9/18/2023     Admin Instructions: Avoid grapefruit juice.    Route: Oral    sennosides-docusate (PERICOLACE) 8.6-50 MG per tablet 2 tablet 2 tablet 2 Times Daily 9/18/2023     Admin Instructions: HOLD MEDICATION IF PATIENT HAS HAD BOWEL MOVEMENT. Start bowel management regimen if patient has not had a bowel movement after 12 hours.    Route: Oral    Linked Group 1: See Marvin for full Linked Orders Report.        sodium chloride 0.9 % flush 10 mL 10 mL Every 12 Hours Scheduled 9/18/2023     Route: Intravenous    sodium chloride 0.9 % flush 10 mL 10 mL As Needed 9/18/2023     Route: Intravenous    sodium chloride 0.9 % infusion 40 mL 40 mL As Needed 9/18/2023     Admin Instructions: Following administration of an IV intermittent medication, flush line with 40mL NS at 100mL/hr.    Route: Intravenous          cyclobenzaprine, 10 mg, Oral, Nightly  gabapentin, 100 mg, Oral, TID  insulin glargine, 25 Units, Subcutaneous, Daily  insulin lispro, 2-12 Units, Subcutaneous, TID With Meals  insulin lispro, 8 Units, Subcutaneous, TID With Meals  levoFLOXacin, 750 mg, Intravenous, Q24H  magnesium sulfate, 2 g, Intravenous, Q2H  metFORMIN, 1,000 mg, Oral, BID With Meals  naproxen, 250 mg, Oral, BID With Meals  rosuvastatin, 20 mg, Oral, Nightly  senna-docusate sodium, 2 tablet, Oral, BID  sodium chloride, 10 mL, Intravenous, Q12H        Assessment & Plan         Diabetes mellitus with hyperglycemia    Contact dermatitis    Hypokalemia    Acute UTI (urinary tract infection)     Diabetes mellitus with hyperglycemia    Contact dermatitis    Hypokalemia    Acute UTI (urinary tract infection)   C&S Pseudomonas aeruginosa  Allergic reaction Keflex      Plan :    IV " levaquine or PO for  7 days  OK to DC home  Will follow  Thank you              Ryan Patrick MD  09/19/23  14:45 EDT

## 2023-09-19 NOTE — PLAN OF CARE
Problem: Hyperglycemia  Goal: Blood Glucose Level Within Targeted Range  Outcome: Ongoing, Progressing  Intervention: Optimize Glycemic Control  Flowsheets (Taken 9/19/2023 0400 by Octavio Dickerson RN)  Glycemic Management: blood glucose monitored     Problem: Adult Inpatient Plan of Care  Goal: Absence of Hospital-Acquired Illness or Injury  Intervention: Identify and Manage Fall Risk  Recent Flowsheet Documentation  Taken 9/19/2023 1600 by Alexis Rivera, RN  Safety Promotion/Fall Prevention:   clutter free environment maintained   assistive device/personal items within reach   fall prevention program maintained   lighting adjusted   nonskid shoes/slippers when out of bed   room organization consistent   safety round/check completed  Taken 9/19/2023 1400 by Alexis Rivera, RN  Safety Promotion/Fall Prevention:   assistive device/personal items within reach   clutter free environment maintained   fall prevention program maintained   lighting adjusted   safety round/check completed   room organization consistent   nonskid shoes/slippers when out of bed  Taken 9/19/2023 1200 by Alexis Rivera, RN  Safety Promotion/Fall Prevention:   safety round/check completed   room organization consistent   nonskid shoes/slippers when out of bed   lighting adjusted   fall prevention program maintained   clutter free environment maintained   assistive device/personal items within reach  Taken 9/19/2023 1000 by Alexis Rivera, RN  Safety Promotion/Fall Prevention:   safety round/check completed   room organization consistent   nonskid shoes/slippers when out of bed   mobility aid in reach   fall prevention program maintained   clutter free environment maintained   assistive device/personal items within reach  Taken 9/19/2023 0800 by Alexis Rivera, RN  Safety Promotion/Fall Prevention:   safety round/check completed   room organization consistent   nonskid shoes/slippers when out of bed   lighting adjusted   fall prevention program  maintained   clutter free environment maintained   assistive device/personal items within reach  Intervention: Prevent Skin Injury  Recent Flowsheet Documentation  Taken 9/19/2023 1600 by Alexis Rivera RN  Body Position: position changed independently  Taken 9/19/2023 1400 by Alexis Rivera RN  Body Position: position changed independently  Taken 9/19/2023 1200 by Alexis Rivera RN  Body Position: position changed independently  Taken 9/19/2023 1000 by Alexis Rivera RN  Body Position: position changed independently  Taken 9/19/2023 0800 by Alexis Rivera RN  Body Position: position changed independently  Intervention: Prevent and Manage VTE (Venous Thromboembolism) Risk  Recent Flowsheet Documentation  Taken 9/19/2023 1600 by Alexis Rivera RN  Activity Management: up ad neida  Taken 9/19/2023 1400 by Alexis Rivera RN  Activity Management: up ad neida  Taken 9/19/2023 1200 by Alexis Rivera RN  Activity Management: up ad neida  Taken 9/19/2023 1000 by Alexis Rivera RN  Activity Management: up ad neida  Taken 9/19/2023 0800 by Alexis Rivera RN  Activity Management: ambulated to bathroom  Range of Motion: active ROM (range of motion) encouraged  Intervention: Prevent Infection  Recent Flowsheet Documentation  Taken 9/19/2023 1600 by Alexis Rivera RN  Infection Prevention:   single patient room provided   rest/sleep promoted   hand hygiene promoted   equipment surfaces disinfected  Taken 9/19/2023 1400 by Alexis Rivera RN  Infection Prevention:   equipment surfaces disinfected   hand hygiene promoted   rest/sleep promoted   single patient room provided  Taken 9/19/2023 1200 by Alexis Rivera RN  Infection Prevention:   hand hygiene promoted   equipment surfaces disinfected   rest/sleep promoted  Taken 9/19/2023 1000 by Alexis Rivera RN  Infection Prevention:   single patient room provided   rest/sleep promoted   hand hygiene promoted   equipment surfaces disinfected  Taken 9/19/2023 0800 by Alexis Rivera RN  Infection  Prevention:   single patient room provided   rest/sleep promoted   hand hygiene promoted   equipment surfaces disinfected  Goal: Optimal Comfort and Wellbeing  Intervention: Monitor Pain and Promote Comfort  Flowsheets (Taken 9/19/2023 1723)  Pain Management Interventions:   quiet environment facilitated   pain management plan reviewed with patient/caregiver  Intervention: Provide Person-Centered Care  Recent Flowsheet Documentation  Taken 9/19/2023 0800 by Alexis Rivera, RN  Trust Relationship/Rapport:   care explained   emotional support provided   Goal Outcome Evaluation:  Plan of Care Reviewed With: patient

## 2023-09-19 NOTE — CASE MANAGEMENT/SOCIAL WORK
Discharge Planning Assessment  [facility]     Patient Name: Nora Tam  MRN: [id number]  Today's Date: 9/19/2023    Admit Date: 9/17/2023    Plan: Return home alone. Drive self home. Meds to Bed   Discharge Needs Assessment       Row Name 09/19/23 1101       Living Environment    People in Home alone    Current Living Arrangements home    Potentially Unsafe Housing Conditions none    Primary Care Provided by self    Provides Primary Care For no one    Family Caregiver if Needed none    Quality of Family Relationships supportive    Able to Return to Prior Arrangements yes       Resource/Environmental Concerns    Transportation Concerns none       Transition Planning    Patient/Family Anticipates Transition to home    Patient/Family Anticipated Services at Transition none    Transportation Anticipated car, drives self       Discharge Needs Assessment    Equipment Currently Used at Home glucometer    Anticipated Changes Related to Illness none    Equipment Needed After Discharge none                   Discharge Plan       Row Name 09/19/23 1102       Plan    Plan Return home alone. Drive self home. Meds to Bed    Patient/Family in Agreement with Plan yes    Plan Comments Barriers: IV antibiotics. Followed by Urology, ID and Endocrinology. CM met with Ms. Tam who lives alone , drives, works and is independent. She plans to drive herself home. Her only equipment is a glucometer. Verified PCP and she chose Meds to Bed                Demographic Summary       Row Name 09/19/23 1101       General Information    Admission Type observation    Arrived From emergency department    Referral Source admission list    Reason for Consult discharge planning    Preferred Language English       Contact Information    Permission Granted to Share Info With                    Functional Status       Row Name 09/19/23 1101       Functional Status    Usual Activity Tolerance good    Current Activity Tolerance moderate        Functional Status, IADL    Medications independent    Meal Preparation independent    Housekeeping independent    Laundry independent    Shopping independent    IADL Comments independent       Employment/    Employment Status employed full-time               Maintained distance greater than six feet and spent less than 15 minutes in the room.     Monse WEBB,RN Case Manager  [facility]  Phone: desk- [phone] cell- [phone]

## 2023-09-19 NOTE — PROGRESS NOTES
LOS: 0 days   Patient Care Team:  Akash Weiner MD as PCP - General (Family Medicine)    Subjective     Patient states she got some rest; when asked if she has had lower back pain she stated it would be difficult to tell since she has chronic back pain    Review of Systems   Constitutional:  Positive for activity change, appetite change and fatigue.   HENT: Negative.     Respiratory: Negative.     Cardiovascular: Negative.    Gastrointestinal: Negative.    Genitourinary:  Positive for difficulty urinating.   Musculoskeletal: Negative.    Neurological:  Positive for weakness.   Psychiatric/Behavioral: Negative.           Objective     Vital Signs  Temp:  [97.5 °F (36.4 °C)-98.7 °F (37.1 °C)] 97.9 °F (36.6 °C)  Heart Rate:  [78-88] 83  Resp:  [13-21] 14  BP: (104-127)/(72-89) 122/80      Physical Exam  Vitals reviewed.   Constitutional:       Appearance: She is not ill-appearing.   HENT:      Head: Normocephalic and atraumatic.      Right Ear: External ear normal.      Left Ear: External ear normal.      Nose: Nose normal.      Mouth/Throat:      Mouth: Mucous membranes are moist.   Eyes:      General:         Right eye: No discharge.         Left eye: No discharge.   Cardiovascular:      Rate and Rhythm: Normal rate and regular rhythm.      Pulses: Normal pulses.      Heart sounds: Normal heart sounds.   Pulmonary:      Effort: Pulmonary effort is normal.      Breath sounds: Normal breath sounds.   Abdominal:      General: Bowel sounds are normal.      Palpations: Abdomen is soft.   Musculoskeletal:         General: Normal range of motion.      Cervical back: Normal range of motion.   Skin:     General: Skin is warm and dry.   Neurological:      Mental Status: She is alert and oriented to person, place, and time.   Psychiatric:         Behavior: Behavior normal.            Results Review:    Lab Results (last 24 hours)       Procedure Component Value Units Date/Time    Urine Culture - Urine, Urine, Clean  Catch [[id number]]  (Abnormal)  (Susceptibility) Collected: 09/17/23 2140    Specimen: Urine, Clean Catch Updated: 09/19/23 0912     Urine Culture 25,000 CFU/mL Pseudomonas aeruginosa    Narrative:      Colonization of the urinary tract without infection is common. Treatment is discouraged unless the patient is symptomatic, pregnant, or undergoing an invasive urologic procedure.    Susceptibility        Pseudomonas aeruginosa      ROSS      Cefepime Susceptible      Ceftazidime Susceptible      Ciprofloxacin Susceptible      Gentamicin Susceptible      Levofloxacin Susceptible      Piperacillin + Tazobactam Susceptible                           POC Glucose Once [[id number]]  (Abnormal) Collected: 09/19/23 0726    Specimen: Blood Updated: 09/19/23 0727     Glucose 131 mg/dL      Comment: Serial Number: [id number]:  [id number]       Magnesium [[id number]]  (Abnormal) Collected: 09/19/23 0504    Specimen: Blood Updated: 09/19/23 0615     Magnesium 1.1 mg/dL     Basic Metabolic Panel [[id number]]  (Abnormal) Collected: 09/19/23 0504    Specimen: Blood Updated: 09/19/23 0615     Glucose 100 mg/dL      BUN 28 mg/dL      Creatinine 0.60 mg/dL      Sodium 142 mmol/L      Potassium 3.7 mmol/L      Chloride 105 mmol/L      CO2 27.0 mmol/L      Calcium 9.9 mg/dL      BUN/Creatinine Ratio 46.7     Anion Gap 10.0 mmol/L      eGFR 113.0 mL/min/1.73     Narrative:      GFR Normal >60  Chronic Kidney Disease <60  Kidney Failure <15      CBC & Differential [[id number]]  (Abnormal) Collected: 09/19/23 0504    Specimen: Blood Updated: 09/19/23 0600    Narrative:      The following orders were created for panel order CBC & Differential.  Procedure                               Abnormality         Status                     ---------                               -----------         ------                     CBC Auto Differential[[id number]]        Abnormal            Final result                 Please view results for these tests on  the individual orders.    CBC Auto Differential [[id number]]  (Abnormal) Collected: 09/19/23 0504    Specimen: Blood Updated: 09/19/23 0600     WBC 10.40 10*3/mm3      RBC 4.13 10*6/mm3      Hemoglobin 11.1 g/dL      Hematocrit 33.9 %      MCV 82.2 fL      MCH 27.0 pg      MCHC 32.8 g/dL      RDW 15.7 %      RDW-SD 48.1 fl      MPV 8.2 fL      Platelets 389 10*3/mm3      Neutrophil % 56.2 %      Lymphocyte % 35.1 %      Monocyte % 6.2 %      Eosinophil % 1.5 %      Basophil % 1.0 %      Neutrophils, Absolute 5.80 10*3/mm3      Lymphocytes, Absolute 3.70 10*3/mm3      Monocytes, Absolute 0.60 10*3/mm3      Eosinophils, Absolute 0.20 10*3/mm3      Basophils, Absolute 0.10 10*3/mm3      nRBC 0.1 /100 WBC     POC Glucose Once [[id number]]  (Normal) Collected: 09/18/23 2140    Specimen: Blood Updated: 09/18/23 2142     Glucose 93 mg/dL      Comment: Serial Number: [id number]:  [id number]       POC Glucose Once [[id number]]  (Abnormal) Collected: 09/18/23 1638    Specimen: Blood Updated: 09/18/23 1641     Glucose 163 mg/dL      Comment: Serial Number: [id number]:  [id number]       MILADIS [[id number]]  (Normal) Collected: 09/17/23 2352    Specimen: Blood Updated: 09/18/23 1110     Antinuclear Antibodies (MILADIS) Negative    POC Glucose Once [[id number]]  (Abnormal) Collected: 09/18/23 1107    Specimen: Blood Updated: 09/18/23 1108     Glucose 275 mg/dL      Comment: Serial Number: [id number]:  [id number]       Rheumatoid Factor [[id number]]  (Normal) Collected: 09/17/23 2352    Specimen: Blood Updated: 09/18/23 1040     Rheumatoid Factor Quantitative <10.0 IU/mL              Imaging Results (Last 24 Hours)       Procedure Component Value Units Date/Time    CT Abdomen Pelvis Stone Protocol [[id number]] Collected: 09/18/23 1018     Updated: 09/18/23 1027    Narrative:      CT ABDOMEN PELVIS STONE PROTOCOL    Date of Exam: 9/18/2023 10:06 AM EDT    Indication: Recurrent urinary tract infection..    Comparison: None  available.    Technique: Axial CT images were obtained of the abdomen and pelvis without the administration of contrast. Sagittal and coronal reconstructions were performed.  Automated exposure control and iterative reconstruction methods were used.      Findings:  There is mild to moderate asymmetric right hemidiaphragm elevation. Lung bases are clear. Heart size is within normal limits.    No focal liver lesions identified. The liver does not appear grossly cirrhotic or steatotic.    Multiple gallstones are present. No evidence of cholecystitis or choledocholithiasis or abnormal biliary dilation.    Benign calcified granulomatous changes are seen within the spleen. Pancreas, adrenals, are normal. Nonobstructing stone in the left mid kidney measures 4 mm. A stone in the left lower renal pole measures 2 mm.    A right ureteropelvic junction stone measures 7 x 5 mm, with mild right hydronephrosis. Urinary bladder, uterus and rectum are normal. Hysterectomy changes are present. Right ovarian cyst or cystic lesion measures 4.0 x 3.5 cm. No pelvic free fluid is   identified.    Bulky posterior disc osteophytes are present at L2-3, L4-5 and L5-S1. Suspected severe canal stenosis at the L1-2 level..         Impression:      Impression:    1. 7 mm obstructing right ureteropelvic junction stone with mild right hydronephrosis.  2. Nonobstructing left renal stones.  3. 4 mm right ovarian cyst or cystic lesion. This is slightly larger than typically seen for physiologic cyst. Consider pelvic ultrasound follow-up in 6 weeks or at a different phase of the menstrual cycle to ensure improvement or resolution.      Electronically Signed: Melodie Jones MD    9/18/2023 10:25 AM EDT    Workstation ID: [id number]                 I reviewed the patient's new clinical results.    Medication Review:   Scheduled Meds:cyclobenzaprine, 10 mg, Oral, Nightly  gabapentin, 100 mg, Oral, TID  insulin glargine, 25 Units, Subcutaneous,  Daily  insulin lispro, 2-12 Units, Subcutaneous, TID With Meals  insulin lispro, 8 Units, Subcutaneous, TID With Meals  levoFLOXacin, 750 mg, Intravenous, Q24H  magnesium sulfate, 2 g, Intravenous, Q2H  metFORMIN, 1,000 mg, Oral, BID With Meals  naproxen, 250 mg, Oral, BID With Meals  rosuvastatin, 20 mg, Oral, Nightly  senna-docusate sodium, 2 tablet, Oral, BID  sodium chloride, 10 mL, Intravenous, Q12H      Continuous Infusions:   PRN Meds:.  acetaminophen    senna-docusate sodium **AND** polyethylene glycol **AND** bisacodyl **AND** bisacodyl    Calcium Replacement - Follow Nurse / BPA Driven Protocol    dextrose    dextrose    diphenhydrAMINE    glucagon (human recombinant)    Magnesium Standard Dose Replacement - Follow Nurse / BPA Driven Protocol    nitroglycerin    ondansetron    Phosphorus Replacement - Follow Nurse / BPA Driven Protocol    Potassium Replacement - Follow Nurse / BPA Driven Protocol    sodium chloride    sodium chloride     Interval History:    Patient presented to the emergency department with complaints of facial swelling for 24 hours.  She has been treated intermittently for recurrent urinary tract infections over the last 3 months.  She also states that prior to this her A1c's have been controlled but recently she has had elevated blood sugars in the 300 range.  ID was consulted and facial swelling has resolved. Endocrinology was consulted for hyperglycemia and medication adjustments.  CT stone protocol resulted with a 7 mm obstructive right ureteropelvic junction stone with mild right-hydronephrosis       Assessment & Plan     Right kidney stone  -7 mm obstructing right ureteropelvic junction stone with mild right hydronephrosis.  -Non-obstructing left renal stones.  - 4 mm right ovarian cyst or cystic lesion. This is slightly larger than typically seen for physiologic cyst. Consider pelvic ultrasound follow-up in 6 weeks or at a different phase of the menstrual cycle to ensure  improvement or resolution.  -Urology consult    Leukocytosis  Contact dermatitis-resolved  Diabetes mellitus type 2 with hyperglycemia  Hypokalemia  Acute urinary tract infection    Plan for disposition:Home    ANURADHA Oliva  09/19/23  10:11 EDT

## 2023-09-20 ENCOUNTER — ANESTHESIA EVENT (OUTPATIENT)
Dept: PERIOP | Facility: HOSPITAL | Age: 43
End: 2023-09-20
Payer: COMMERCIAL

## 2023-09-20 ENCOUNTER — ANESTHESIA (OUTPATIENT)
Dept: PERIOP | Facility: HOSPITAL | Age: 43
End: 2023-09-20
Payer: COMMERCIAL

## 2023-09-20 VITALS
BODY MASS INDEX: 23.12 KG/M2 | WEIGHT: 161.49 LBS | HEIGHT: 70 IN | RESPIRATION RATE: 14 BRPM | SYSTOLIC BLOOD PRESSURE: 117 MMHG | OXYGEN SATURATION: 99 % | TEMPERATURE: 98.2 F | HEART RATE: 93 BPM | DIASTOLIC BLOOD PRESSURE: 80 MMHG

## 2023-09-20 PROBLEM — N20.0 KIDNEY STONE: Status: ACTIVE | Noted: 2023-09-20

## 2023-09-20 LAB
ANION GAP SERPL CALCULATED.3IONS-SCNC: 13 MMOL/L (ref 5–15)
B-HCG UR QL: NEGATIVE
BASOPHILS # BLD AUTO: 0.1 10*3/MM3 (ref 0–0.2)
BASOPHILS NFR BLD AUTO: 1.2 % (ref 0–1.5)
BUN SERPL-MCNC: 29 MG/DL (ref 6–20)
BUN/CREAT SERPL: 46.8 (ref 7–25)
CALCIUM SPEC-SCNC: 8.9 MG/DL (ref 8.6–10.5)
CHLORIDE SERPL-SCNC: 104 MMOL/L (ref 98–107)
CO2 SERPL-SCNC: 23 MMOL/L (ref 22–29)
CREAT SERPL-MCNC: 0.62 MG/DL (ref 0.57–1)
DEPRECATED RDW RBC AUTO: 45.9 FL (ref 37–54)
EGFRCR SERPLBLD CKD-EPI 2021: 112.1 ML/MIN/1.73
EOSINOPHIL # BLD AUTO: 0.2 10*3/MM3 (ref 0–0.4)
EOSINOPHIL NFR BLD AUTO: 1.9 % (ref 0.3–6.2)
ERYTHROCYTE [DISTWIDTH] IN BLOOD BY AUTOMATED COUNT: 15.6 % (ref 12.3–15.4)
GLUCOSE BLDC GLUCOMTR-MCNC: 113 MG/DL (ref 70–105)
GLUCOSE BLDC GLUCOMTR-MCNC: 120 MG/DL (ref 70–105)
GLUCOSE BLDC GLUCOMTR-MCNC: 143 MG/DL (ref 70–105)
GLUCOSE BLDC GLUCOMTR-MCNC: 164 MG/DL (ref 70–105)
GLUCOSE BLDC GLUCOMTR-MCNC: 168 MG/DL (ref 70–105)
GLUCOSE BLDC GLUCOMTR-MCNC: 46 MG/DL (ref 70–105)
GLUCOSE BLDC GLUCOMTR-MCNC: 53 MG/DL (ref 70–105)
GLUCOSE SERPL-MCNC: 138 MG/DL (ref 65–99)
HCT VFR BLD AUTO: 33.7 % (ref 34–46.6)
HGB BLD-MCNC: 10.8 G/DL (ref 12–15.9)
LYMPHOCYTES # BLD AUTO: 3.3 10*3/MM3 (ref 0.7–3.1)
LYMPHOCYTES NFR BLD AUTO: 33.2 % (ref 19.6–45.3)
MAGNESIUM SERPL-MCNC: 1.9 MG/DL (ref 1.6–2.6)
MCH RBC QN AUTO: 27 PG (ref 26.6–33)
MCHC RBC AUTO-ENTMCNC: 32.2 G/DL (ref 31.5–35.7)
MCV RBC AUTO: 83.8 FL (ref 79–97)
MONOCYTES # BLD AUTO: 0.7 10*3/MM3 (ref 0.1–0.9)
MONOCYTES NFR BLD AUTO: 6.9 % (ref 5–12)
NEUTROPHILS NFR BLD AUTO: 5.6 10*3/MM3 (ref 1.7–7)
NEUTROPHILS NFR BLD AUTO: 56.8 % (ref 42.7–76)
NRBC BLD AUTO-RTO: 0 /100 WBC (ref 0–0.2)
PLATELET # BLD AUTO: 363 10*3/MM3 (ref 140–450)
PMV BLD AUTO: 8.2 FL (ref 6–12)
POTASSIUM SERPL-SCNC: 4 MMOL/L (ref 3.5–5.2)
RBC # BLD AUTO: 4.02 10*6/MM3 (ref 3.77–5.28)
SODIUM SERPL-SCNC: 140 MMOL/L (ref 136–145)
WBC NRBC COR # BLD: 9.8 10*3/MM3 (ref 3.4–10.8)

## 2023-09-20 PROCEDURE — 83735 ASSAY OF MAGNESIUM: CPT | Performed by: NURSE PRACTITIONER

## 2023-09-20 PROCEDURE — 63710000001 INSULIN LISPRO (HUMAN) PER 5 UNITS: Performed by: INTERNAL MEDICINE

## 2023-09-20 PROCEDURE — 25010000002 LEVOFLOXACIN PER 250 MG: Performed by: UROLOGY

## 2023-09-20 PROCEDURE — 25010000002 DEXAMETHASONE PER 1 MG: Performed by: ANESTHESIOLOGIST ASSISTANT

## 2023-09-20 PROCEDURE — C2617 STENT, NON-COR, TEM W/O DEL: HCPCS | Performed by: UROLOGY

## 2023-09-20 PROCEDURE — G0378 HOSPITAL OBSERVATION PER HR: HCPCS

## 2023-09-20 PROCEDURE — 85025 COMPLETE CBC W/AUTO DIFF WBC: CPT

## 2023-09-20 PROCEDURE — C1758 CATHETER, URETERAL: HCPCS | Performed by: UROLOGY

## 2023-09-20 PROCEDURE — 81025 URINE PREGNANCY TEST: CPT | Performed by: UROLOGY

## 2023-09-20 PROCEDURE — 82365 CALCULUS SPECTROSCOPY: CPT | Performed by: UROLOGY

## 2023-09-20 PROCEDURE — 25010000002 ONDANSETRON PER 1 MG: Performed by: ANESTHESIOLOGIST ASSISTANT

## 2023-09-20 PROCEDURE — 25010000002 FENTANYL CITRATE (PF) 100 MCG/2ML SOLUTION: Performed by: ANESTHESIOLOGIST ASSISTANT

## 2023-09-20 PROCEDURE — 25010000002 LEVOFLOXACIN PER 250 MG: Performed by: ANESTHESIOLOGIST ASSISTANT

## 2023-09-20 PROCEDURE — 25010000002 PROPOFOL 200 MG/20ML EMULSION: Performed by: ANESTHESIOLOGIST ASSISTANT

## 2023-09-20 PROCEDURE — 96375 TX/PRO/DX INJ NEW DRUG ADDON: CPT

## 2023-09-20 PROCEDURE — 82948 REAGENT STRIP/BLOOD GLUCOSE: CPT

## 2023-09-20 PROCEDURE — 99214 OFFICE O/P EST MOD 30 MIN: CPT | Performed by: INTERNAL MEDICINE

## 2023-09-20 PROCEDURE — 80048 BASIC METABOLIC PNL TOTAL CA: CPT

## 2023-09-20 DEVICE — URETERAL STENT
Type: IMPLANTABLE DEVICE | Site: URETER | Status: FUNCTIONAL
Brand: PERCUFLEX™ PLUS

## 2023-09-20 RX ORDER — DIPHENHYDRAMINE HYDROCHLORIDE 50 MG/ML
12.5 INJECTION INTRAMUSCULAR; INTRAVENOUS
Status: DISCONTINUED | OUTPATIENT
Start: 2023-09-20 | End: 2023-09-20 | Stop reason: HOSPADM

## 2023-09-20 RX ORDER — IPRATROPIUM BROMIDE AND ALBUTEROL SULFATE 2.5; .5 MG/3ML; MG/3ML
3 SOLUTION RESPIRATORY (INHALATION) ONCE AS NEEDED
Status: DISCONTINUED | OUTPATIENT
Start: 2023-09-20 | End: 2023-09-20 | Stop reason: HOSPADM

## 2023-09-20 RX ORDER — ONDANSETRON 2 MG/ML
INJECTION INTRAMUSCULAR; INTRAVENOUS AS NEEDED
Status: DISCONTINUED | OUTPATIENT
Start: 2023-09-20 | End: 2023-09-20 | Stop reason: SURG

## 2023-09-20 RX ORDER — LEVOFLOXACIN 750 MG/1
750 TABLET ORAL DAILY
Qty: 11 TABLET | Refills: 0 | Status: SHIPPED | OUTPATIENT
Start: 2023-09-21

## 2023-09-20 RX ORDER — ACETAMINOPHEN 325 MG/1
650 TABLET ORAL EVERY 4 HOURS PRN
Start: 2023-09-20

## 2023-09-20 RX ORDER — DIPHENHYDRAMINE HCL 25 MG
25 CAPSULE ORAL
Status: DISCONTINUED | OUTPATIENT
Start: 2023-09-20 | End: 2023-09-20 | Stop reason: HOSPADM

## 2023-09-20 RX ORDER — NALOXONE HCL 0.4 MG/ML
0.4 VIAL (ML) INJECTION AS NEEDED
Status: DISCONTINUED | OUTPATIENT
Start: 2023-09-20 | End: 2023-09-20 | Stop reason: HOSPADM

## 2023-09-20 RX ORDER — INSULIN GLARGINE 100 [IU]/ML
10 INJECTION, SOLUTION SUBCUTANEOUS NIGHTLY
Refills: 12
Start: 2023-09-20

## 2023-09-20 RX ORDER — PROMETHAZINE HYDROCHLORIDE 25 MG/1
25 SUPPOSITORY RECTAL ONCE AS NEEDED
Status: DISCONTINUED | OUTPATIENT
Start: 2023-09-20 | End: 2023-09-20 | Stop reason: HOSPADM

## 2023-09-20 RX ORDER — INSULIN LISPRO 100 [IU]/ML
6 INJECTION, SOLUTION INTRAVENOUS; SUBCUTANEOUS
Status: DISCONTINUED | OUTPATIENT
Start: 2023-09-21 | End: 2023-09-20 | Stop reason: HOSPADM

## 2023-09-20 RX ORDER — LEVOFLOXACIN 5 MG/ML
INJECTION, SOLUTION INTRAVENOUS AS NEEDED
Status: DISCONTINUED | OUTPATIENT
Start: 2023-09-20 | End: 2023-09-20 | Stop reason: SURG

## 2023-09-20 RX ORDER — HYDRALAZINE HYDROCHLORIDE 20 MG/ML
5 INJECTION INTRAMUSCULAR; INTRAVENOUS
Status: DISCONTINUED | OUTPATIENT
Start: 2023-09-20 | End: 2023-09-20 | Stop reason: HOSPADM

## 2023-09-20 RX ORDER — DIPHENHYDRAMINE HYDROCHLORIDE 50 MG/ML
12.5 INJECTION INTRAMUSCULAR; INTRAVENOUS ONCE AS NEEDED
Status: DISCONTINUED | OUTPATIENT
Start: 2023-09-20 | End: 2023-09-20 | Stop reason: HOSPADM

## 2023-09-20 RX ORDER — SODIUM CHLORIDE, SODIUM LACTATE, POTASSIUM CHLORIDE, CALCIUM CHLORIDE 600; 310; 30; 20 MG/100ML; MG/100ML; MG/100ML; MG/100ML
INJECTION, SOLUTION INTRAVENOUS CONTINUOUS PRN
Status: DISCONTINUED | OUTPATIENT
Start: 2023-09-20 | End: 2023-09-20 | Stop reason: SURG

## 2023-09-20 RX ORDER — FENTANYL CITRATE 50 UG/ML
50 INJECTION, SOLUTION INTRAMUSCULAR; INTRAVENOUS
Status: DISCONTINUED | OUTPATIENT
Start: 2023-09-20 | End: 2023-09-20 | Stop reason: HOSPADM

## 2023-09-20 RX ORDER — HYDROCODONE BITARTRATE AND ACETAMINOPHEN 10; 325 MG/1; MG/1
1 TABLET ORAL EVERY 4 HOURS PRN
Status: DISCONTINUED | OUTPATIENT
Start: 2023-09-20 | End: 2023-09-20 | Stop reason: HOSPADM

## 2023-09-20 RX ORDER — LORAZEPAM 2 MG/ML
1 INJECTION INTRAMUSCULAR
Status: DISCONTINUED | OUTPATIENT
Start: 2023-09-20 | End: 2023-09-20 | Stop reason: HOSPADM

## 2023-09-20 RX ORDER — DEXAMETHASONE SODIUM PHOSPHATE 4 MG/ML
INJECTION, SOLUTION INTRA-ARTICULAR; INTRALESIONAL; INTRAMUSCULAR; INTRAVENOUS; SOFT TISSUE AS NEEDED
Status: DISCONTINUED | OUTPATIENT
Start: 2023-09-20 | End: 2023-09-20 | Stop reason: SURG

## 2023-09-20 RX ORDER — PROMETHAZINE HYDROCHLORIDE 25 MG/1
25 TABLET ORAL ONCE AS NEEDED
Status: DISCONTINUED | OUTPATIENT
Start: 2023-09-20 | End: 2023-09-20 | Stop reason: HOSPADM

## 2023-09-20 RX ORDER — FENTANYL CITRATE 50 UG/ML
INJECTION, SOLUTION INTRAMUSCULAR; INTRAVENOUS AS NEEDED
Status: DISCONTINUED | OUTPATIENT
Start: 2023-09-20 | End: 2023-09-20 | Stop reason: SURG

## 2023-09-20 RX ORDER — PROPOFOL 10 MG/ML
INJECTION, EMULSION INTRAVENOUS AS NEEDED
Status: DISCONTINUED | OUTPATIENT
Start: 2023-09-20 | End: 2023-09-20 | Stop reason: SURG

## 2023-09-20 RX ORDER — MIDAZOLAM HYDROCHLORIDE 1 MG/ML
2 INJECTION INTRAMUSCULAR; INTRAVENOUS
Status: DISCONTINUED | OUTPATIENT
Start: 2023-09-20 | End: 2023-09-20 | Stop reason: HOSPADM

## 2023-09-20 RX ORDER — ONDANSETRON 2 MG/ML
4 INJECTION INTRAMUSCULAR; INTRAVENOUS ONCE AS NEEDED
Status: DISCONTINUED | OUTPATIENT
Start: 2023-09-20 | End: 2023-09-20 | Stop reason: HOSPADM

## 2023-09-20 RX ORDER — ACETAMINOPHEN 325 MG/1
650 TABLET ORAL ONCE AS NEEDED
Status: DISCONTINUED | OUTPATIENT
Start: 2023-09-20 | End: 2023-09-20 | Stop reason: HOSPADM

## 2023-09-20 RX ORDER — LABETALOL HYDROCHLORIDE 5 MG/ML
5 INJECTION, SOLUTION INTRAVENOUS
Status: DISCONTINUED | OUTPATIENT
Start: 2023-09-20 | End: 2023-09-20 | Stop reason: HOSPADM

## 2023-09-20 RX ORDER — MEPERIDINE HYDROCHLORIDE 25 MG/ML
12.5 INJECTION INTRAMUSCULAR; INTRAVENOUS; SUBCUTANEOUS
Status: DISCONTINUED | OUTPATIENT
Start: 2023-09-20 | End: 2023-09-20 | Stop reason: HOSPADM

## 2023-09-20 RX ORDER — EPHEDRINE SULFATE 5 MG/ML
5 INJECTION INTRAVENOUS ONCE AS NEEDED
Status: DISCONTINUED | OUTPATIENT
Start: 2023-09-20 | End: 2023-09-20 | Stop reason: HOSPADM

## 2023-09-20 RX ADMIN — INSULIN LISPRO 8 UNITS: 100 INJECTION, SOLUTION INTRAVENOUS; SUBCUTANEOUS at 08:51

## 2023-09-20 RX ADMIN — DEXAMETHASONE SODIUM PHOSPHATE 4 MG: 4 INJECTION, SOLUTION INTRAMUSCULAR; INTRAVENOUS at 15:51

## 2023-09-20 RX ADMIN — GABAPENTIN 100 MG: 100 CAPSULE ORAL at 08:51

## 2023-09-20 RX ADMIN — LEVOFLOXACIN 750 MG: 5 INJECTION, SOLUTION INTRAVENOUS at 18:17

## 2023-09-20 RX ADMIN — LEVOFLOXACIN 750 MG: 5 INJECTION, SOLUTION INTRAVENOUS at 15:44

## 2023-09-20 RX ADMIN — SODIUM CHLORIDE, SODIUM LACTATE, POTASSIUM CHLORIDE, AND CALCIUM CHLORIDE: .6; .31; .03; .02 INJECTION, SOLUTION INTRAVENOUS at 15:44

## 2023-09-20 RX ADMIN — Medication 10 ML: at 08:52

## 2023-09-20 RX ADMIN — DEXTROSE 15 G: 15 GEL ORAL at 11:14

## 2023-09-20 RX ADMIN — FENTANYL CITRATE 100 MCG: 50 INJECTION, SOLUTION INTRAMUSCULAR; INTRAVENOUS at 15:44

## 2023-09-20 RX ADMIN — ONDANSETRON 4 MG: 2 INJECTION INTRAMUSCULAR; INTRAVENOUS at 15:51

## 2023-09-20 RX ADMIN — PROPOFOL 200 MG: 10 INJECTION, EMULSION INTRAVENOUS at 15:47

## 2023-09-20 RX ADMIN — DEXTROSE MONOHYDRATE 25 G: 25 INJECTION, SOLUTION INTRAVENOUS at 11:34

## 2023-09-20 RX ADMIN — METFORMIN HYDROCHLORIDE 1000 MG: 500 TABLET, FILM COATED ORAL at 08:51

## 2023-09-20 RX ADMIN — METFORMIN HYDROCHLORIDE 1000 MG: 500 TABLET, FILM COATED ORAL at 18:17

## 2023-09-20 RX ADMIN — NAPROXEN 250 MG: 250 TABLET ORAL at 18:17

## 2023-09-20 RX ADMIN — NAPROXEN 250 MG: 250 TABLET ORAL at 08:51

## 2023-09-20 RX ADMIN — GABAPENTIN 100 MG: 100 CAPSULE ORAL at 18:17

## 2023-09-20 RX ADMIN — LIDOCAINE HYDROCHLORIDE 50 MG: 20 INJECTION, SOLUTION INTRAVENOUS at 15:47

## 2023-09-20 NOTE — PROGRESS NOTES
[facility] DAILY PROGRESS NOTE    Patient Identification  Name: Nora Tam  Age: 43 y.o.  Sex: female  :  1980  MRN: [id number]    Date: 2023             Subjective:  Interval History: Afebrile, ready and n.p.o. for ureteroscopy    Objective:    Scheduled Meds:[MAR Hold] cyclobenzaprine, 10 mg, Oral, Nightly  gabapentin, 100 mg, Oral, TID  [MAR Hold] insulin glargine, 25 Units, Subcutaneous, Daily  [MAR Hold] insulin lispro, 2-12 Units, Subcutaneous, TID With Meals  [MAR Hold] insulin lispro, 8 Units, Subcutaneous, TID With Meals  levoFLOXacin, 750 mg, Intravenous, Q24H  [MAR Hold] metFORMIN, 1,000 mg, Oral, BID With Meals  [MAR Hold] naproxen, 250 mg, Oral, BID With Meals  [MAR Hold] rosuvastatin, 20 mg, Oral, Nightly  [MAR Hold] senna-docusate sodium, 2 tablet, Oral, BID  [MAR Hold] sodium chloride, 10 mL, Intravenous, Q12H      Continuous Infusions:niCARdipine, 5-15 mg/hr  phenylephrine, 0.5-3 mcg/kg/min      PRN Meds:  [MAR Hold] acetaminophen    acetaminophen    [MAR Hold] senna-docusate sodium **AND** [MAR Hold] polyethylene glycol **AND** [MAR Hold] bisacodyl **AND** [MAR Hold] bisacodyl    [MAR Hold] Calcium Replacement - Follow Nurse / BPA Driven Protocol    [MAR Hold] dextrose    [MAR Hold] dextrose    diphenhydrAMINE    diphenhydrAMINE    diphenhydrAMINE    [MAR Hold] diphenhydrAMINE    ePHEDrine Sulfate (Pressors)    fentanyl    [MAR Hold] glucagon (human recombinant)    hydrALAZINE    HYDROcodone-acetaminophen    HYDROmorphone    ipratropium-albuterol    labetalol    lidocaine (cardiac)    LORazepam    [MAR Hold] Magnesium Standard Dose Replacement - Follow Nurse / BPA Driven Protocol    meperidine    metoprolol tartrate    midazolam    naloxone    [MAR Hold] nitroglycerin    [MAR Hold] ondansetron    ondansetron    phenylephrine    [MAR Hold] Phosphorus Replacement - Follow Nurse / BPA Driven Protocol    Potassium Replacement - Follow Nurse / BPA Driven Protocol    promethazine  "**OR** promethazine    [MAR Hold] sodium chloride    [MAR Hold] sodium chloride    Vital signs in last 24 hours:  Temp:  [98 °F (36.7 °C)-98.6 °F (37 °C)] 98.6 °F (37 °C)  Heart Rate:  [] 96  Resp:  [13-24] 24  BP: ()/(58-89) 128/89    Intake/Output:    Intake/Output Summary (Last 24 hours) at 9/20/2023 1620  Last data filed at 9/20/2023 1613  Gross per 24 hour   Intake 1070 ml   Output --   Net 1070 ml       Exam:  /89   Pulse 96   Temp 98.6 °F (37 °C)   Resp 24   Ht 177.8 cm (70\")   Wt 73.2 kg (161 lb 7.8 oz)   LMP 08/17/2023 (Approximate)   SpO2 98%   BMI 23.17 kg/m²     General Appearance:    Alert, cooperative, no distress, appears stated age               Abdomen:     Soft, ND, NT   :    No suprapubic distention                Data Review:  All labs (24hrs):   Recent Results (from the past 24 hour(s))   POC Glucose Once    Collection Time: 09/19/23  4:29 PM    Specimen: Blood   Result Value Ref Range    Glucose 77 70 - 105 mg/dL   POC Glucose Once    Collection Time: 09/19/23  9:55 PM    Specimen: Blood   Result Value Ref Range    Glucose 144 (H) 70 - 105 mg/dL   Magnesium    Collection Time: 09/20/23  2:02 AM    Specimen: Blood   Result Value Ref Range    Magnesium 1.9 1.6 - 2.6 mg/dL   Basic Metabolic Panel    Collection Time: 09/20/23  2:02 AM    Specimen: Blood   Result Value Ref Range    Glucose 138 (H) 65 - 99 mg/dL    BUN 29 (H) 6 - 20 mg/dL    Creatinine 0.62 0.57 - 1.00 mg/dL    Sodium 140 136 - 145 mmol/L    Potassium 4.0 3.5 - 5.2 mmol/L    Chloride 104 98 - 107 mmol/L    CO2 23.0 22.0 - 29.0 mmol/L    Calcium 8.9 8.6 - 10.5 mg/dL    BUN/Creatinine Ratio 46.8 (H) 7.0 - 25.0    Anion Gap 13.0 5.0 - 15.0 mmol/L    eGFR 112.1 >60.0 mL/min/1.73   CBC Auto Differential    Collection Time: 09/20/23  2:02 AM    Specimen: Blood   Result Value Ref Range    WBC 9.80 3.40 - 10.80 10*3/mm3    RBC 4.02 3.77 - 5.28 10*6/mm3    Hemoglobin 10.8 (L) 12.0 - 15.9 g/dL    Hematocrit 33.7 (L) " 34.0 - 46.6 %    MCV 83.8 79.0 - 97.0 fL    MCH 27.0 26.6 - 33.0 pg    MCHC 32.2 31.5 - 35.7 g/dL    RDW 15.6 (H) 12.3 - 15.4 %    RDW-SD 45.9 37.0 - 54.0 fl    MPV 8.2 6.0 - 12.0 fL    Platelets 363 140 - 450 10*3/mm3    Neutrophil % 56.8 42.7 - 76.0 %    Lymphocyte % 33.2 19.6 - 45.3 %    Monocyte % 6.9 5.0 - 12.0 %    Eosinophil % 1.9 0.3 - 6.2 %    Basophil % 1.2 0.0 - 1.5 %    Neutrophils, Absolute 5.60 1.70 - 7.00 10*3/mm3    Lymphocytes, Absolute 3.30 (H) 0.70 - 3.10 10*3/mm3    Monocytes, Absolute 0.70 0.10 - 0.90 10*3/mm3    Eosinophils, Absolute 0.20 0.00 - 0.40 10*3/mm3    Basophils, Absolute 0.10 0.00 - 0.20 10*3/mm3    nRBC 0.0 0.0 - 0.2 /100 WBC   POC Glucose Once    Collection Time: 09/20/23  7:24 AM    Specimen: Blood   Result Value Ref Range    Glucose 113 (H) 70 - 105 mg/dL   POC Glucose Once    Collection Time: 09/20/23 11:09 AM    Specimen: Blood   Result Value Ref Range    Glucose 53 (L) 70 - 105 mg/dL   POC Glucose Once    Collection Time: 09/20/23 11:30 AM    Specimen: Blood   Result Value Ref Range    Glucose 46 (C) 70 - 105 mg/dL   POC Glucose Once    Collection Time: 09/20/23 11:56 AM    Specimen: Blood   Result Value Ref Range    Glucose 164 (H) 70 - 105 mg/dL   POC Glucose Once    Collection Time: 09/20/23 11:57 AM    Specimen: Blood   Result Value Ref Range    Glucose 143 (H) 70 - 105 mg/dL   Pregnancy, Urine - Urine, Clean Catch    Collection Time: 09/20/23 12:21 PM    Specimen: Urine, Clean Catch   Result Value Ref Range    HCG, Urine QL Negative Negative   POC Glucose Once    Collection Time: 09/20/23  1:15 PM    Specimen: Blood   Result Value Ref Range    Glucose 168 (H) 70 - 105 mg/dL   POC Glucose Once    Collection Time: 09/20/23  4:18 PM    Specimen: Blood   Result Value Ref Range    Glucose 120 (H) 70 - 105 mg/dL      Imaging Results (Last 24 Hours)       ** No results found for the last 24 hours. **             Assessment:    Diabetes mellitus with hyperglycemia    Contact  dermatitis    Hypokalemia    Acute UTI (urinary tract infection)      7 mm right proximal ureteral stone  UTI    Plan:    CT images reviewed right proximal ureteral stone  UTI with 25K Pseudomonas has been on appropriate antibiotics with no fevers or leukocytosis  Right ureteroscopy laser lithotripsy and stent placement today  All risks, benefits and alternatives to surgery were discussed with the patient preoperatively including but not limited to need for multiple procedures, infection, sepsis, bleeding, risk of anesthesia and damage to other organs. The details of the procedure have been fully reviewed with the patient and informed consent has been obtained.      Cory Phelps MD  [facility]  [facility], [address]  [place] [address]  Office: [phone]  Available via [employer] Secure Chat  09/20/23  16:20 EDT

## 2023-09-20 NOTE — ANESTHESIA PREPROCEDURE EVALUATION
Anesthesia Evaluation     NPO Solid Status: > 8 hours  NPO Liquid Status: > 8 hours           Airway   Mallampati: II  TM distance: >3 FB  Neck ROM: full  No difficulty expected  Dental - normal exam     Pulmonary - normal exam   Cardiovascular - normal exam    (+) hypertension, dysrhythmias Tachycardia, hyperlipidemia      Neuro/Psych  (+) numbness  GI/Hepatic/Renal/Endo    (+) diabetes mellitus type 2 well controlled    Musculoskeletal     Abdominal  - normal exam    Bowel sounds: normal.   Substance History      OB/GYN          Other   arthritis,       Other Comment: porphyria                Anesthesia Plan    ASA 3     general     intravenous induction     Anesthetic plan, risks, benefits, and alternatives have been provided, discussed and informed consent has been obtained with: patient.  Pre-procedure education provided  Plan discussed with CRNA.    CODE STATUS:    Level Of Support Discussed With: Patient  Code Status (Patient has no pulse and is not breathing): CPR (Attempt to Resuscitate)  Medical Interventions (Patient has pulse or is breathing): Full Support

## 2023-09-20 NOTE — DISCHARGE SUMMARY
Date of Discharge:  9/20/2023    Discharge Diagnosis:   **Diabetes mellitus with hyperglycemia [E11.65]   Kidney stone [N20.0]   Contact dermatitis [L25.9]   Hypokalemia [E87.6]   Acute UTI (urinary tract infection) [N39.0]   Diabetic peripheral neuropathy [E11.42]   Hypertension [I10]   Ovarian cyst    Presenting Problem/History of Present Illness  Active Hospital Problems    Diagnosis  POA    **Diabetes mellitus with hyperglycemia [E11.65]  Yes    Kidney stone [N20.0]  Yes    Contact dermatitis [L25.9]  Yes    Hypokalemia [E87.6]  Yes    Acute UTI (urinary tract infection) [N39.0]  Yes    Diabetic peripheral neuropathy [E11.42]  Yes    Hypertension [I10]  Yes      Resolved Hospital Problems   No resolved problems to display.          Hospital Course  Patient is a 43 y.o. female with type II dm who presented with facial swelling and fever/chills.  She had recent h/o recurrent UTI's and blood sugars that were hard to control.  Diabetes had previously been well controlled.  Facial swelling resolved.  Urine culture grew pseudomonas.  CT scan showed 7 mm obstructing left ureteral stone and a right ovarian cyst (slightly larger than a typical physiologic cyst).  She improved with culture-directed antibiotics and will complete a course of oral Levaquin at discharge.  She had stone removal and ureteral stent placement and will follow up with urology promptly as outpatient.  Her blood sugars improved significantly and were low the morning of discharge.  Blood sugars should continue to improve with resolution of the stone and uti.  She will continue on Lantus, Mounjaro and metformin.  Janumet and sulfa drug have been discontinued.  She will benefit from continuous blood glucose monitor.  At the time of discharge she is tolerating a regular diet, pain is controlled and she is agreeable to discharge.    She will need follow up with gyn and/or pelvic ultrasound to ensure resolution of the ovarian cyst.  She has chronc severe  DJD of lumbar spine and should follow up with her established spine surgeon.    Procedures Performed    Procedure(s):  CYSTOSCOPY URETEROSCOPY STONE BASKET EXTRACTION HOLMIUM LASER STENT INSERTION  -------------------       Consults:   Consults       Date and Time Order Name Status Description    9/19/2023  8:33 AM Inpatient Urology Consult Completed     9/18/2023  9:41 AM Inpatient Endocrinology Consult      9/18/2023  9:23 AM Inpatient Infectious Diseases Consult Completed     9/17/2023 11:23 PM Inpatient Endocrinology Consult              Pertinent Test Results:    Lab Results (most recent)       Procedure Component Value Units Date/Time    STONE ANALYSIS - Calculus, Ureter, Right [[id number]] Collected: 09/20/23 1609    Specimen: Calculus from Ureter, Right Updated: 09/20/23 1817    POC Glucose Once [[id number]]  (Abnormal) Collected: 09/20/23 1618    Specimen: Blood Updated: 09/20/23 1619     Glucose 120 mg/dL      Comment: Serial Number: [id number]:  [id number]       POC Glucose Once [[id number]]  (Abnormal) Collected: 09/20/23 1315    Specimen: Blood Updated: 09/20/23 1317     Glucose 168 mg/dL      Comment: Serial Number: [id number]:  [id number]       Pregnancy, Urine - Urine, Clean Catch [[id number]]  (Normal) Collected: 09/20/23 1221    Specimen: Urine, Clean Catch Updated: 09/20/23 1255     HCG, Urine QL Negative    Magnesium [[id number]]  (Normal) Collected: 09/20/23 0202    Specimen: Blood Updated: 09/20/23 0334     Magnesium 1.9 mg/dL     Basic Metabolic Panel [[id number]]  (Abnormal) Collected: 09/20/23 0202    Specimen: Blood Updated: 09/20/23 0322     Glucose 138 mg/dL      BUN 29 mg/dL      Creatinine 0.62 mg/dL      Sodium 140 mmol/L      Potassium 4.0 mmol/L      Chloride 104 mmol/L      CO2 23.0 mmol/L      Calcium 8.9 mg/dL      BUN/Creatinine Ratio 46.8     Anion Gap 13.0 mmol/L      eGFR 112.1 mL/min/1.73     Narrative:      GFR Normal >60  Chronic Kidney Disease <60  Kidney Failure  <15      CBC & Differential [[id number]]  (Abnormal) Collected: 09/20/23 0202    Specimen: Blood Updated: 09/20/23 0246    Narrative:      The following orders were created for panel order CBC & Differential.  Procedure                               Abnormality         Status                     ---------                               -----------         ------                     CBC Auto Differential[[id number]]        Abnormal            Final result                 Please view results for these tests on the individual orders.    CBC Auto Differential [[id number]]  (Abnormal) Collected: 09/20/23 0202    Specimen: Blood Updated: 09/20/23 0246     WBC 9.80 10*3/mm3      RBC 4.02 10*6/mm3      Hemoglobin 10.8 g/dL      Hematocrit 33.7 %      MCV 83.8 fL      MCH 27.0 pg      MCHC 32.2 g/dL      RDW 15.6 %      RDW-SD 45.9 fl      MPV 8.2 fL      Platelets 363 10*3/mm3      Neutrophil % 56.8 %      Lymphocyte % 33.2 %      Monocyte % 6.9 %      Eosinophil % 1.9 %      Basophil % 1.2 %      Neutrophils, Absolute 5.60 10*3/mm3      Lymphocytes, Absolute 3.30 10*3/mm3      Monocytes, Absolute 0.70 10*3/mm3      Eosinophils, Absolute 0.20 10*3/mm3      Basophils, Absolute 0.10 10*3/mm3      nRBC 0.0 /100 WBC     EBVCA(IgG / M) [[id number]]  (Abnormal) Collected: 09/17/23 2140    Specimen: Blood Updated: 09/19/23 1412     EBV VCA IgM <36.0 U/mL      Comment:                                  Negative        <36.0                                   Equivocal 36.0 - 43.9                                   Positive        >43.9        EBV VCA IgG 90.6 U/mL      Comment:                                  Negative        <18.0                                   Equivocal 18.0 - 21.9                                   Positive        >21.9       Narrative:      Performed at:  [address] [place] [address]  [id number]  : Osmany Man PhD, Phone:  [phone]    Urine Culture - Urine, Urine, Clean Catch  [[id number]]  (Abnormal)  (Susceptibility) Collected: 09/17/23 2140    Specimen: Urine, Clean Catch Updated: 09/19/23 0912     Urine Culture 25,000 CFU/mL Pseudomonas aeruginosa    Narrative:      Colonization of the urinary tract without infection is common. Treatment is discouraged unless the patient is symptomatic, pregnant, or undergoing an invasive urologic procedure.    Susceptibility        Pseudomonas aeruginosa      ROSS      Cefepime Susceptible      Ceftazidime Susceptible      Ciprofloxacin Susceptible      Gentamicin Susceptible      Levofloxacin Susceptible      Piperacillin + Tazobactam Susceptible                           Magnesium [[id number]]  (Abnormal) Collected: 09/19/23 0504    Specimen: Blood Updated: 09/19/23 0615     Magnesium 1.1 mg/dL     Basic Metabolic Panel [[id number]]  (Abnormal) Collected: 09/19/23 0504    Specimen: Blood Updated: 09/19/23 0615     Glucose 100 mg/dL      BUN 28 mg/dL      Creatinine 0.60 mg/dL      Sodium 142 mmol/L      Potassium 3.7 mmol/L      Chloride 105 mmol/L      CO2 27.0 mmol/L      Calcium 9.9 mg/dL      BUN/Creatinine Ratio 46.7     Anion Gap 10.0 mmol/L      eGFR 113.0 mL/min/1.73     Narrative:      GFR Normal >60  Chronic Kidney Disease <60  Kidney Failure <15      CBC & Differential [[id number]]  (Abnormal) Collected: 09/19/23 0504    Specimen: Blood Updated: 09/19/23 0600    Narrative:      The following orders were created for panel order CBC & Differential.  Procedure                               Abnormality         Status                     ---------                               -----------         ------                     CBC Auto Differential[[id number]]        Abnormal            Final result                 Please view results for these tests on the individual orders.    CBC Auto Differential [[id number]]  (Abnormal) Collected: 09/19/23 0504    Specimen: Blood Updated: 09/19/23 0600     WBC 10.40 10*3/mm3      RBC 4.13 10*6/mm3      Hemoglobin  11.1 g/dL      Hematocrit 33.9 %      MCV 82.2 fL      MCH 27.0 pg      MCHC 32.8 g/dL      RDW 15.7 %      RDW-SD 48.1 fl      MPV 8.2 fL      Platelets 389 10*3/mm3      Neutrophil % 56.2 %      Lymphocyte % 35.1 %      Monocyte % 6.2 %      Eosinophil % 1.5 %      Basophil % 1.0 %      Neutrophils, Absolute 5.80 10*3/mm3      Lymphocytes, Absolute 3.70 10*3/mm3      Monocytes, Absolute 0.60 10*3/mm3      Eosinophils, Absolute 0.20 10*3/mm3      Basophils, Absolute 0.10 10*3/mm3      nRBC 0.1 /100 WBC     MILADIS [[id number]]  (Normal) Collected: 09/17/23 2352    Specimen: Blood Updated: 09/18/23 1110     Antinuclear Antibodies (MILADIS) Negative    Rheumatoid Factor [[id number]]  (Normal) Collected: 09/17/23 2352    Specimen: Blood Updated: 09/18/23 1040     Rheumatoid Factor Quantitative <10.0 IU/mL     Comprehensive Metabolic Panel [[id number]]  (Abnormal) Collected: 09/17/23 2140    Specimen: Blood Updated: 09/17/23 2207     Glucose 366 mg/dL      BUN 19 mg/dL      Creatinine 0.76 mg/dL      Sodium 138 mmol/L      Potassium 3.4 mmol/L      Chloride 101 mmol/L      CO2 23.0 mmol/L      Calcium 9.7 mg/dL      Total Protein 7.2 g/dL      Albumin 4.4 g/dL      ALT (SGPT) 11 U/L      AST (SGOT) 11 U/L      Alkaline Phosphatase 66 U/L      Total Bilirubin 0.2 mg/dL      Globulin 2.8 gm/dL      A/G Ratio 1.6 g/dL      BUN/Creatinine Ratio 25.0     Anion Gap 14.0 mmol/L      eGFR 98.6 mL/min/1.73     Narrative:      GFR Normal >60  Chronic Kidney Disease <60  Kidney Failure <15      Hemoglobin A1c [[id number]]  (Abnormal) Collected: 09/17/23 2140    Specimen: Blood Updated: 09/17/23 2202     Hemoglobin A1C 10.10 %     Urinalysis, Microscopic Only - Urine, Clean Catch [[id number]]  (Abnormal) Collected: 09/17/23 2140    Specimen: Urine, Clean Catch Updated: 09/17/23 2154     RBC, UA 6-12 /HPF      WBC, UA 21-30 /HPF      Bacteria, UA None Seen /HPF      Squamous Epithelial Cells, UA 0-2 /HPF      Hyaline Casts, UA None Seen /LPF       Methodology Automated Microscopy    Urinalysis With Culture If Indicated - Urine, Clean Catch [[id number]]  (Abnormal) Collected: 09/17/23 2140    Specimen: Urine, Clean Catch Updated: 09/17/23 2148     Color, UA Yellow     Appearance, UA Clear     pH, UA 7.5     Specific Gravity, UA 1.030     Glucose, UA >=1000 mg/dL (3+)     Ketones, UA Trace     Bilirubin, UA Negative     Blood, UA Trace     Protein, UA Trace     Leuk Esterase, UA Trace     Nitrite, UA Negative     Urobilinogen, UA 1.0 E.U./dL    Narrative:      In absence of clinical symptoms, the presence of pyuria, bacteria, and/or nitrites on the urinalysis result does not correlate with infection.                         Condition on Discharge:  Improved, stable    Vital Signs  Temp:  [97.1 °F (36.2 °C)-98.6 °F (37 °C)] 98.2 °F (36.8 °C)  Heart Rate:  [] 93  Resp:  [12-24] 14  BP: ()/(58-89) 117/80    Physical Exam:     General Appearance:    Alert, cooperative, in no acute distress   Head:    Normocephalic, without obvious abnormality, atraumatic   Eyes:            Lids and lashes normal, conjunctivae and sclerae normal, no   icterus, no pallor, corneas clear, PERRLA   Ears:    Ears appear intact with no abnormalities noted   Throat:   No oral lesions, no thrush, oral mucosa moist   Neck:   No adenopathy, supple, trachea midline, no thyromegaly, no   carotid bruit, no JVD   Lungs:     Clear to auscultation,respirations regular, even and                  unlabored    Heart:    Regular rhythm and normal rate, normal S1 and S2, no            murmur, no gallop, no rub, no click   Chest Wall:    No abnormalities observed   Abdomen:     Normal bowel sounds, no masses, no organomegaly, soft        non-tender, non-distended, no guarding, no rebound                tenderness   Extremities:   Moves all extremities well, no edema, no cyanosis, no             redness   Pulses:   Pulses palpable and equal bilaterally   Skin:   No bleeding, bruising or  rash   Lymph nodes:   No palpable adenopathy   Neurologic:   Cranial nerves 2 - 12 grossly intact, sensation intact, DTR       present and equal bilaterally       Discharge Disposition  Home or Self Care    Discharge Medications     Discharge Medications        New Medications        Instructions Start Date   acetaminophen 325 MG tablet  Commonly known as: TYLENOL   650 mg, Oral, Every 4 Hours PRN      levoFLOXacin 750 MG tablet  Commonly known as: Levaquin   750 mg, Oral, Daily   Start Date: September 21, 2023     metFORMIN 1000 MG tablet  Commonly known as: GLUCOPHAGE   1,000 mg, Oral, 2 Times Daily With Meals   Start Date: September 21, 2023            Continue These Medications        Instructions Start Date   cyclobenzaprine 10 MG tablet  Commonly known as: FLEXERIL   No dose, route, or frequency recorded.      gabapentin 100 MG capsule  Commonly known as: NEURONTIN   3 Times Daily      insulin glargine 100 UNIT/ML injection  Commonly known as: LANTUS, SEMGLEE   10 Units, Subcutaneous, Nightly      methylphenidate 27 MG CR tablet   54 mg, Oral, Every Morning      Mounjaro 5 MG/0.5ML solution pen-injector  Generic drug: Tirzepatide   Subcutaneous, Weekly      nabumetone 750 MG tablet  Commonly known as: RELAFEN   No dose, route, or frequency recorded.      naproxen sodium 220 MG tablet  Commonly known as: ALEVE   220 mg, Oral, 2 Times Daily PRN      rosuvastatin 20 MG tablet  Commonly known as: CRESTOR   20 mg, Oral, Daily      Trokendi  MG capsule sustained-release 24 hr  Generic drug: Topiramate ER   No dose, route, or frequency recorded.             Stop These Medications      albuterol sulfate  (90 Base) MCG/ACT inhaler  Commonly known as: PROVENTIL HFA;VENTOLIN HFA;PROAIR HFA     calcium citrate 950 (200 Ca) MG tablet  Commonly known as: CALCITRATE     Cholecalciferol 50 MCG (2000 UT) tablet     Dulaglutide 0.75 MG/0.5ML solution pen-injector     FLAX SEED OIL PO     Janumet  MG per  tablet  Generic drug: sitaGLIPtin-metFORMIN     lisinopril 5 MG tablet  Commonly known as: PRINIVIL,ZESTRIL     prenatal vitamin  tablet     PROBIOTIC PRODUCT PO     Prucalopride Succinate 2 MG tablet     simvastatin 10 MG tablet  Commonly known as: ZOCOR     vitamin C 500 MG tablet  Commonly known as: ASCORBIC ACID              Discharge Diet: Low concentrated sweets    Activity at Discharge:     Follow-up Appointments  No future appointments.  Additional Instructions for the Follow-ups that You Need to Schedule       Discharge Follow-up with PCP   As directed       Currently Documented PCP:    Akash Weiner MD    PCP Phone Number:    [phone]     Follow Up Details: October 2 at 2 pm        Discharge Follow-up with Specified Provider: Dr. Phelps -  Urology 1-2 weeks.   As directed      To: Dr. Phelps -  Urology 1-2 weeks.                Test Results Pending at Discharge  Pending Labs       Order Current Status    STONE ANALYSIS - Calculus, Ureter, Right In process             Micaela Isabel MD  09/20/23  18:28 EDT    Time: Discharge 25 min

## 2023-09-20 NOTE — ANESTHESIA PROCEDURE NOTES
Airway  Urgency: elective    Date/Time: 9/20/2023 3:48 PM  Airway not difficult    General Information and Staff    Patient location during procedure: OR    Indications and Patient Condition  Indications for airway management: airway protection    Preoxygenated: yes  Mask difficulty assessment: 0 - not attempted    Final Airway Details  Final airway type: supraglottic airway      Successful airway: I-gel  Size 4     Number of attempts at approach: 1  Assessment: lips, teeth, and gum same as pre-op and atraumatic intubation

## 2023-09-20 NOTE — PROGRESS NOTES
Daily Progress Note    Patient Care Team:  Akash Weiner MD as PCP - General (Family Medicine)    Chief Complaint: Follow-up type 2 diabetes    HPI: Patient seen, blood sugar log reviewed.  Did have low blood sugar today.  She tells me she was n.p.o. but did get her insulin.    ROS:   Constitutional:  Denies fatigue, tiredness.    Respiratory: denies cough, shortness of breath.   Cardiovascular:  denies chest pain, edema   GI:  Denies abdominal pain, nausea, vomiting.         Vitals:    09/20/23 1654   BP: 117/80   Pulse: 93   Resp: 14   Temp: 98.2 °F (36.8 °C)   SpO2: 99%     Body mass index is 23.17 kg/m².    Physical Exam:  GEN: NAD, conversant  PSYCH: Awake and coherent      Results Review:     I reviewed the patient's new clinical results.    Glucose   Date Value Ref Range Status   09/20/2023 138 (H) 65 - 99 mg/dL Final     Sodium   Date Value Ref Range Status   09/20/2023 140 136 - 145 mmol/L Final     Potassium   Date Value Ref Range Status   09/20/2023 4.0 3.5 - 5.2 mmol/L Final     CO2   Date Value Ref Range Status   09/20/2023 23.0 22.0 - 29.0 mmol/L Final     Chloride   Date Value Ref Range Status   09/20/2023 104 98 - 107 mmol/L Final     Anion Gap   Date Value Ref Range Status   09/20/2023 13.0 5.0 - 15.0 mmol/L Final     Creatinine   Date Value Ref Range Status   09/20/2023 0.62 0.57 - 1.00 mg/dL Final     BUN   Date Value Ref Range Status   09/20/2023 29 (H) 6 - 20 mg/dL Final     BUN/Creatinine Ratio   Date Value Ref Range Status   09/20/2023 46.8 (H) 7.0 - 25.0 Final     Calcium   Date Value Ref Range Status   09/20/2023 8.9 8.6 - 10.5 mg/dL Final     Alkaline Phosphatase   Date Value Ref Range Status   09/17/2023 66 39 - 117 U/L Final     Total Protein   Date Value Ref Range Status   09/17/2023 7.2 6.0 - 8.5 g/dL Final     ALT (SGPT)   Date Value Ref Range Status   09/17/2023 11 1 - 33 U/L Final     AST (SGOT)   Date Value Ref Range Status   09/17/2023 11 1 - 32 U/L Final     Total  Bilirubin   Date Value Ref Range Status   09/17/2023 0.2 0.0 - 1.2 mg/dL Final     Albumin   Date Value Ref Range Status   09/17/2023 4.4 3.5 - 5.2 g/dL Final     Globulin   Date Value Ref Range Status   09/17/2023 2.8 gm/dL Final     Magnesium   Date Value Ref Range Status   09/20/2023 1.9 1.6 - 2.6 mg/dL Final     Lab Results   Component Value Date    HGBA1C 10.10 (H) 09/17/2023    HGBA1C 6.2 (H) 12/06/2018     No results found for: GLUF, MICROALBUR  Results from last 7 days   Lab Units 09/20/23  1618 09/20/23  1315 09/20/23  1157 09/20/23  1156 09/20/23  1130 09/20/23  1109   GLUCOSE mg/dL 120* 168* 143* 164* 46* 53*             Medication Review: Reviewed.     cyclobenzaprine, 10 mg, Oral, Nightly  gabapentin, 100 mg, Oral, TID  insulin glargine, 25 Units, Subcutaneous, Daily  insulin lispro, 2-12 Units, Subcutaneous, TID With Meals  insulin lispro, 8 Units, Subcutaneous, TID With Meals  levoFLOXacin, 750 mg, Intravenous, Q24H  metFORMIN, 1,000 mg, Oral, BID With Meals  naproxen, 250 mg, Oral, BID With Meals  rosuvastatin, 20 mg, Oral, Nightly  senna-docusate sodium, 2 tablet, Oral, BID  sodium chloride, 10 mL, Intravenous, Q12H              Assessment and plan:  Diabetes mellitus type 2 with hyperglycemia: Blood sugars running on the low side, will reduce glargine to 20 units subcu daily and lispro to 6 units with each meal and continue metformin and continue to follow blood sugars and adjust insulin dose as needed.    Hyperlipidemia: Currently on rosuvastatin.    Alyssa Razo MD. FACE

## 2023-09-20 NOTE — PROGRESS NOTES
LOS: 0 days   Patient Care Team:  Akash Weiner MD as PCP - General (Family Medicine)        Subjective     Interval History:     Patient Complaints:   Patient Denies:  NV       Review of Systems:    weak    Objective     Vital Signs  Temp:  [98 °F (36.7 °C)-98.6 °F (37 °C)] 98.6 °F (37 °C)  Heart Rate:  [] 94  Resp:  [13-24] 24  BP: ()/(58-84) 113/72    Physical Exam:     General Appearance:    Alert, cooperative, in no acute distress   Head:    Normocephalic, without obvious abnormality, atraumatic   Eyes:            Lids and lashes normal, conjunctivae and sclerae normal, no   icterus, no pallor, corneas clear, PERRLA   Ears:    Ears appear intact with no abnormalities noted   Throat:   No oral lesions, no thrush, oral mucosa moist   Neck:   No adenopathy, supple, trachea midline, no thyromegaly, no     carotid bruit, no JVD   Back:     No kyphosis present, no scoliosis present, no skin lesions,       erythema or scars, no tenderness to percussion or                   palpation,   range of motion normal   Lungs:     Clear to auscultation,respirations regular, even and                   unlabored    Heart:    Regular rhythm and normal rate, normal S1 and S2, no            murmur, no gallop, no rub, no click   Breast Exam:    Deferred   Abdomen:     Normal bowel sounds, no masses, no organomegaly, soft        non-tender, non-distended, no guarding, no rebound                 tenderness   Genitalia:    Deferred   Extremities:   Moves all extremities well, no edema, no cyanosis, no              redness   Pulses:   Pulses palpable and equal bilaterally   Skin:   No bleeding, bruising or rash   Lymph nodes:   No palpable adenopathy   Neurologic:   Cranial nerves 2 - 12 grossly intact, sensation intact, DTR        present and equal bilaterally          Results Review:      Lab Results (last 72 hours)       Procedure Component Value Units Date/Time    EBVCA(IgG / M) [[id number]]  (Abnormal) Collected:  09/17/23 2140    Specimen: Blood Updated: 09/19/23 1412     EBV VCA IgM <36.0 U/mL      Comment:                                  Negative        <36.0                                   Equivocal 36.0 - 43.9                                   Positive        >43.9        EBV VCA IgG 90.6 U/mL      Comment:                                  Negative        <18.0                                   Equivocal 18.0 - 21.9                                   Positive        >21.9       Narrative:      Performed at:  [address] [place] [address]  [id number]  : Osmany Man PhD, Phone:  [phone]    POC Glucose Once [[id number]]  (Normal) Collected: 09/19/23 1135    Specimen: Blood Updated: 09/19/23 1138     Glucose 83 mg/dL      Comment: Serial Number: [id number]:  [id number]       Urine Culture - Urine, Urine, Clean Catch [[id number]]  (Abnormal)  (Susceptibility) Collected: 09/17/23 2140    Specimen: Urine, Clean Catch Updated: 09/19/23 0912     Urine Culture 25,000 CFU/mL Pseudomonas aeruginosa    Narrative:      Colonization of the urinary tract without infection is common. Treatment is discouraged unless the patient is symptomatic, pregnant, or undergoing an invasive urologic procedure.    Susceptibility        Pseudomonas aeruginosa      ROSS      Cefepime Susceptible      Ceftazidime Susceptible      Ciprofloxacin Susceptible      Gentamicin Susceptible      Levofloxacin Susceptible      Piperacillin + Tazobactam Susceptible                           POC Glucose Once [[id number]]  (Abnormal) Collected: 09/19/23 0726    Specimen: Blood Updated: 09/19/23 0727     Glucose 131 mg/dL      Comment: Serial Number: [id number]:  [id number]       Magnesium [[id number]]  (Abnormal) Collected: 09/19/23 0504    Specimen: Blood Updated: 09/19/23 0615     Magnesium 1.1 mg/dL     Basic Metabolic Panel [[id number]]  (Abnormal) Collected: 09/19/23 0504    Specimen: Blood Updated: 09/19/23 0615      Glucose 100 mg/dL      BUN 28 mg/dL      Creatinine 0.60 mg/dL      Sodium 142 mmol/L      Potassium 3.7 mmol/L      Chloride 105 mmol/L      CO2 27.0 mmol/L      Calcium 9.9 mg/dL      BUN/Creatinine Ratio 46.7     Anion Gap 10.0 mmol/L      eGFR 113.0 mL/min/1.73     Narrative:      GFR Normal >60  Chronic Kidney Disease <60  Kidney Failure <15      CBC & Differential [[id number]]  (Abnormal) Collected: 09/19/23 0504    Specimen: Blood Updated: 09/19/23 0600    Narrative:      The following orders were created for panel order CBC & Differential.  Procedure                               Abnormality         Status                     ---------                               -----------         ------                     CBC Auto Differential[[id number]]        Abnormal            Final result                 Please view results for these tests on the individual orders.    CBC Auto Differential [[id number]]  (Abnormal) Collected: 09/19/23 0504    Specimen: Blood Updated: 09/19/23 0600     WBC 10.40 10*3/mm3      RBC 4.13 10*6/mm3      Hemoglobin 11.1 g/dL      Hematocrit 33.9 %      MCV 82.2 fL      MCH 27.0 pg      MCHC 32.8 g/dL      RDW 15.7 %      RDW-SD 48.1 fl      MPV 8.2 fL      Platelets 389 10*3/mm3      Neutrophil % 56.2 %      Lymphocyte % 35.1 %      Monocyte % 6.2 %      Eosinophil % 1.5 %      Basophil % 1.0 %      Neutrophils, Absolute 5.80 10*3/mm3      Lymphocytes, Absolute 3.70 10*3/mm3      Monocytes, Absolute 0.60 10*3/mm3      Eosinophils, Absolute 0.20 10*3/mm3      Basophils, Absolute 0.10 10*3/mm3      nRBC 0.1 /100 WBC     POC Glucose Once [[id number]]  (Normal) Collected: 09/18/23 2140    Specimen: Blood Updated: 09/18/23 2142     Glucose 93 mg/dL      Comment: Serial Number: [id number]:  [id number]       POC Glucose Once [[id number]]  (Abnormal) Collected: 09/18/23 1638    Specimen: Blood Updated: 09/18/23 1641     Glucose 163 mg/dL      Comment: Serial Number: [id number]:   [id number]       MILADIS [[id number]]  (Normal) Collected: 09/17/23 2352    Specimen: Blood Updated: 09/18/23 1110     Antinuclear Antibodies (MILADIS) Negative    POC Glucose Once [[id number]]  (Abnormal) Collected: 09/18/23 1107    Specimen: Blood Updated: 09/18/23 1108     Glucose 275 mg/dL      Comment: Serial Number: [id number]:  [id number]       Rheumatoid Factor [[id number]]  (Normal) Collected: 09/17/23 2352    Specimen: Blood Updated: 09/18/23 1040     Rheumatoid Factor Quantitative <10.0 IU/mL     POC Glucose Once [[id number]]  (Abnormal) Collected: 09/18/23 0744    Specimen: Blood Updated: 09/18/23 0746     Glucose 315 mg/dL      Comment: Serial Number: [id number]:  [id number]       Basic Metabolic Panel [[id number]]  (Abnormal) Collected: 09/18/23 0104    Specimen: Blood Updated: 09/18/23 0158     Glucose 201 mg/dL      BUN 21 mg/dL      Creatinine 0.67 mg/dL      Sodium 143 mmol/L      Potassium 3.7 mmol/L      Chloride 106 mmol/L      CO2 24.0 mmol/L      Calcium 9.0 mg/dL      BUN/Creatinine Ratio 31.3     Anion Gap 13.0 mmol/L      eGFR 110.0 mL/min/1.73     Narrative:      GFR Normal >60  Chronic Kidney Disease <60  Kidney Failure <15      CBC & Differential [[id number]]  (Abnormal) Collected: 09/18/23 0104    Specimen: Blood Updated: 09/18/23 0139    Narrative:      The following orders were created for panel order CBC & Differential.  Procedure                               Abnormality         Status                     ---------                               -----------         ------                     CBC Auto Differential[[id number]]        Abnormal            Final result                 Please view results for these tests on the individual orders.    CBC Auto Differential [[id number]]  (Abnormal) Collected: 09/18/23 0104    Specimen: Blood Updated: 09/18/23 0139     WBC 13.00 10*3/mm3      RBC 4.16 10*6/mm3      Hemoglobin 11.0 g/dL      Hematocrit 34.1 %      MCV 82.1 fL      MCH 26.5 pg      MCHC  32.3 g/dL      RDW 14.8 %      RDW-SD 45.1 fl      MPV 8.1 fL      Platelets 545 10*3/mm3      Neutrophil % 84.4 %      Lymphocyte % 11.9 %      Monocyte % 2.6 %      Eosinophil % 0.2 %      Basophil % 0.9 %      Neutrophils, Absolute 10.90 10*3/mm3      Lymphocytes, Absolute 1.50 10*3/mm3      Monocytes, Absolute 0.30 10*3/mm3      Eosinophils, Absolute 0.00 10*3/mm3      Basophils, Absolute 0.10 10*3/mm3      nRBC 0.1 /100 WBC     Comprehensive Metabolic Panel [[id number]]  (Abnormal) Collected: 09/17/23 2140    Specimen: Blood Updated: 09/17/23 2207     Glucose 366 mg/dL      BUN 19 mg/dL      Creatinine 0.76 mg/dL      Sodium 138 mmol/L      Potassium 3.4 mmol/L      Chloride 101 mmol/L      CO2 23.0 mmol/L      Calcium 9.7 mg/dL      Total Protein 7.2 g/dL      Albumin 4.4 g/dL      ALT (SGPT) 11 U/L      AST (SGOT) 11 U/L      Alkaline Phosphatase 66 U/L      Total Bilirubin 0.2 mg/dL      Globulin 2.8 gm/dL      A/G Ratio 1.6 g/dL      BUN/Creatinine Ratio 25.0     Anion Gap 14.0 mmol/L      eGFR 98.6 mL/min/1.73     Narrative:      GFR Normal >60  Chronic Kidney Disease <60  Kidney Failure <15      Hemoglobin A1c [[id number]]  (Abnormal) Collected: 09/17/23 2140    Specimen: Blood Updated: 09/17/23 2202     Hemoglobin A1C 10.10 %     Urinalysis, Microscopic Only - Urine, Clean Catch [[id number]]  (Abnormal) Collected: 09/17/23 2140    Specimen: Urine, Clean Catch Updated: 09/17/23 2154     RBC, UA 6-12 /HPF      WBC, UA 21-30 /HPF      Bacteria, UA None Seen /HPF      Squamous Epithelial Cells, UA 0-2 /HPF      Hyaline Casts, UA None Seen /LPF      Methodology Automated Microscopy    Urinalysis With Culture If Indicated - Urine, Clean Catch [[id number]]  (Abnormal) Collected: 09/17/23 2140    Specimen: Urine, Clean Catch Updated: 09/17/23 2148     Color, UA Yellow     Appearance, UA Clear     pH, UA 7.5     Specific Gravity, UA 1.030     Glucose, UA >=1000 mg/dL (3+)     Ketones, UA Trace     Bilirubin, UA  Negative     Blood, UA Trace     Protein, UA Trace     Leuk Esterase, UA Trace     Nitrite, UA Negative     Urobilinogen, UA 1.0 E.U./dL    Narrative:      In absence of clinical symptoms, the presence of pyuria, bacteria, and/or nitrites on the urinalysis result does not correlate with infection.    CBC & Differential [[id number]]  (Abnormal) Collected: 09/17/23 2140    Specimen: Blood Updated: 09/17/23 2148    Narrative:      The following orders were created for panel order CBC & Differential.  Procedure                               Abnormality         Status                     ---------                               -----------         ------                     CBC Auto Differential[[id number]]        Abnormal            Final result                 Please view results for these tests on the individual orders.    CBC Auto Differential [[id number]]  (Abnormal) Collected: 09/17/23 2140    Specimen: Blood Updated: 09/17/23 2148     WBC 11.50 10*3/mm3      RBC 4.42 10*6/mm3      Hemoglobin 11.8 g/dL      Hematocrit 36.2 %      MCV 81.9 fL      MCH 26.7 pg      MCHC 32.5 g/dL      RDW 15.5 %      RDW-SD 47.7 fl      MPV 7.8 fL      Platelets 553 10*3/mm3      Neutrophil % 88.2 %      Lymphocyte % 7.1 %      Monocyte % 3.8 %      Eosinophil % 0.1 %      Basophil % 0.8 %      Neutrophils, Absolute 10.20 10*3/mm3      Lymphocytes, Absolute 0.80 10*3/mm3      Monocytes, Absolute 0.40 10*3/mm3      Eosinophils, Absolute 0.00 10*3/mm3      Basophils, Absolute 0.10 10*3/mm3      nRBC 0.0 /100 WBC             Imaging Results (Last 72 Hours)       Procedure Component Value Units Date/Time    CT Abdomen Pelvis Stone Protocol [[id number]] Collected: 09/18/23 1018     Updated: 09/18/23 1027    Narrative:      CT ABDOMEN PELVIS STONE PROTOCOL    Date of Exam: 9/18/2023 10:06 AM EDT    Indication: Recurrent urinary tract infection..    Comparison: None available.    Technique: Axial CT images were obtained of the abdomen and pelvis  without the administration of contrast. Sagittal and coronal reconstructions were performed.  Automated exposure control and iterative reconstruction methods were used.      Findings:  There is mild to moderate asymmetric right hemidiaphragm elevation. Lung bases are clear. Heart size is within normal limits.    No focal liver lesions identified. The liver does not appear grossly cirrhotic or steatotic.    Multiple gallstones are present. No evidence of cholecystitis or choledocholithiasis or abnormal biliary dilation.    Benign calcified granulomatous changes are seen within the spleen. Pancreas, adrenals, are normal. Nonobstructing stone in the left mid kidney measures 4 mm. A stone in the left lower renal pole measures 2 mm.    A right ureteropelvic junction stone measures 7 x 5 mm, with mild right hydronephrosis. Urinary bladder, uterus and rectum are normal. Hysterectomy changes are present. Right ovarian cyst or cystic lesion measures 4.0 x 3.5 cm. No pelvic free fluid is   identified.    Bulky posterior disc osteophytes are present at L2-3, L4-5 and L5-S1. Suspected severe canal stenosis at the L1-2 level..         Impression:      Impression:    1. 7 mm obstructing right ureteropelvic junction stone with mild right hydronephrosis.  2. Nonobstructing left renal stones.  3. 4 mm right ovarian cyst or cystic lesion. This is slightly larger than typically seen for physiologic cyst. Consider pelvic ultrasound follow-up in 6 weeks or at a different phase of the menstrual cycle to ensure improvement or resolution.      Electronically Signed: Melodie Jones MD    9/18/2023 10:25 AM EDT    Workstation ID: [id number]              Medication Review:     Hospital Medications (active)         Dose Frequency Start End    acetaminophen (TYLENOL) tablet 650 mg 650 mg Every 4 Hours PRN 9/18/2023     Admin Instructions: If given for fever, use fever parameter: fever greater than 100.4 °F  Based on patient request - if ordered  "for moderate or severe pain, provider allows for administration of a medication prescribed for a lower pain scale.    Do not exceed 4 grams of acetaminophen in a 24 hr period. Max dose of 2gm for AST/ALT greater than 120 units/L.    If given for pain, use the following pain scale:   Mild Pain = Pain Score of 1-3, CPOT 1-2  Moderate Pain = Pain Score of 4-6, CPOT 3-4  Severe Pain = Pain Score of 7-10, CPOT 5-8    Route: Oral    bisacodyl (DULCOLAX) EC tablet 5 mg 5 mg Daily PRN 9/18/2023     Admin Instructions: Use if no bowel movement after 12 hours.  Swallow whole. Do not crush, split, or chew tablet.    Route: Oral    Linked Group 1: See Hyperspace for full Linked Orders Report.        bisacodyl (DULCOLAX) suppository 10 mg 10 mg Daily PRN 9/18/2023     Admin Instructions: Use if no bowel movement after 12 hours.  Hold for diarrhea    Route: Rectal    Linked Group 1: See Hyperspace for full Linked Orders Report.        Calcium Replacement - Follow Nurse / BPA Driven Protocol  As Needed 9/18/2023     Admin Instructions: Open Order & Select \"[facility] Electrolyte Replacement Protocol Algorithm\" to View Details    Route: Does not apply    cyclobenzaprine (FLEXERIL) tablet 10 mg 10 mg Nightly 9/18/2023     Route: Oral    dextrose (D50W) (25 g/50 mL) IV injection 25 g 25 g Every 15 Minutes PRN 9/18/2023     Admin Instructions: Blood sugar less than 70; patient has IV access - Unresponsive, NPO or Unable To Safely Swallow    Route: Intravenous    dextrose (GLUTOSE) oral gel 15 g 15 g Every 15 Minutes PRN 9/18/2023     Admin Instructions: BS<70, Patient Alert, Is not NPO, Can safely swallow.    Route: Oral    diphenhydrAMINE (BENADRYL) injection 25 mg 25 mg Every 6 Hours PRN 9/18/2023     Admin Instructions: 25 mg may be given IV push over less than 1 minute.  Caution: Look alike/sound alike drug alert. This med may be ordered in other forms and routes. Before giving verify the last time the drug was given by any route/form.   " "   Route: Intravenous    gabapentin (NEURONTIN) capsule 100 mg 100 mg 3 Times Daily 9/18/2023     Admin Instructions:     Route: Oral    glucagon (GLUCAGEN) injection 1 mg 1 mg Every 15 Minutes PRN 9/18/2023     Admin Instructions: Blood Glucose Less Than 70 - Patient Without IV Access - Unresponsive, NPO or Unable To Safely Swallow  Reconstitute powder for injection by adding 1 mL of -supplied sterile diluent or sterile water for injection to a vial containing 1 mg of the drug, to provide solutions containing 1 mg/mL. Shake vial gently to dissolve.    Route: Intramuscular    insulin glargine (LANTUS, SEMGLEE) injection 25 Units 25 Units Daily 9/18/2023     Admin Instructions: Do not hold basal insulin without an order. Consider requesting a dose edit, if needed.       Route: Subcutaneous    insulin lispro (HUMALOG/ADMELOG) injection 2-12 Units 2-12 Units 3 Times Daily With Meals 9/18/2023     Admin Instructions: Correction Insulin - Moderate-High Dose (Total Insulin Dose 60-80 units/day, Patient Taking Insulin at Home)    Blood Glucose 150-199 mg/dL - 2 units  Blood Glucose 200-249 mg/dL - 4 units  Blood Glucose 250-299 mg/dL - 6 units  Blood Glucose 300-349 mg/dL - 8 units  Blood Glucose 350-400 mg/dL - 10 units  Blood Glucose Greater Than 400 mg/dL - 12 units & Call Provider   Caution: Look alike/sound alike drug alert    Route: Subcutaneous    insulin lispro (HUMALOG/ADMELOG) injection 8 Units 8 Units 3 Times Daily With Meals 9/18/2023     Admin Instructions: \"Solution should be clear. If cloudy, contact pharmacy for a new vial.\"   Caution: Look alike/sound alike drug alert    Route: Subcutaneous    levoFLOXacin (LEVAQUIN) 750 mg/150 mL D5W (premix) (LEVAQUIN) 750 mg 750 mg Every 24 Hours 9/18/2023 9/23/2023    Admin Instructions: Caution: Look alike/sound alike drug alert. Protect from light. Do NOT refrigerate.    Route: Intravenous    Magnesium Standard Dose Replacement - Follow Nurse / BPA Driven " "Protocol  As Needed 9/18/2023     Admin Instructions: Open Order & Select \"[facility] Electrolyte Replacement Protocol Algorithm\" to View Details    Route: Does not apply    magnesium sulfate 2g/50 mL (PREMIX) infusion 2 g Every 2 Hours 9/19/2023 9/19/2023    Route: Intravenous    metFORMIN (GLUCOPHAGE) tablet 1,000 mg 1,000 mg 2 Times Daily With Meals 9/18/2023     Admin Instructions: Caution: Look alike/sound alike drug alert    Route: Oral    naproxen (NAPROSYN) tablet 250 mg 250 mg 2 Times Daily With Meals 9/18/2023     Admin Instructions: Take with food. Based on patient request - if ordered for moderate or severe pain, provider allows for administration of a medication prescribed for a lower pain scale.  If given for pain, use the following pain scale:  Mild Pain = Pain Score of 1-3, CPOT 1-2  Moderate Pain = Pain Score of 4-6, CPOT 3-4  Severe Pain = Pain Score of 7-10, CPOT 5-8    Route: Oral    nitroglycerin (NITROSTAT) SL tablet 0.4 mg 0.4 mg Every 5 Minutes PRN 9/18/2023     Admin Instructions: If Pain Unrelieved After 3 Doses Notify MD  May administer up to 3 doses per episode.    Route: Sublingual    ondansetron (ZOFRAN) injection 4 mg 4 mg Every 6 Hours PRN 9/18/2023     Admin Instructions: If BOTH ondansetron (ZOFRAN) and promethazine (PHENERGAN) are ordered use ondansetron first and THEN promethazine IF ondansetron is ineffective.    Route: Intravenous    Phosphorus Replacement - Follow Nurse / BPA Driven Protocol  As Needed 9/18/2023     Admin Instructions: Open Order & Select \"[facility] Electrolyte Replacement Protocol Algorithm\" to View Details    Route: Does not apply    polyethylene glycol (MIRALAX) packet 17 g 17 g Daily PRN 9/18/2023     Admin Instructions: Use if no bowel movement after 12 hours. Mix in 6-8 ounces of water.  Use 4-8 ounces of water, tea, or juice for each 17 gram dose.    Route: Oral    Linked Group 1: See Marvin for full Linked Orders Report.        Potassium Replacement - Follow " "Nurse / BPA Driven Protocol  As Needed 9/18/2023     Admin Instructions: Open Order & Select \"[facility] Electrolyte Replacement Protocol Algorithm\" to View Details    Route: Does not apply    rosuvastatin (CRESTOR) tablet 20 mg 20 mg Nightly 9/18/2023     Admin Instructions: Avoid grapefruit juice.    Route: Oral    sennosides-docusate (PERICOLACE) 8.6-50 MG per tablet 2 tablet 2 tablet 2 Times Daily 9/18/2023     Admin Instructions: HOLD MEDICATION IF PATIENT HAS HAD BOWEL MOVEMENT. Start bowel management regimen if patient has not had a bowel movement after 12 hours.    Route: Oral    Linked Group 1: See Marvin for full Linked Orders Report.        sodium chloride 0.9 % flush 10 mL 10 mL Every 12 Hours Scheduled 9/18/2023     Route: Intravenous    sodium chloride 0.9 % flush 10 mL 10 mL As Needed 9/18/2023     Route: Intravenous    sodium chloride 0.9 % infusion 40 mL 40 mL As Needed 9/18/2023     Admin Instructions: Following administration of an IV intermittent medication, flush line with 40mL NS at 100mL/hr.    Route: Intravenous          [MAR Hold] cyclobenzaprine, 10 mg, Oral, Nightly  gabapentin, 100 mg, Oral, TID  [MAR Hold] insulin glargine, 25 Units, Subcutaneous, Daily  [MAR Hold] insulin lispro, 2-12 Units, Subcutaneous, TID With Meals  [MAR Hold] insulin lispro, 8 Units, Subcutaneous, TID With Meals  levoFLOXacin, 750 mg, Intravenous, Q24H  [MAR Hold] metFORMIN, 1,000 mg, Oral, BID With Meals  [MAR Hold] naproxen, 250 mg, Oral, BID With Meals  [MAR Hold] rosuvastatin, 20 mg, Oral, Nightly  [MAR Hold] senna-docusate sodium, 2 tablet, Oral, BID  [MAR Hold] sodium chloride, 10 mL, Intravenous, Q12H        Assessment & Plan         Diabetes mellitus with hyperglycemia    Contact dermatitis    Hypokalemia    Acute UTI (urinary tract infection)     Diabetes mellitus with hyperglycemia    Contact dermatitis    Hypokalemia    Acute UTI (urinary tract infection)   C&S Pseudomonas aeruginosa  Allergic reaction " Keflex    cystoscopy with right ureteroscopic stone extraction   Plan :    IV levaquine or PO for 14 days    Will follow  Thank you              Ryan Patrick MD  09/20/23  13:47 EDT

## 2023-09-20 NOTE — PLAN OF CARE
Problem: Adult Inpatient Plan of Care  Goal: Plan of Care Review  9/20/2023 1635 by Jessica Lawler LPN  Outcome: Ongoing, Progressing  9/20/2023 1635 by Jessica Lawler LPN  Outcome: Ongoing, Progressing  Goal: Patient-Specific Goal (Individualized)  9/20/2023 1635 by Jessica Lawler LPN  Outcome: Ongoing, Progressing  9/20/2023 1635 by Jessica Lawler LPN  Outcome: Ongoing, Progressing  Goal: Absence of Hospital-Acquired Illness or Injury  9/20/2023 1635 by Jessica Lawler LPN  Outcome: Ongoing, Progressing  9/20/2023 1635 by Jessica Lawler LPN  Outcome: Ongoing, Progressing  Intervention: Identify and Manage Fall Risk  Recent Flowsheet Documentation  Taken 9/20/2023 1634 by Jessica Lawler LPN  Safety Promotion/Fall Prevention: patient off unit  Taken 9/20/2023 1400 by Jessica Lawler LPN  Safety Promotion/Fall Prevention: patient off unit  Taken 9/20/2023 1230 by Jessica Lawler LPN  Safety Promotion/Fall Prevention: patient off unit  Taken 9/20/2023 1000 by Jessica Lawler LPN  Safety Promotion/Fall Prevention: activity supervised  Taken 9/20/2023 0836 by Jessica Lawler LPN  Safety Promotion/Fall Prevention: safety round/check completed  Intervention: Prevent Skin Injury  Recent Flowsheet Documentation  Taken 9/20/2023 0836 by Jessica Lawler LPN  Body Position: position changed independently  Taken 9/20/2023 0726 by Jessica Lawler LPN  Skin Protection:   adhesive use limited   pulse oximeter probe site changed   tubing/devices free from skin contact  Intervention: Prevent and Manage VTE (Venous Thromboembolism) Risk  Recent Flowsheet Documentation  Taken 9/20/2023 0857 by Jessica Lawler LPN  VTE Prevention/Management: (up adlib)   compression stockings off   sequential compression devices off  Taken 9/20/2023 0726 by Jessica Lawler LPN  Range of Motion: active ROM (range of motion) encouraged  Goal: Optimal Comfort and Wellbeing  9/20/2023 1635 by Jessica Lawler LPN  Outcome: Ongoing, Progressing  9/20/2023 1635 by  Bucky, Lopez, LPN  Outcome: Ongoing, Progressing  Intervention: Provide Person-Centered Care  Recent Flowsheet Documentation  Taken 9/20/2023 0857 by Jessica Lawler LPN  Trust Relationship/Rapport:   care explained   choices provided   emotional support provided   empathic listening provided   questions answered   questions encouraged  Taken 9/20/2023 0726 by Jessica Lawler LPN  Trust Relationship/Rapport:   care explained   choices provided   emotional support provided  Goal: Readiness for Transition of Care  9/20/2023 1635 by Jessica Lawler LPN  Outcome: Ongoing, Progressing  9/20/2023 1635 by Jessica Lawler LPN  Outcome: Ongoing, Progressing     Problem: Hyperglycemia  Goal: Blood Glucose Level Within Targeted Range  9/20/2023 1635 by Jessica Lawler LPN  Outcome: Ongoing, Progressing  9/20/2023 1635 by Jessica Lawler LPN  Outcome: Ongoing, Progressing  Intervention: Optimize Glycemic Control  Recent Flowsheet Documentation  Taken 9/20/2023 0726 by Jessica Lawler LPN  Glycemic Management: blood glucose monitored   Goal Outcome Evaluation:      Patient possibly discharging today after procedure. Patient off unit at this time

## 2023-09-20 NOTE — PRE-PROCEDURE NOTE
Patient was given oral glucose gel and Metformin 1000mg at 0851 when patient was NPO for surgery.  Dr. Grullon, anesthesia, notified, states okay for surgery.

## 2023-09-20 NOTE — OP NOTE
Urology Operative Note    9/20/2023    Nora Tam  43 y.o.  1980  female  [id number]      Surgeon(s) and Role:  Cory Phelps MD - Primary     Pre-operative Diagnosis: 7 mm right UPJ stone, small left renal stones    Post-operative Diagnosis: Same    Complications: None    Procedures:  Cystoscopy  Right ureteroscopy  Laser Lithotripsy  Basket-extraction of stone fragments  Stent placement  Fluoroscopy with Interpretation     Indications   Nora Tam is a 43 y.o. female who was found to have 7 mm right UPJ stone admitted and added on for intervention.  She had UTI treated with Levaquin for several days prior    During the informed consent process, the procedure was discussed in detail including the risks of bleeding, infection, damage to surrounding structures and need for staged procedure.      Findings     Fluoroscopy with interpretation: Wire placed the upper pole the kidney.  Stent placed with partial curl in the renal pelvis    Description of procedure:  The patient was properly identified in the preoperative holding area and taken to the operating room where general anesthesia was induced. The patient was placed in the cystolithotomy position and prepped and draped in a sterile fashion. The patient was given antibiotics intravenously before the start of the surgery. After ensuring that all of the required equipment was ready and available a surgical timeout was performed.     A 21 [place] rigid cystoscope was inserted into the bladder under direct visualization.   A Sensor wire was passed up the ureter under fluoroscopic guidance.  Dual-lumen was used to dilate the distal ureter.  Semirigid ureteroscope was passed into the ureter.  Stone could not be reached.  Flexible ureteroscope was then advanced alongside the wire and up to the stone which was impacted and very edematous ureter.  200 laser fiber was used to break the stone up into tiny pieces.  Large stone retropulsed back into the  kidney.  The area of impaction was inspected and was just edematous.  The remaining large stone in the kidney was further fragmented and the largest piece extracted with a basket.  The cystoscope was then replaced over the wire and a 6 [place] x 26 cm stent was placed under direct and fluoroscopic visualization.  The bladder was then emptied and scope removed.    There were no apparent complications. The patient woke up in the operating room and was taken to the recovery room in stable condition.     I was present and scrubbed for the entire procedure.     Specimens: Stone    Estimated Blood Loss: minimal      Plan   -Complete course of Levaquin, okay for discharge from urology standpoint  -Follow up with Dr. Phelps 1 to 2 weeks for stent removal         Cory Phelps MD  [facility]  [facility] [address], [address]  [place] [address] [phone]

## 2023-09-20 NOTE — ANESTHESIA POSTPROCEDURE EVALUATION
Patient: Nora Tam    Procedure Summary       Date: 09/20/23 Room / Location: Psychiatric OR 01 / [facility] MAIN OR    Anesthesia Start: 1542 Anesthesia Stop: 1614    Procedure: CYSTOSCOPY URETEROSCOPY STONE BASKET EXTRACTION HOLMIUM LASER STENT INSERTION (Right) Diagnosis:     Surgeons: Cory Phelps MD Provider: Rasheed Watts MD    Anesthesia Type: general ASA Status: 3            Anesthesia Type: general    Vitals  Vitals Value Taken Time   /80 09/20/23 1654   Temp 98.2 °F (36.8 °C) 09/20/23 1654   Pulse 93 09/20/23 1654   Resp 14 09/20/23 1654   SpO2 99 % 09/20/23 1654           Post Anesthesia Care and Evaluation    Patient location during evaluation: PACU  Patient participation: complete - patient participated  Level of consciousness: awake  Pain scale: See nurse's notes for pain score.  Pain management: adequate    Airway patency: patent  Anesthetic complications: No anesthetic complications  PONV Status: none  Cardiovascular status: acceptable  Respiratory status: acceptable and spontaneous ventilation  Hydration status: acceptable    Comments: Patient seen and examined postoperatively; vital signs stable; SpO2 greater than or equal to 90%; cardiopulmonary status stable; nausea/vomiting adequately controlled; pain adequately controlled; no apparent anesthesia complications; patient discharged from anesthesia care when discharge criteria were met

## 2023-09-20 NOTE — CASE MANAGEMENT/SOCIAL WORK
Continued Stay Note  [facility]     Patient Name: Nora Tam  MRN: [id number]  Today's Date: 9/20/2023    Admit Date: 9/17/2023    Plan: Return home alone, Drive self home. Meds to Bed   Discharge Plan       Row Name 09/20/23 0843       Plan    Plan Return home alone, Drive self home. Meds to Bed    Plan Comments Barriers: Today CYSTOSCOPY URETEROSCOPY RETROGRADE PYELOGRAM HOLMIUM LASER STENT INSERTION                        Phone communication or documentation only - no physical contact with patient or family.   Monse WEBB,RN Case Manager  [facility]  Phone: Desk- [phone]  Cell- [phone]

## 2023-09-20 NOTE — PLAN OF CARE
Goal Outcome Evaluation:         Pt is having lithotripsy today. Consent is signed and in chart.  Patient is alert and oriented. Call light is in reach.

## 2023-09-28 LAB
CALCIUM OXALATE DIHYDRATE MFR STONE IR: 20 %
COLOR STONE: NORMAL
COM MFR STONE: 80 %
COMPN STONE: NORMAL
LABORATORY COMMENT REPORT: NORMAL
LABORATORY COMMENT REPORT: NORMAL
Lab: NORMAL
Lab: NORMAL
PHOTO: NORMAL
SIZE STONE: NORMAL MM
SPEC SOURCE SUBJ: NORMAL
WT STONE: 22 MG

## 2023-10-04 ENCOUNTER — HOSPITAL ENCOUNTER (INPATIENT)
Facility: HOSPITAL | Age: 43
LOS: 2 days | Discharge: HOME | DRG: 684 | End: 2023-10-07
Attending: EMERGENCY MEDICINE | Admitting: INTERNAL MEDICINE
Payer: COMMERCIAL

## 2023-10-04 ENCOUNTER — APPOINTMENT (OUTPATIENT)
Dept: GENERAL RADIOLOGY | Facility: HOSPITAL | Age: 43
DRG: 684 | End: 2023-10-04
Payer: COMMERCIAL

## 2023-10-04 ENCOUNTER — APPOINTMENT (OUTPATIENT)
Dept: CT IMAGING | Facility: HOSPITAL | Age: 43
DRG: 684 | End: 2023-10-04
Payer: COMMERCIAL

## 2023-10-04 DIAGNOSIS — N17.9 AKI (ACUTE KIDNEY INJURY): Primary | ICD-10-CM

## 2023-10-04 DIAGNOSIS — R53.1 WEAKNESS: ICD-10-CM

## 2023-10-04 DIAGNOSIS — N17.9 ACUTE KIDNEY INJURY: ICD-10-CM

## 2023-10-04 DIAGNOSIS — R41.82 ALTERED MENTAL STATUS, UNSPECIFIED ALTERED MENTAL STATUS TYPE: ICD-10-CM

## 2023-10-04 PROBLEM — E83.51 HYPOCALCEMIA: Status: ACTIVE | Noted: 2023-10-04

## 2023-10-04 PROBLEM — E83.42 HYPOMAGNESEMIA: Status: ACTIVE | Noted: 2023-10-04

## 2023-10-04 LAB
ALBUMIN SERPL-MCNC: 3.6 G/DL (ref 3.5–5.2)
ALBUMIN/GLOB SERPL: 1 G/DL
ALP SERPL-CCNC: 110 U/L (ref 39–117)
ALT SERPL W P-5'-P-CCNC: 5 U/L (ref 1–33)
ANION GAP SERPL CALCULATED.3IONS-SCNC: 18 MMOL/L (ref 5–15)
AST SERPL-CCNC: 16 U/L (ref 1–32)
BACTERIA UR QL AUTO: ABNORMAL /HPF
BASOPHILS # BLD AUTO: 0.1 10*3/MM3 (ref 0–0.2)
BASOPHILS NFR BLD AUTO: 0.7 % (ref 0–1.5)
BILIRUB SERPL-MCNC: 0.3 MG/DL (ref 0–1.2)
BILIRUB UR QL STRIP: NEGATIVE
BUN SERPL-MCNC: 30 MG/DL (ref 6–20)
BUN/CREAT SERPL: 12.5 (ref 7–25)
CALCIUM SPEC-SCNC: 8.5 MG/DL (ref 8.6–10.5)
CHLORIDE SERPL-SCNC: 95 MMOL/L (ref 98–107)
CLARITY UR: ABNORMAL
CO2 SERPL-SCNC: 22 MMOL/L (ref 22–29)
COLOR UR: YELLOW
CREAT SERPL-MCNC: 2.4 MG/DL (ref 0.57–1)
DEPRECATED RDW RBC AUTO: 42.9 FL (ref 37–54)
EGFRCR SERPLBLD CKD-EPI 2021: 24.8 ML/MIN/1.73
EOSINOPHIL # BLD AUTO: 0.2 10*3/MM3 (ref 0–0.4)
EOSINOPHIL NFR BLD AUTO: 2.1 % (ref 0.3–6.2)
ERYTHROCYTE [DISTWIDTH] IN BLOOD BY AUTOMATED COUNT: 14.9 % (ref 12.3–15.4)
GLOBULIN UR ELPH-MCNC: 3.5 GM/DL
GLUCOSE BLDC GLUCOMTR-MCNC: 257 MG/DL (ref 70–105)
GLUCOSE BLDC GLUCOMTR-MCNC: 302 MG/DL (ref 70–105)
GLUCOSE SERPL-MCNC: 364 MG/DL (ref 65–99)
GLUCOSE UR STRIP-MCNC: NEGATIVE MG/DL
GRAN CASTS URNS QL MICRO: ABNORMAL /LPF
HCT VFR BLD AUTO: 36 % (ref 34–46.6)
HGB BLD-MCNC: 11.6 G/DL (ref 12–15.9)
HGB UR QL STRIP.AUTO: NEGATIVE
HOLD SPECIMEN: NORMAL
HYALINE CASTS UR QL AUTO: ABNORMAL /LPF
KETONES UR QL STRIP: ABNORMAL
LEUKOCYTE ESTERASE UR QL STRIP.AUTO: ABNORMAL
LYMPHOCYTES # BLD AUTO: 1.1 10*3/MM3 (ref 0.7–3.1)
LYMPHOCYTES NFR BLD AUTO: 11.8 % (ref 19.6–45.3)
MAGNESIUM SERPL-MCNC: 1.2 MG/DL (ref 1.6–2.6)
MCH RBC QN AUTO: 26.6 PG (ref 26.6–33)
MCHC RBC AUTO-ENTMCNC: 32.3 G/DL (ref 31.5–35.7)
MCV RBC AUTO: 82.3 FL (ref 79–97)
MONOCYTES # BLD AUTO: 0.7 10*3/MM3 (ref 0.1–0.9)
MONOCYTES NFR BLD AUTO: 7.3 % (ref 5–12)
NEUTROPHILS NFR BLD AUTO: 7 10*3/MM3 (ref 1.7–7)
NEUTROPHILS NFR BLD AUTO: 78.1 % (ref 42.7–76)
NITRITE UR QL STRIP: NEGATIVE
NRBC BLD AUTO-RTO: 0.1 /100 WBC (ref 0–0.2)
PH UR STRIP.AUTO: <=5 [PH] (ref 5–8)
PLATELET # BLD AUTO: 416 10*3/MM3 (ref 140–450)
PMV BLD AUTO: 7.6 FL (ref 6–12)
POTASSIUM SERPL-SCNC: 3.4 MMOL/L (ref 3.5–5.2)
PROT SERPL-MCNC: 7.1 G/DL (ref 6–8.5)
PROT UR QL STRIP: ABNORMAL
RBC # BLD AUTO: 4.38 10*6/MM3 (ref 3.77–5.28)
RBC # UR STRIP: ABNORMAL /HPF
REF LAB TEST METHOD: ABNORMAL
SODIUM SERPL-SCNC: 135 MMOL/L (ref 136–145)
SP GR UR STRIP: 1.02 (ref 1–1.03)
SQUAMOUS #/AREA URNS HPF: ABNORMAL /HPF
UNIDENT CRYS URNS QL MICRO: ABNORMAL /HPF
UROBILINOGEN UR QL STRIP: ABNORMAL
WBC # UR STRIP: ABNORMAL /HPF
WBC NRBC COR # BLD: 9 10*3/MM3 (ref 3.4–10.8)
WHOLE BLOOD HOLD COAG: NORMAL
YEAST URNS QL MICRO: ABNORMAL /HPF

## 2023-10-04 PROCEDURE — 70450 CT HEAD/BRAIN W/O DYE: CPT

## 2023-10-04 PROCEDURE — 82948 REAGENT STRIP/BLOOD GLUCOSE: CPT

## 2023-10-04 PROCEDURE — 71045 X-RAY EXAM CHEST 1 VIEW: CPT

## 2023-10-04 PROCEDURE — 99285 EMERGENCY DEPT VISIT HI MDM: CPT

## 2023-10-04 PROCEDURE — G0378 HOSPITAL OBSERVATION PER HR: HCPCS

## 2023-10-04 PROCEDURE — 63710000001 INSULIN LISPRO (HUMAN) PER 5 UNITS

## 2023-10-04 PROCEDURE — 87086 URINE CULTURE/COLONY COUNT: CPT | Performed by: EMERGENCY MEDICINE

## 2023-10-04 PROCEDURE — 81001 URINALYSIS AUTO W/SCOPE: CPT | Performed by: EMERGENCY MEDICINE

## 2023-10-04 PROCEDURE — 80053 COMPREHEN METABOLIC PANEL: CPT | Performed by: EMERGENCY MEDICINE

## 2023-10-04 PROCEDURE — 25810000003 SODIUM CHLORIDE 0.9 % SOLUTION: Performed by: EMERGENCY MEDICINE

## 2023-10-04 PROCEDURE — 93005 ELECTROCARDIOGRAM TRACING: CPT | Performed by: EMERGENCY MEDICINE

## 2023-10-04 PROCEDURE — 83735 ASSAY OF MAGNESIUM: CPT | Performed by: EMERGENCY MEDICINE

## 2023-10-04 PROCEDURE — 85025 COMPLETE CBC W/AUTO DIFF WBC: CPT | Performed by: EMERGENCY MEDICINE

## 2023-10-04 RX ORDER — ROSUVASTATIN CALCIUM 10 MG/1
20 TABLET, COATED ORAL DAILY
Status: DISCONTINUED | OUTPATIENT
Start: 2023-10-05 | End: 2023-10-07 | Stop reason: HOSPADM

## 2023-10-04 RX ORDER — POLYETHYLENE GLYCOL 3350 17 G/17G
17 POWDER, FOR SOLUTION ORAL DAILY PRN
Status: DISCONTINUED | OUTPATIENT
Start: 2023-10-04 | End: 2023-10-07 | Stop reason: HOSPADM

## 2023-10-04 RX ORDER — BISACODYL 5 MG/1
5 TABLET, DELAYED RELEASE ORAL DAILY PRN
Status: DISCONTINUED | OUTPATIENT
Start: 2023-10-04 | End: 2023-10-07 | Stop reason: HOSPADM

## 2023-10-04 RX ORDER — ONDANSETRON 2 MG/ML
4 INJECTION INTRAMUSCULAR; INTRAVENOUS EVERY 6 HOURS PRN
Status: DISCONTINUED | OUTPATIENT
Start: 2023-10-04 | End: 2023-10-07 | Stop reason: HOSPADM

## 2023-10-04 RX ORDER — LEVOFLOXACIN 5 MG/ML
750 INJECTION, SOLUTION INTRAVENOUS ONCE
Status: COMPLETED | OUTPATIENT
Start: 2023-10-04 | End: 2023-10-05

## 2023-10-04 RX ORDER — CHOLECALCIFEROL (VITAMIN D3) 125 MCG
5 CAPSULE ORAL NIGHTLY PRN
Status: DISCONTINUED | OUTPATIENT
Start: 2023-10-04 | End: 2023-10-07 | Stop reason: HOSPADM

## 2023-10-04 RX ORDER — ACETAMINOPHEN 325 MG/1
650 TABLET ORAL EVERY 4 HOURS PRN
Status: DISCONTINUED | OUTPATIENT
Start: 2023-10-04 | End: 2023-10-07 | Stop reason: HOSPADM

## 2023-10-04 RX ORDER — SODIUM CHLORIDE 0.9 % (FLUSH) 0.9 %
10 SYRINGE (ML) INJECTION AS NEEDED
Status: DISCONTINUED | OUTPATIENT
Start: 2023-10-04 | End: 2023-10-07 | Stop reason: HOSPADM

## 2023-10-04 RX ORDER — HYDRALAZINE HYDROCHLORIDE 20 MG/ML
10 INJECTION INTRAMUSCULAR; INTRAVENOUS EVERY 6 HOURS PRN
Status: DISCONTINUED | OUTPATIENT
Start: 2023-10-04 | End: 2023-10-07 | Stop reason: HOSPADM

## 2023-10-04 RX ORDER — POTASSIUM CHLORIDE 20 MEQ/1
40 TABLET, EXTENDED RELEASE ORAL EVERY 4 HOURS
Status: COMPLETED | OUTPATIENT
Start: 2023-10-04 | End: 2023-10-05

## 2023-10-04 RX ORDER — SODIUM CHLORIDE 9 MG/ML
40 INJECTION, SOLUTION INTRAVENOUS AS NEEDED
Status: DISCONTINUED | OUTPATIENT
Start: 2023-10-04 | End: 2023-10-07 | Stop reason: HOSPADM

## 2023-10-04 RX ORDER — DEXTROSE MONOHYDRATE 25 G/50ML
25 INJECTION, SOLUTION INTRAVENOUS
Status: DISCONTINUED | OUTPATIENT
Start: 2023-10-04 | End: 2023-10-07 | Stop reason: HOSPADM

## 2023-10-04 RX ORDER — MAGNESIUM SULFATE HEPTAHYDRATE 40 MG/ML
2 INJECTION, SOLUTION INTRAVENOUS
Status: COMPLETED | OUTPATIENT
Start: 2023-10-04 | End: 2023-10-05

## 2023-10-04 RX ORDER — AMOXICILLIN 250 MG
2 CAPSULE ORAL 2 TIMES DAILY
Status: DISCONTINUED | OUTPATIENT
Start: 2023-10-04 | End: 2023-10-07 | Stop reason: HOSPADM

## 2023-10-04 RX ORDER — NICOTINE POLACRILEX 4 MG
15 LOZENGE BUCCAL
Status: DISCONTINUED | OUTPATIENT
Start: 2023-10-04 | End: 2023-10-07 | Stop reason: HOSPADM

## 2023-10-04 RX ORDER — SODIUM CHLORIDE 0.9 % (FLUSH) 0.9 %
10 SYRINGE (ML) INJECTION EVERY 12 HOURS SCHEDULED
Status: DISCONTINUED | OUTPATIENT
Start: 2023-10-04 | End: 2023-10-07 | Stop reason: HOSPADM

## 2023-10-04 RX ORDER — ONDANSETRON 4 MG/1
4 TABLET, FILM COATED ORAL EVERY 6 HOURS PRN
Status: DISCONTINUED | OUTPATIENT
Start: 2023-10-04 | End: 2023-10-07 | Stop reason: HOSPADM

## 2023-10-04 RX ORDER — BISACODYL 10 MG
10 SUPPOSITORY, RECTAL RECTAL DAILY PRN
Status: DISCONTINUED | OUTPATIENT
Start: 2023-10-04 | End: 2023-10-07 | Stop reason: HOSPADM

## 2023-10-04 RX ORDER — IBUPROFEN 600 MG/1
1 TABLET ORAL
Status: DISCONTINUED | OUTPATIENT
Start: 2023-10-04 | End: 2023-10-07 | Stop reason: HOSPADM

## 2023-10-04 RX ORDER — INSULIN LISPRO 100 [IU]/ML
2-9 INJECTION, SOLUTION INTRAVENOUS; SUBCUTANEOUS
Status: DISCONTINUED | OUTPATIENT
Start: 2023-10-04 | End: 2023-10-07 | Stop reason: HOSPADM

## 2023-10-04 RX ORDER — PANTOPRAZOLE SODIUM 40 MG/10ML
40 INJECTION, POWDER, LYOPHILIZED, FOR SOLUTION INTRAVENOUS
Status: DISCONTINUED | OUTPATIENT
Start: 2023-10-05 | End: 2023-10-05

## 2023-10-04 RX ADMIN — SODIUM CHLORIDE 1000 ML: 9 INJECTION, SOLUTION INTRAVENOUS at 22:24

## 2023-10-04 RX ADMIN — POTASSIUM CHLORIDE 40 MEQ: 1500 TABLET, EXTENDED RELEASE ORAL at 23:47

## 2023-10-04 RX ADMIN — Medication 10 ML: at 22:26

## 2023-10-04 RX ADMIN — INSULIN LISPRO 7 UNITS: 100 INJECTION, SOLUTION INTRAVENOUS; SUBCUTANEOUS at 23:45

## 2023-10-04 NOTE — Clinical Note
Level of Care: Med/Surg [1]   Diagnosis: Altered mental status [780.97.ICD-9-CM]   Admitting Physician: TERESA RICHARDSON [[id number]]   Attending Physician: TERESA RICHARDSON [[id number]]

## 2023-10-05 ENCOUNTER — APPOINTMENT (OUTPATIENT)
Dept: ULTRASOUND IMAGING | Facility: HOSPITAL | Age: 43
DRG: 684 | End: 2023-10-05
Payer: COMMERCIAL

## 2023-10-05 PROBLEM — N17.9 AKI (ACUTE KIDNEY INJURY): Status: ACTIVE | Noted: 2023-10-05

## 2023-10-05 LAB
ANION GAP SERPL CALCULATED.3IONS-SCNC: 15 MMOL/L (ref 5–15)
BASOPHILS # BLD AUTO: 0 10*3/MM3 (ref 0–0.2)
BASOPHILS # BLD AUTO: 0.1 10*3/MM3 (ref 0–0.2)
BASOPHILS NFR BLD AUTO: 0.5 % (ref 0–1.5)
BASOPHILS NFR BLD AUTO: 0.7 % (ref 0–1.5)
BUN SERPL-MCNC: 34 MG/DL (ref 6–20)
BUN/CREAT SERPL: 11.2 (ref 7–25)
CALCIUM SPEC-SCNC: 8.6 MG/DL (ref 8.6–10.5)
CHLORIDE SERPL-SCNC: 101 MMOL/L (ref 98–107)
CO2 SERPL-SCNC: 22 MMOL/L (ref 22–29)
CREAT SERPL-MCNC: 3.03 MG/DL (ref 0.57–1)
DEPRECATED RDW RBC AUTO: 44.2 FL (ref 37–54)
DEPRECATED RDW RBC AUTO: 44.2 FL (ref 37–54)
EGFRCR SERPLBLD CKD-EPI 2021: 18.8 ML/MIN/1.73
EOSINOPHIL # BLD AUTO: 0.2 10*3/MM3 (ref 0–0.4)
EOSINOPHIL # BLD AUTO: 0.2 10*3/MM3 (ref 0–0.4)
EOSINOPHIL NFR BLD AUTO: 1.7 % (ref 0.3–6.2)
EOSINOPHIL NFR BLD AUTO: 2 % (ref 0.3–6.2)
EOSINOPHIL SPEC QL MICRO: 0 % EOS/100 CELLS (ref 0–0)
ERYTHROCYTE [DISTWIDTH] IN BLOOD BY AUTOMATED COUNT: 14.7 % (ref 12.3–15.4)
ERYTHROCYTE [DISTWIDTH] IN BLOOD BY AUTOMATED COUNT: 15 % (ref 12.3–15.4)
GLUCOSE BLDC GLUCOMTR-MCNC: 118 MG/DL (ref 70–105)
GLUCOSE BLDC GLUCOMTR-MCNC: 193 MG/DL (ref 70–105)
GLUCOSE BLDC GLUCOMTR-MCNC: 211 MG/DL (ref 70–105)
GLUCOSE BLDC GLUCOMTR-MCNC: 277 MG/DL (ref 70–105)
GLUCOSE SERPL-MCNC: 130 MG/DL (ref 65–99)
HCT VFR BLD AUTO: 27.8 % (ref 34–46.6)
HCT VFR BLD AUTO: 31.8 % (ref 34–46.6)
HGB BLD-MCNC: 10.2 G/DL (ref 12–15.9)
HGB BLD-MCNC: 9.4 G/DL (ref 12–15.9)
LYMPHOCYTES # BLD AUTO: 0.9 10*3/MM3 (ref 0.7–3.1)
LYMPHOCYTES # BLD AUTO: 0.9 10*3/MM3 (ref 0.7–3.1)
LYMPHOCYTES NFR BLD AUTO: 11.7 % (ref 19.6–45.3)
LYMPHOCYTES NFR BLD AUTO: 8.9 % (ref 19.6–45.3)
MAGNESIUM SERPL-MCNC: 3.4 MG/DL (ref 1.6–2.6)
MCH RBC QN AUTO: 26.7 PG (ref 26.6–33)
MCH RBC QN AUTO: 27.1 PG (ref 26.6–33)
MCHC RBC AUTO-ENTMCNC: 32.2 G/DL (ref 31.5–35.7)
MCHC RBC AUTO-ENTMCNC: 33.7 G/DL (ref 31.5–35.7)
MCV RBC AUTO: 80.6 FL (ref 79–97)
MCV RBC AUTO: 83 FL (ref 79–97)
MONOCYTES # BLD AUTO: 0.8 10*3/MM3 (ref 0.1–0.9)
MONOCYTES # BLD AUTO: 0.8 10*3/MM3 (ref 0.1–0.9)
MONOCYTES NFR BLD AUTO: 8.2 % (ref 5–12)
MONOCYTES NFR BLD AUTO: 9.5 % (ref 5–12)
NEUTROPHILS NFR BLD AUTO: 6.1 10*3/MM3 (ref 1.7–7)
NEUTROPHILS NFR BLD AUTO: 7.9 10*3/MM3 (ref 1.7–7)
NEUTROPHILS NFR BLD AUTO: 76.3 % (ref 42.7–76)
NEUTROPHILS NFR BLD AUTO: 80.5 % (ref 42.7–76)
NRBC BLD AUTO-RTO: 0 /100 WBC (ref 0–0.2)
NRBC BLD AUTO-RTO: 0.1 /100 WBC (ref 0–0.2)
PLATELET # BLD AUTO: 332 10*3/MM3 (ref 140–450)
PLATELET # BLD AUTO: 394 10*3/MM3 (ref 140–450)
PMV BLD AUTO: 7.5 FL (ref 6–12)
PMV BLD AUTO: 7.6 FL (ref 6–12)
POTASSIUM SERPL-SCNC: 4.2 MMOL/L (ref 3.5–5.2)
POTASSIUM SERPL-SCNC: 4.2 MMOL/L (ref 3.5–5.2)
QT INTERVAL: 348 MS
QTC INTERVAL: 455 MS
RBC # BLD AUTO: 3.45 10*6/MM3 (ref 3.77–5.28)
RBC # BLD AUTO: 3.84 10*6/MM3 (ref 3.77–5.28)
SODIUM SERPL-SCNC: 138 MMOL/L (ref 136–145)
WBC NRBC COR # BLD: 8 10*3/MM3 (ref 3.4–10.8)
WBC NRBC COR # BLD: 9.8 10*3/MM3 (ref 3.4–10.8)

## 2023-10-05 PROCEDURE — 84132 ASSAY OF SERUM POTASSIUM: CPT | Performed by: INTERNAL MEDICINE

## 2023-10-05 PROCEDURE — 25010000002 LEVOFLOXACIN PER 250 MG: Performed by: EMERGENCY MEDICINE

## 2023-10-05 PROCEDURE — 82550 ASSAY OF CK (CPK): CPT | Performed by: INTERNAL MEDICINE

## 2023-10-05 PROCEDURE — 25010000002 MAGNESIUM SULFATE 2 GM/50ML SOLUTION: Performed by: INTERNAL MEDICINE

## 2023-10-05 PROCEDURE — 82948 REAGENT STRIP/BLOOD GLUCOSE: CPT

## 2023-10-05 PROCEDURE — 80048 BASIC METABOLIC PNL TOTAL CA: CPT

## 2023-10-05 PROCEDURE — 85025 COMPLETE CBC W/AUTO DIFF WBC: CPT

## 2023-10-05 PROCEDURE — 63710000001 INSULIN GLARGINE PER 5 UNITS: Performed by: INTERNAL MEDICINE

## 2023-10-05 PROCEDURE — 80069 RENAL FUNCTION PANEL: CPT | Performed by: INTERNAL MEDICINE

## 2023-10-05 PROCEDURE — 36415 COLL VENOUS BLD VENIPUNCTURE: CPT

## 2023-10-05 PROCEDURE — 83735 ASSAY OF MAGNESIUM: CPT | Performed by: INTERNAL MEDICINE

## 2023-10-05 PROCEDURE — 87205 SMEAR GRAM STAIN: CPT | Performed by: INTERNAL MEDICINE

## 2023-10-05 PROCEDURE — 25810000003 SODIUM CHLORIDE 0.9 % SOLUTION: Performed by: INTERNAL MEDICINE

## 2023-10-05 PROCEDURE — 63710000001 INSULIN LISPRO (HUMAN) PER 5 UNITS

## 2023-10-05 PROCEDURE — 84550 ASSAY OF BLOOD/URIC ACID: CPT | Performed by: INTERNAL MEDICINE

## 2023-10-05 PROCEDURE — 76775 US EXAM ABDO BACK WALL LIM: CPT

## 2023-10-05 RX ORDER — TOPIRAMATE 25 MG/1
50 TABLET ORAL EVERY 12 HOURS SCHEDULED
Status: DISCONTINUED | OUTPATIENT
Start: 2023-10-05 | End: 2023-10-07 | Stop reason: HOSPADM

## 2023-10-05 RX ORDER — CYCLOBENZAPRINE HCL 10 MG
10 TABLET ORAL NIGHTLY PRN
Status: DISCONTINUED | OUTPATIENT
Start: 2023-10-05 | End: 2023-10-06

## 2023-10-05 RX ORDER — PANTOPRAZOLE SODIUM 40 MG/1
40 TABLET, DELAYED RELEASE ORAL DAILY
Status: DISCONTINUED | OUTPATIENT
Start: 2023-10-06 | End: 2023-10-06

## 2023-10-05 RX ORDER — GABAPENTIN 100 MG/1
200 CAPSULE ORAL 3 TIMES DAILY
Status: DISCONTINUED | OUTPATIENT
Start: 2023-10-05 | End: 2023-10-06

## 2023-10-05 RX ORDER — SODIUM CHLORIDE 9 MG/ML
125 INJECTION, SOLUTION INTRAVENOUS CONTINUOUS
Status: DISCONTINUED | OUTPATIENT
Start: 2023-10-05 | End: 2023-10-07

## 2023-10-05 RX ADMIN — GABAPENTIN 200 MG: 100 CAPSULE ORAL at 17:06

## 2023-10-05 RX ADMIN — Medication 10 ML: at 08:21

## 2023-10-05 RX ADMIN — SENNOSIDES AND DOCUSATE SODIUM 2 TABLET: 50; 8.6 TABLET ORAL at 20:47

## 2023-10-05 RX ADMIN — INSULIN LISPRO 2 UNITS: 100 INJECTION, SOLUTION INTRAVENOUS; SUBCUTANEOUS at 17:06

## 2023-10-05 RX ADMIN — MAGNESIUM SULFATE HEPTAHYDRATE 2 G: 2 INJECTION, SOLUTION INTRAVENOUS at 03:24

## 2023-10-05 RX ADMIN — SENNOSIDES AND DOCUSATE SODIUM 2 TABLET: 50; 8.6 TABLET ORAL at 08:20

## 2023-10-05 RX ADMIN — INSULIN GLARGINE 25 UNITS: 100 INJECTION, SOLUTION SUBCUTANEOUS at 20:47

## 2023-10-05 RX ADMIN — ACETAMINOPHEN 650 MG: 325 TABLET, FILM COATED ORAL at 20:54

## 2023-10-05 RX ADMIN — ACETAMINOPHEN 650 MG: 325 TABLET, FILM COATED ORAL at 09:16

## 2023-10-05 RX ADMIN — SODIUM CHLORIDE 125 ML/HR: 9 INJECTION, SOLUTION INTRAVENOUS at 23:22

## 2023-10-05 RX ADMIN — POTASSIUM CHLORIDE 40 MEQ: 1500 TABLET, EXTENDED RELEASE ORAL at 03:24

## 2023-10-05 RX ADMIN — MAGNESIUM SULFATE HEPTAHYDRATE 2 G: 2 INJECTION, SOLUTION INTRAVENOUS at 05:24

## 2023-10-05 RX ADMIN — SODIUM CHLORIDE 125 ML/HR: 9 INJECTION, SOLUTION INTRAVENOUS at 15:53

## 2023-10-05 RX ADMIN — Medication 10 ML: at 20:48

## 2023-10-05 RX ADMIN — MAGNESIUM SULFATE HEPTAHYDRATE 2 G: 2 INJECTION, SOLUTION INTRAVENOUS at 01:39

## 2023-10-05 RX ADMIN — PANTOPRAZOLE SODIUM 40 MG: 40 INJECTION, POWDER, LYOPHILIZED, FOR SOLUTION INTRAVENOUS at 05:25

## 2023-10-05 RX ADMIN — LEVOFLOXACIN 750 MG: 5 INJECTION, SOLUTION INTRAVENOUS at 00:00

## 2023-10-05 RX ADMIN — GABAPENTIN 200 MG: 100 CAPSULE ORAL at 20:47

## 2023-10-05 RX ADMIN — ROSUVASTATIN 20 MG: 10 TABLET, FILM COATED ORAL at 08:20

## 2023-10-05 RX ADMIN — TOPIRAMATE 50 MG: 25 TABLET, FILM COATED ORAL at 20:46

## 2023-10-05 RX ADMIN — INSULIN LISPRO 4 UNITS: 100 INJECTION, SOLUTION INTRAVENOUS; SUBCUTANEOUS at 11:44

## 2023-10-05 RX ADMIN — INSULIN LISPRO 6 UNITS: 100 INJECTION, SOLUTION INTRAVENOUS; SUBCUTANEOUS at 20:47

## 2023-10-05 NOTE — CASE MANAGEMENT/SOCIAL WORK
Discharge Planning Assessment   [facility]     Patient Name: Nora Tam  MRN: [id number]  Today's Date: 10/5/2023    Admit Date: 10/4/2023    Plan: Home at discharge.   Discharge Needs Assessment       Row Name 10/05/23 1400       Living Environment    People in Home alone    Current Living Arrangements home    Potentially Unsafe Housing Conditions none    Primary Care Provided by self    Provides Primary Care For no one    Family Caregiver if Needed parent(s)    Quality of Family Relationships helpful;involved;supportive    Able to Return to Prior Arrangements yes       Resource/Environmental Concerns    Resource/Environmental Concerns none    Transportation Concerns none       Transition Planning    Patient/Family Anticipates Transition to home    Patient/Family Anticipated Services at Transition none    Transportation Anticipated family or friend will provide       Discharge Needs Assessment    Readmission Within the Last 30 Days no previous admission in last 30 days    Equipment Currently Used at Home glucometer    Concerns to be Addressed discharge planning    Anticipated Changes Related to Illness none    Equipment Needed After Discharge none                   Discharge Plan       Row Name 10/05/23 1400       Plan    Plan Home at discharge.    Patient/Family in Agreement with Plan yes    Plan Comments Patient lives at home alone. Patient drives, mother or father will transport at discharge. Patient performs ADL. PCP and pharmacy confirmed. Denies financial assistance needs for medication and/or food. Denies HH and/or rehab needs. D/C barriers: hypokalemia, elevated BUN/Cr, pending urine culture, diabetes educator consulted.                  Demographic Summary       Row Name 10/05/23 1400       General Information    Admission Type observation    Arrived From emergency department    Referral Source admission list    Reason for Consult discharge planning    Preferred Language English                    Functional Status       Row Name 10/05/23 1400       Functional Status    Usual Activity Tolerance good    Current Activity Tolerance good       Functional Status, IADL    Medications independent    Meal Preparation independent    Housekeeping independent    Laundry independent    Shopping independent             Talia Powell RN, BSN  3A/2A   [address] [address]  Phone: [phone]  Fax: [phone]

## 2023-10-05 NOTE — CONSULTS
"Diabetes Education  Assessment/Teaching    Patient Name:  Nora Tam  YOB: 1980  MRN: [id number]  Admit Date:  10/4/2023      Assessment Date:  10/5/2023  Flowsheet Row Most Recent Value   General Information     Referral From: MD order  [Consult received due to bs >200. Adm bs 364. Last A1c in [facility] was from 9/17/2023 and result 10.1%.]   Height 177.8 cm (70\")   Weight 71.2 kg (156 lb 15.5 oz)   Weight Method Standing scale   Pregnancy Assessment    Diabetes History    What type of diabetes do you have? Type 2   Length of Diabetes Diagnosis --  [Dx at age 16 years]   Do you test your blood sugar at home? yes   Frequency of checks throughout the day   Meter type has CGMS, Freestyle Quinn 2, pt also has bs meter (unsure of name) to check when not feeling as sensor readings are indicating   Who performs the test? self   Typical readings lately, bs has been elevated   Have you had low blood sugar? (<70mg/dl) yes   How often do you have low blood sugar? 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 Discussed healthy bs range and healthy A1c target. Discussed importance of bs control.   Medication Insulin, Oral, Other injectables, Pen  [Pt saw MD recently and MD increased Lantus from 10 units to 25 units at hs. Pt also taking Metformin 1000 mg bid. Pt to be taking Mounjara 5 mg weekly but has not taken for 2 weeks.]   Problem Solving Hypoglycemia, Hyperglycemia, Signs, Symptoms, Treatment   Reducing Risks A1C testing  [Gave A1c info sheet.]   Healthy Eating --  [Pt usually eating 2 meals/day]   Motivation Engaged   Teaching Method Explanation, Discussion, Handouts   Patient Response Verbalized understanding              Other Comments:  Met with pt at bedside. Pt " admitted to [facility] on 10/4/2023 at 2037. On MAR pt scheduled to receive sliding scale insulin. Pt does not have Lantus ordered. Pt states she did receive her lantus dose last night. Pt has lantus pen with her and took dose herself. Discussed educator would reach out to hospitalist to get lantus ordered for this admission. Pt states she took Lantus 25 units last night. BS this am at 0804 118.     Pt states has been having issues with bs running high (greater than 400). Encouraged pt to discuss with PCP about having rapid-acting insulin ordered with sliding scale. Educator explained rapid-acting insulin is what pt would want to use if bs elevated and wanting to decrease it more quickly. Pt states will discuss with MD.     Pt states has insulin and sensor/bs monitoring supplies at home. Pt states not needing additional info at this time.         Electronically signed by:  Anabell Rodriguez RN  10/05/23 14:01 EDT

## 2023-10-05 NOTE — PLAN OF CARE
Goal Outcome Evaluation:              Outcome Evaluation: Patieint admitted with confusion and an JEROD.  Patient potassium and mag is currently being replaced.  Pt AOX4 since she has been on this floor.  Patient stable at this time.

## 2023-10-05 NOTE — H&P
Patient Care Team:  Akash Weiner MD as PCP - General (Family Medicine)    Chief complaint altered mental status    Subjective     Patient is a 43 y.o. female who was recently discharged from the hospital on September 20 after having an obstruction stone with ESWL with stent placement as well as UTI comes in today with complaints of confusion, dizziness, difficulty walking, and trouble getting her words out yesterday and today intermittently. She states that she went to her PCP today and she had a urinalysis that was unremarkable, and was sent to the ER. She denies any fever, chills, nausea, vomiting, loss of vision, slurred speech, chest pain, urinary complaints.       In the ER EKG ectopic atrial rhythm rate of 100 normal axis no hypertrophy QTc of 455 this is an abnormal EKG but unchanged from 9/19/2023. CT head with no acute findings. CXR unremarkable, Potassium 3.4, BUN 30, creatinine 2.4, Magnesium 1.2, Calcium 8.5, UA with moderate leuks.     Chart review:  Patient just left the hospital within the last couple weeks she had an obstructing kidney stone with stent placement, and also a UTI. She was sent home on Levaquin. She had a BUN of 29 creatinine 0.61 on September 20 today creatinine is 2.4.     Onset of symptoms was ongoing    Review of Systems   HENT: Negative.     Eyes: Negative.    Respiratory: Negative.     Cardiovascular: Negative.    Endocrine: Negative.    Genitourinary: Negative.    Musculoskeletal:  Positive for gait problem.   Skin: Negative.    Neurological:  Positive for speech difficulty and weakness.   Psychiatric/Behavioral:  Positive for confusion.         History  Past Medical History:   Diagnosis Date    DDD (degenerative disc disease), cervical     Diabetes mellitus     PCOS (polycystic ovarian syndrome)     Porphyria      Past Surgical History:   Procedure Laterality Date    CERVICAL SPINE SURGERY      CYSTOSCOPY, URETEROSCOPY, RETROGRADE PYELOGRAM, STENT INSERTION Right  9/20/2023    Procedure: CYSTOSCOPY URETEROSCOPY STONE BASKET EXTRACTION HOLMIUM LASER STENT INSERTION;  Surgeon: Cory Phelps MD;  Location: [facility] MAIN OR;  Service: Urology;  Laterality: Right;     Family History   Problem Relation Age of Onset    No Known Problems Mother     No Known Problems Father     No Known Problems Sister     No Known Problems Brother     No Known Problems Maternal Aunt     No Known Problems Maternal Uncle     No Known Problems Paternal Aunt     No Known Problems Paternal Uncle     No Known Problems Maternal Grandmother     No Known Problems Maternal Grandfather     No Known Problems Paternal Grandmother     No Known Problems Paternal Grandfather     No Known Problems Other     Anemia Neg Hx     Arrhythmia Neg Hx     Asthma Neg Hx     Clotting disorder Neg Hx     Fainting Neg Hx     Heart attack Neg Hx     Heart disease Neg Hx     Heart failure Neg Hx     Hyperlipidemia Neg Hx     Hypertension Neg Hx      Social History     Tobacco Use    Smoking status: Former     Types: Electronic Cigarette    Smokeless tobacco: Never   Vaping Use    Vaping Use: Former   Substance Use Topics    Alcohol use: Yes     Comment: occassionally    Drug use: Never     (Not in a hospital admission)    Allergies:  Adhesive tape, Cephalosporins, and Lisinopril    Objective     Vital Signs  Temp:  [97 °F (36.1 °C)] 97 °F (36.1 °C)  Heart Rate:  [] 91  Resp:  [17-18] 18  BP: ()/(67-95) 106/72     Physical Exam:      General Appearance:    Alert, cooperative, in no acute distress   Head:    Normocephalic, without obvious abnormality, atraumatic   Eyes:            Lids and lashes normal, conjunctivae and sclerae normal, no   icterus, no pallor, corneas clear, PERRLA   Ears:    Ears appear intact with no abnormalities noted   Throat:   No oral lesions, no thrush, oral mucosa moist   Neck:   No adenopathy, supple, trachea midline, no thyromegaly, no   carotid bruit, no JVD   Lungs:     Clear to  auscultation,respirations regular, even and                  unlabored    Heart:    Regular rhythm and normal rate, normal S1 and S2, no            murmur, no gallop, no rub, no click   Chest Wall:    No abnormalities observed   Abdomen:     Normal bowel sounds, no masses, no organomegaly, soft        non-tender, non-distended, no guarding, no rebound                tenderness   Extremities:   Moves all extremities well, no edema, no cyanosis, no             redness   Pulses:   Pulses palpable and equal bilaterally   Skin:   No bleeding, bruising or rash   Lymph nodes:   No palpable adenopathy   Neurologic:   No focal deficits noted, Walks without difficulty, oriented x4       Results Review:     Imaging Results (Last 24 Hours)       Procedure Component Value Units Date/Time    CT Head Without Contrast [[id number]] Collected: 10/04/23 2155     Updated: 10/04/23 2158    Narrative:      CT HEAD WO CONTRAST    Date of Exam: 10/4/2023 9:30 PM EDT    Indication: Ataxia speech difficulty weakness.    Comparison: None available.    Technique: Axial CT images were obtained of the head without contrast administration.  Coronal reconstructions were performed.  Automated exposure control and iterative reconstruction methods were used.      Findings:      The ventricles and subarachnoid spaces are unremarkable.    There is no intracranial hemorrhage.    There is no evidence of resent large arterial distribution infarct.    There is no edema or mass effect. No midline shift.    Limited evaluation of the orbits is unremarkable    The visualized paranasal sinuses are unremarkable..    Evaluation of the osseous structure at the appropriate bones window is unremarkable.      Impression:      Impression:    No acute intracranial pathology.              Electronically Signed: Cleveland Bruce MD    10/4/2023 9:56 PM EDT    Workstation ID: [id number]    XR Chest 1 View [[id number]] Collected: 10/04/23 2141     Updated: 10/04/23 2144     Narrative:      XR CHEST 1 VW    Date of Exam: 10/4/2023 9:19 PM EDT    Indication: Altered mental status    Comparison: None available.    Findings:         There is no acute infiltrate.     There is no large pleural effusion.    The cardiac silhouette is unremarkable.    The visualized reinaldo structures demonstrate no abnormality.        Impression:      Impression:    No acute pulmonary disease.      Electronically Signed: Cleveland Bruce MD    10/4/2023 9:42 PM EDT    Workstation ID: [id number]             Lab Results (last 24 hours)       Procedure Component Value Units Date/Time    POC Glucose Once [[id number]]  (Abnormal) Collected: 10/04/23 2339    Specimen: Blood Updated: 10/04/23 2340     Glucose 302 mg/dL      Comment: Serial Number: [id number]:  [id number]       Urine Culture - Urine, Urine, Clean Catch [[id number]] Collected: 10/04/23 2213    Specimen: Urine, Clean Catch Updated: 10/04/23 2313    Urinalysis, Microscopic Only - Urine, Clean Catch [[id number]]  (Abnormal) Collected: 10/04/23 2213    Specimen: Urine, Clean Catch Updated: 10/04/23 2249     RBC, UA 0-2 /HPF      WBC, UA Too Numerous to Count /HPF      Bacteria, UA 2+ /HPF      Squamous Epithelial Cells, UA 13-20 /HPF      Yeast, UA Large/3+ Budding Yeast /HPF      Hyaline Casts, UA 3-6 /LPF      Granular Casts, UA 3-6 /LPF      Amorphous Urate Crystals, UA Moderate/2+ /HPF      Methodology Manual Light Microscopy    Urinalysis With Microscopic If Indicated (No Culture) - Urine, Clean Catch [[id number]]  (Abnormal) Collected: 10/04/23 2213    Specimen: Urine, Clean Catch Updated: 10/04/23 2227     Color, UA Yellow     Appearance, UA Turbid     pH, UA <=5.0     Specific Gravity, UA 1.017     Glucose, UA Negative     Ketones, UA Trace     Bilirubin, UA Negative     Blood, UA Negative     Protein,  mg/dL (2+)     Leuk Esterase, UA Moderate (2+)     Nitrite, UA Negative     Urobilinogen, UA 1.0 E.U./dL    Extra Tubes [[id number]] Collected:  10/04/23 2103    Specimen: Blood, Venous Line Updated: 10/04/23 2215    Narrative:      The following orders were created for panel order Extra Tubes.  Procedure                               Abnormality         Status                     ---------                               -----------         ------                     Gold Top - SST[[id number]]                                   Final result               Light Blue Top[[id number]]                                   Final result                 Please view results for these tests on the individual orders.    Light Blue Top [[id number]] Collected: 10/04/23 2103    Specimen: Blood Updated: 10/04/23 2215     Extra Tube Hold for add-ons.     Comment: Auto resulted       Comprehensive Metabolic Panel [[id number]]  (Abnormal) Collected: 10/04/23 2103    Specimen: Blood Updated: 10/04/23 2149     Glucose 364 mg/dL      BUN 30 mg/dL      Creatinine 2.40 mg/dL      Sodium 135 mmol/L      Potassium 3.4 mmol/L      Chloride 95 mmol/L      CO2 22.0 mmol/L      Calcium 8.5 mg/dL      Total Protein 7.1 g/dL      Albumin 3.6 g/dL      ALT (SGPT) 5 U/L      AST (SGOT) 16 U/L      Alkaline Phosphatase 110 U/L      Total Bilirubin 0.3 mg/dL      Globulin 3.5 gm/dL      A/G Ratio 1.0 g/dL      BUN/Creatinine Ratio 12.5     Anion Gap 18.0 mmol/L      eGFR 24.8 mL/min/1.73     Narrative:      GFR Normal >60  Chronic Kidney Disease <60  Kidney Failure <15      Magnesium [[id number]]  (Abnormal) Collected: 10/04/23 2103    Specimen: Blood Updated: 10/04/23 2140     Magnesium 1.2 mg/dL     Gold Top - SST [[id number]] Collected: 10/04/23 2103    Specimen: Blood Updated: 10/04/23 2135     Extra Tube hold    CBC & Differential [[id number]]  (Abnormal) Collected: 10/04/23 2103    Specimen: Blood Updated: 10/04/23 2119    Narrative:      The following orders were created for panel order CBC & Differential.  Procedure                               Abnormality         Status                      ---------                               -----------         ------                     CBC Auto Differential[[id number]]        Abnormal            Final result                 Please view results for these tests on the individual orders.    CBC Auto Differential [[id number]]  (Abnormal) Collected: 10/04/23 2103    Specimen: Blood Updated: 10/04/23 2119     WBC 9.00 10*3/mm3      RBC 4.38 10*6/mm3      Hemoglobin 11.6 g/dL      Hematocrit 36.0 %      MCV 82.3 fL      MCH 26.6 pg      MCHC 32.3 g/dL      RDW 14.9 %      RDW-SD 42.9 fl      MPV 7.6 fL      Platelets 416 10*3/mm3      Neutrophil % 78.1 %      Lymphocyte % 11.8 %      Monocyte % 7.3 %      Eosinophil % 2.1 %      Basophil % 0.7 %      Neutrophils, Absolute 7.00 10*3/mm3      Lymphocytes, Absolute 1.10 10*3/mm3      Monocytes, Absolute 0.70 10*3/mm3      Eosinophils, Absolute 0.20 10*3/mm3      Basophils, Absolute 0.10 10*3/mm3      nRBC 0.1 /100 WBC     POC Glucose Once [[id number]]  (Abnormal) Collected: 10/04/23 2032    Specimen: Blood Updated: 10/04/23 2033     Glucose 257 mg/dL      Comment: Serial Number: [id number]:  [id number]                I reviewed the patient's new clinical results.    Assessment & Plan       Altered mental status    Hypokalemia    Acute kidney injury    Weakness    Hypocalcemia    Hypomagnesemia  -EKG ectopic atrial rhythm rate of 100 normal axis no hypertrophy QTc of 455 this is an abnormal EKG but unchanged from 9/19/2023.   -CT head with no acute findings.   -CXR unremarkable,     Hypokalemia  -K 3.4     Acute kidney injury  -BUN 30,   -creatinine 2.4    Hypomagnesemia   -Mag 1.2    Hypocalcemia  -Calcium 8.5,     Urinary tract infection  -UA with moderate leuks.  -she was treated for UTI a couple weeks ago, sent home on Levaquin       DVT prophylaxis- SCD's  GI prophylaxis- protonix    I discussed the patient's findings and my recommendations with patient.     Meghan Lechuga, APRN  10/05/23  01:44 EDT

## 2023-10-05 NOTE — PROGRESS NOTES
LOS: 0 days   Patient Care Team:  kAash Weiner MD as PCP - General (Family Medicine)  Addi Askew MD as Consulting Physician (Nephrology)    Subjective     Interval History -improving    Patient Complaints: Confusion has resolved.  No urinary symptoms.  Complains of generalized fatigue    History taken from: patient    Review of Systems   Constitutional:  Positive for activity change and fatigue. Negative for appetite change, chills, diaphoresis and fever.   HENT:  Negative for facial swelling and hearing loss.    Eyes:  Negative for visual disturbance.   Respiratory:  Negative for cough, choking, shortness of breath, wheezing and stridor.    Cardiovascular:  Negative for chest pain, palpitations and leg swelling.   Gastrointestinal:  Negative for abdominal pain, constipation and diarrhea.   Genitourinary:  Negative for dysuria.   Musculoskeletal:  Negative for arthralgias.   Skin:  Negative for rash.   Neurological:  Negative for dizziness.   Psychiatric/Behavioral:  Negative for confusion.          Objective     Vital Signs  Temp:  [97 °F (36.1 °C)-98 °F (36.7 °C)] 98 °F (36.7 °C)  Heart Rate:  [] 91  Resp:  [12-18] 12  BP: ()/(67-95) 120/79    Physical Exam:     General Appearance:    Alert, cooperative, in no acute distress,   Head:    Normocephalic, without obvious abnormality, atraumatic   Eyes:            Lids and lashes normal, conjunctivae and sclerae normal, no   icterus, no pallor, corneas clear, PERRLA   Ears:    Ears appear intact with no abnormalities noted   Throat:   No oral lesions, no thrush, oral mucosa moist   Neck:   No adenopathy, supple, trachea midline, no thyromegaly, no   carotid bruit, no JVD   Lungs:     Clear to auscultation,respirations regular, even and                  unlabored    Heart:    Regular rhythm and normal rate, normal S1 and S2, no            murmur, no gallop, no rub, no click   Chest Wall:    No abnormalities observed   Abdomen:     Normal  bowel sounds, no masses, no organomegaly, soft        Non-tender non-distended, no guarding,   Extremities:   Moves all extremities well, no edema, no cyanosis, no             Redness   Pulses:   Pulses palpable and equal bilaterally   Skin:   No bleeding, bruising or rash   Lymph nodes:   No palpable adenopathy   Neurologic:   Cranial nerves 2 - 12 grossly intact, sensation intact, DTR       present and equal bilaterally        Results Review:    Lab Results (last 24 hours)       Procedure Component Value Units Date/Time    Eosinophil Smear - Urine, Urine, Clean Catch [[id number]] Updated: 10/05/23 1458    Specimen: Urine, Clean Catch     POC Glucose Once [[id number]]  (Abnormal) Collected: 10/05/23 1122    Specimen: Blood Updated: 10/05/23 1124     Glucose 211 mg/dL      Comment: Serial Number: [id number]:  [id number]       Magnesium [[id number]]  (Abnormal) Collected: 10/05/23 0822    Specimen: Blood Updated: 10/05/23 0920     Magnesium 3.4 mg/dL     Potassium [[id number]]  (Normal) Collected: 10/05/23 0822    Specimen: Blood Updated: 10/05/23 0916     Potassium 4.2 mmol/L     Basic Metabolic Panel [[id number]]  (Abnormal) Collected: 10/05/23 0822    Specimen: Blood Updated: 10/05/23 0916     Glucose 130 mg/dL      BUN 34 mg/dL      Creatinine 3.03 mg/dL      Sodium 138 mmol/L      Potassium 4.2 mmol/L      Chloride 101 mmol/L      CO2 22.0 mmol/L      Calcium 8.6 mg/dL      BUN/Creatinine Ratio 11.2     Anion Gap 15.0 mmol/L      eGFR 18.8 mL/min/1.73     Narrative:      GFR Normal >60  Chronic Kidney Disease <60  Kidney Failure <15      CBC & Differential [[id number]]  (Abnormal) Collected: 10/05/23 0822    Specimen: Blood Updated: 10/05/23 0904    Narrative:      The following orders were created for panel order CBC & Differential.  Procedure                               Abnormality         Status                     ---------                               -----------         ------                     CBC  Auto Differential[[id number]]        Abnormal            Final result                 Please view results for these tests on the individual orders.    CBC Auto Differential [[id number]]  (Abnormal) Collected: 10/05/23 0822    Specimen: Blood Updated: 10/05/23 0904     WBC 9.80 10*3/mm3      RBC 3.84 10*6/mm3      Hemoglobin 10.2 g/dL      Hematocrit 31.8 %      MCV 83.0 fL      MCH 26.7 pg      MCHC 32.2 g/dL      RDW 15.0 %      RDW-SD 44.2 fl      MPV 7.5 fL      Platelets 394 10*3/mm3      Neutrophil % 80.5 %      Lymphocyte % 8.9 %      Monocyte % 8.2 %      Eosinophil % 1.7 %      Basophil % 0.7 %      Neutrophils, Absolute 7.90 10*3/mm3      Lymphocytes, Absolute 0.90 10*3/mm3      Monocytes, Absolute 0.80 10*3/mm3      Eosinophils, Absolute 0.20 10*3/mm3      Basophils, Absolute 0.10 10*3/mm3      nRBC 0.0 /100 WBC     POC Glucose Once [[id number]]  (Abnormal) Collected: 10/05/23 0804    Specimen: Blood Updated: 10/05/23 0807     Glucose 118 mg/dL      Comment: Serial Number: [id number]:  [id number]       POC Glucose Once [[id number]]  (Abnormal) Collected: 10/04/23 2339    Specimen: Blood Updated: 10/04/23 2340     Glucose 302 mg/dL      Comment: Serial Number: [id number]:  [id number]       Urine Culture - Urine, Urine, Clean Catch [[id number]] Collected: 10/04/23 2213    Specimen: Urine, Clean Catch Updated: 10/04/23 2313    Urinalysis, Microscopic Only - Urine, Clean Catch [[id number]]  (Abnormal) Collected: 10/04/23 2213    Specimen: Urine, Clean Catch Updated: 10/04/23 2249     RBC, UA 0-2 /HPF      WBC, UA Too Numerous to Count /HPF      Bacteria, UA 2+ /HPF      Squamous Epithelial Cells, UA 13-20 /HPF      Yeast, UA Large/3+ Budding Yeast /HPF      Hyaline Casts, UA 3-6 /LPF      Granular Casts, UA 3-6 /LPF      Amorphous Urate Crystals, UA Moderate/2+ /HPF      Methodology Manual Light Microscopy    Urinalysis With Microscopic If Indicated (No Culture) - Urine, Clean Catch [[id number]]   (Abnormal) Collected: 10/04/23 2213    Specimen: Urine, Clean Catch Updated: 10/04/23 2227     Color, UA Yellow     Appearance, UA Turbid     pH, UA <=5.0     Specific Gravity, UA 1.017     Glucose, UA Negative     Ketones, UA Trace     Bilirubin, UA Negative     Blood, UA Negative     Protein,  mg/dL (2+)     Leuk Esterase, UA Moderate (2+)     Nitrite, UA Negative     Urobilinogen, UA 1.0 E.U./dL    Extra Tubes [[id number]] Collected: 10/04/23 2103    Specimen: Blood, Venous Line Updated: 10/04/23 2215    Narrative:      The following orders were created for panel order Extra Tubes.  Procedure                               Abnormality         Status                     ---------                               -----------         ------                     Gold Top - SST[[id number]]                                   Final result               Light Blue Top[[id number]]                                   Final result                 Please view results for these tests on the individual orders.    Light Blue Top [[id number]] Collected: 10/04/23 2103    Specimen: Blood Updated: 10/04/23 2215     Extra Tube Hold for add-ons.     Comment: Auto resulted       Comprehensive Metabolic Panel [[id number]]  (Abnormal) Collected: 10/04/23 2103    Specimen: Blood Updated: 10/04/23 2149     Glucose 364 mg/dL      BUN 30 mg/dL      Creatinine 2.40 mg/dL      Sodium 135 mmol/L      Potassium 3.4 mmol/L      Chloride 95 mmol/L      CO2 22.0 mmol/L      Calcium 8.5 mg/dL      Total Protein 7.1 g/dL      Albumin 3.6 g/dL      ALT (SGPT) 5 U/L      AST (SGOT) 16 U/L      Alkaline Phosphatase 110 U/L      Total Bilirubin 0.3 mg/dL      Globulin 3.5 gm/dL      A/G Ratio 1.0 g/dL      BUN/Creatinine Ratio 12.5     Anion Gap 18.0 mmol/L      eGFR 24.8 mL/min/1.73     Narrative:      GFR Normal >60  Chronic Kidney Disease <60  Kidney Failure <15      Magnesium [[id number]]  (Abnormal) Collected: 10/04/23 2103    Specimen: Blood Updated:  10/04/23 2140     Magnesium 1.2 mg/dL     Gold Top - SST [[id number]] Collected: 10/04/23 2103    Specimen: Blood Updated: 10/04/23 2135     Extra Tube hold    CBC & Differential [[id number]]  (Abnormal) Collected: 10/04/23 2103    Specimen: Blood Updated: 10/04/23 2119    Narrative:      The following orders were created for panel order CBC & Differential.  Procedure                               Abnormality         Status                     ---------                               -----------         ------                     CBC Auto Differential[[id number]]        Abnormal            Final result                 Please view results for these tests on the individual orders.    CBC Auto Differential [[id number]]  (Abnormal) Collected: 10/04/23 2103    Specimen: Blood Updated: 10/04/23 2119     WBC 9.00 10*3/mm3      RBC 4.38 10*6/mm3      Hemoglobin 11.6 g/dL      Hematocrit 36.0 %      MCV 82.3 fL      MCH 26.6 pg      MCHC 32.3 g/dL      RDW 14.9 %      RDW-SD 42.9 fl      MPV 7.6 fL      Platelets 416 10*3/mm3      Neutrophil % 78.1 %      Lymphocyte % 11.8 %      Monocyte % 7.3 %      Eosinophil % 2.1 %      Basophil % 0.7 %      Neutrophils, Absolute 7.00 10*3/mm3      Lymphocytes, Absolute 1.10 10*3/mm3      Monocytes, Absolute 0.70 10*3/mm3      Eosinophils, Absolute 0.20 10*3/mm3      Basophils, Absolute 0.10 10*3/mm3      nRBC 0.1 /100 WBC     POC Glucose Once [[id number]]  (Abnormal) Collected: 10/04/23 2032    Specimen: Blood Updated: 10/04/23 2033     Glucose 257 mg/dL      Comment: Serial Number: [id number]:  [id number]                Imaging Results (Last 24 Hours)       Procedure Component Value Units Date/Time    US Renal Bilateral [[id number]] Collected: 10/05/23 1434     Updated: 10/05/23 1438    Narrative:      US RENAL BILATERAL    Date of Exam: 10/5/2023 2:13 PM EDT    Indication: acute kidney injury.    Comparison: CT abdomen/pelvis 9/18/2023    Technique: Grayscale and color Doppler  ultrasound evaluation of the kidneys and urinary bladder was performed.      Findings:  Right kidney measures 14.4 cm. Left kidney measures 12.8 cm. Echogenicity appears normal. There is mild right hydronephrosis. Urinary bladder is devoid of urine      Impression:      Impression:  Mild right hydronephrosis. Consider CT to evaluate for persistent obstructing ureteral calculus        Electronically Signed: Akash Del Rio    10/5/2023 2:36 PM EDT    Workstation ID: [id number]    CT Head Without Contrast [[id number]] Collected: 10/04/23 2155     Updated: 10/04/23 2158    Narrative:      CT HEAD WO CONTRAST    Date of Exam: 10/4/2023 9:30 PM EDT    Indication: Ataxia speech difficulty weakness.    Comparison: None available.    Technique: Axial CT images were obtained of the head without contrast administration.  Coronal reconstructions were performed.  Automated exposure control and iterative reconstruction methods were used.      Findings:      The ventricles and subarachnoid spaces are unremarkable.    There is no intracranial hemorrhage.    There is no evidence of resent large arterial distribution infarct.    There is no edema or mass effect. No midline shift.    Limited evaluation of the orbits is unremarkable    The visualized paranasal sinuses are unremarkable..    Evaluation of the osseous structure at the appropriate bones window is unremarkable.      Impression:      Impression:    No acute intracranial pathology.              Electronically Signed: Cleveland Bruce MD    10/4/2023 9:56 PM EDT    Workstation ID: [id number]    XR Chest 1 View [[id number]] Collected: 10/04/23 2141     Updated: 10/04/23 2144    Narrative:      XR CHEST 1 VW    Date of Exam: 10/4/2023 9:19 PM EDT    Indication: Altered mental status    Comparison: None available.    Findings:         There is no acute infiltrate.     There is no large pleural effusion.    The cardiac silhouette is unremarkable.    The visualized reinaldo structures  demonstrate no abnormality.        Impression:      Impression:    No acute pulmonary disease.      Electronically Signed: Cleveland Bruce MD    10/4/2023 9:42 PM EDT    Workstation ID: [id number]                 I reviewed the patient's new clinical results.    Medication Review:   Scheduled Meds:insulin glargine, 25 Units, Subcutaneous, Nightly  insulin lispro, 2-9 Units, Subcutaneous, 4x Daily AC & at Bedtime  pantoprazole, 40 mg, Intravenous, Q AM  rosuvastatin, 20 mg, Oral, Daily  senna-docusate sodium, 2 tablet, Oral, BID  sodium chloride, 10 mL, Intravenous, Q12H      Continuous Infusions:sodium chloride, 125 mL/hr, Last Rate: 125 mL/hr (10/05/23 0537)      PRN Meds:.  acetaminophen    senna-docusate sodium **AND** polyethylene glycol **AND** bisacodyl **AND** bisacodyl    Calcium Replacement - Follow Nurse / BPA Driven Protocol    dextrose    dextrose    glucagon (human recombinant)    hydrALAZINE    Magnesium Standard Dose Replacement - Follow Nurse / BPA Driven Protocol    melatonin    ondansetron **OR** ondansetron    Phosphorus Replacement - Follow Nurse / BPA Driven Protocol    Potassium Replacement - Follow Nurse / BPA Driven Protocol    [COMPLETED] Insert Peripheral IV **AND** sodium chloride    sodium chloride    sodium chloride     Assessment & Plan       Altered mental status    Hypokalemia    Acute kidney injury    Weakness    Hypocalcemia    Hypomagnesemia    JEROD (acute kidney injury)  History of kidney stones  Recent UTI    Recent UTI grew Pseudomonas and required levofloxacin.  Urine sample collected in our office yesterday was clear.  Sample collected in the ER last night is almost certainly contaminated with 13-20 squamous epithelial cells.  She has no urinary tract infection symptoms.  I am not continuing antibiotics at this point.  For her renal function we are starting IV fluids, checking a renal ultrasound and asking nephrology to evaluate.  Etiology of acute kidney injury is most likely  due to recent of Levaquin, metformin and NSAIDs use.    We are holding metformin, NSAID's, stimulant.    Hypomagnesemia has been corrected.    SCD'd for dvt prophy  Pantoprazole for stress ulcer prophy      Plan for disposition:Home at discharge    Micaela Isabel MD  10/05/23  16:10 EDT

## 2023-10-05 NOTE — CONSULTS
NEPHROLOGY CONSULTATION-----[facility]    [facility]  [phone]  Addi Askew MD    Patient Care Team:  Akash Weiner MD as PCP - General (Family Medicine)  Addi Askew MD as Consulting Physician (Nephrology)    CC/REASON FOR CONSULTATION: Elevated creatinine    PHYSICIAN REQUESTING CONSULTATION: Dr. Isabel    History of Present Illness    43-year-old female with past medical history of diabetes presented on 10/4/2023 with complaints of dizziness, difficulty walking and some confusion.  She was recently in hospital and treated for UTI, that was associated with hydronephrosis, status post stent placement.  She was on Levaquin.  Her creatinine was normal at 0.6 at the time of discharge on 9/20/2023.  Creatinine was 2.4 on admission now 3 today.  Denies any vomiting or diarrhea.  No chest pain or shortness of breath.  UA suggestive of UTI with 2+ bacteria.Renal ultrasound was done showing persistent right-sided hydronephrosis.  She had been taking Aleve recently.    Review of Systems   As noted above otherwise 10 systems reviewed were negative.    Past Medical History:   Diagnosis Date    DDD (degenerative disc disease), cervical     Diabetes mellitus     PCOS (polycystic ovarian syndrome)     Porphyria        Past Surgical History:   Procedure Laterality Date    CERVICAL SPINE SURGERY      CYSTOSCOPY, URETEROSCOPY, RETROGRADE PYELOGRAM, STENT INSERTION Right 9/20/2023    Procedure: CYSTOSCOPY URETEROSCOPY STONE BASKET EXTRACTION HOLMIUM LASER STENT INSERTION;  Surgeon: Cory Phelps MD;  Location: [facility] MAIN OR;  Service: Urology;  Laterality: Right;       Family History   Problem Relation Age of Onset    No Known Problems Mother     No Known Problems Father     No Known Problems Sister     No Known Problems Brother     No Known Problems Maternal Aunt     No Known Problems Maternal Uncle     No Known Problems Paternal Aunt     No Known  Problems Paternal Uncle     No Known Problems Maternal Grandmother     No Known Problems Maternal Grandfather     No Known Problems Paternal Grandmother     No Known Problems Paternal Grandfather     No Known Problems Other     Anemia Neg Hx     Arrhythmia Neg Hx     Asthma Neg Hx     Clotting disorder Neg Hx     Fainting Neg Hx     Heart attack Neg Hx     Heart disease Neg Hx     Heart failure Neg Hx     Hyperlipidemia Neg Hx     Hypertension Neg Hx        Social History     Tobacco Use    Smoking status: Former     Types: Electronic Cigarette    Smokeless tobacco: Never   Vaping Use    Vaping Use: Former   Substance Use Topics    Alcohol use: Yes     Comment: occassionally    Drug use: Never       Home Meds:   Medications Prior to Admission   Medication Sig Dispense Refill Last Dose    acetaminophen (TYLENOL) 325 MG tablet Take 2 tablets by mouth Every 4 (Four) Hours As Needed for Mild Pain.       cyclobenzaprine (FLEXERIL) 10 MG tablet 1 tablet 3 (Three) Times a Day As Needed.       gabapentin (NEURONTIN) 100 MG capsule 2 capsules 3 (Three) Times a Day.       insulin glargine (LANTUS, SEMGLEE) 100 UNIT/ML injection Inject 10 Units under the skin into the appropriate area as directed Every Night. (Patient taking differently: Inject 25 Units under the skin into the appropriate area as directed Every Night.)  12     metFORMIN (GLUCOPHAGE) 1000 MG tablet Take 1 tablet by mouth 2 (Two) Times a Day With Meals. 60 tablet 5     methylphenidate 27 MG CR tablet Take 2 tablets by mouth Every Morning       nabumetone (RELAFEN) 750 MG tablet 2 tablets Every Night.       naproxen sodium (ALEVE) 220 MG tablet Take 1 tablet by mouth 2 (Two) Times a Day As Needed.       rosuvastatin (CRESTOR) 20 MG tablet Take 1 tablet by mouth Daily.       Tirzepatide (Mounjaro) 5 MG/0.5ML solution pen-injector Inject  under the skin into the appropriate area as directed 1 (One) Time Per Week. (Patient not taking: Reported on 10/5/2023)   Not  Taking    TROKENDI  MG capsule sustained-release 24 hr           Scheduled Meds:  insulin lispro, 2-9 Units, Subcutaneous, 4x Daily AC & at Bedtime  pantoprazole, 40 mg, Intravenous, Q AM  rosuvastatin, 20 mg, Oral, Daily  senna-docusate sodium, 2 tablet, Oral, BID  sodium chloride, 10 mL, Intravenous, Q12H        Continuous Infusions:  sodium chloride, 125 mL/hr        PRN Meds:    acetaminophen    senna-docusate sodium **AND** polyethylene glycol **AND** bisacodyl **AND** bisacodyl    Calcium Replacement - Follow Nurse / BPA Driven Protocol    dextrose    dextrose    glucagon (human recombinant)    hydrALAZINE    Magnesium Standard Dose Replacement - Follow Nurse / BPA Driven Protocol    melatonin    ondansetron **OR** ondansetron    Phosphorus Replacement - Follow Nurse / BPA Driven Protocol    Potassium Replacement - Follow Nurse / BPA Driven Protocol    [COMPLETED] Insert Peripheral IV **AND** sodium chloride    sodium chloride    sodium chloride    Allergies:  Adhesive tape, Cephalosporins, and Lisinopril    OBJECTIVE    Vital Signs  Temp:  [97 °F (36.1 °C)-97.5 °F (36.4 °C)] 97.5 °F (36.4 °C)  Heart Rate:  [] 93  Resp:  [14-18] 14  BP: ()/(67-95) 115/73    I/O this shift:  In: 240 [P.O.:240]  Out: -   No intake/output data recorded.    Physical Exam:  General Appearance: alert, appears stated age and cooperative  Head: normocephalic, without obvious abnormality and atraumatic  Eyes: conjunctivae and sclerae normal and no icterus  Neck: supple and no JVD  Lungs: clear to auscultation and respirations regular  Heart: regular rhythm & normal rate and normal S1, S2  Chest Wall: no abnormalities observed  Abdomen: normal bowel sounds and soft, nontender  Extremities: moves extremities well, no edema, no cyanosis  Skin: no bleeding, bruising or rash  Neurologic: Alert, and oriented. No focal deficits    Results Review:    I reviewed the patient's new clinical results.    WBC WBC   Date Value Ref  Range Status   10/05/2023 9.80 3.40 - 10.80 10*3/mm3 Final   10/04/2023 9.00 3.40 - 10.80 10*3/mm3 Final      HGB Hemoglobin   Date Value Ref Range Status   10/05/2023 10.2 (L) 12.0 - 15.9 g/dL Final   10/04/2023 11.6 (L) 12.0 - 15.9 g/dL Final      HCT Hematocrit   Date Value Ref Range Status   10/05/2023 31.8 (L) 34.0 - 46.6 % Final   10/04/2023 36.0 34.0 - 46.6 % Final      Platelets No results found for: LABPLAT   MCV MCV   Date Value Ref Range Status   10/05/2023 83.0 79.0 - 97.0 fL Final   10/04/2023 82.3 79.0 - 97.0 fL Final          Sodium Sodium   Date Value Ref Range Status   10/05/2023 138 136 - 145 mmol/L Final   10/04/2023 135 (L) 136 - 145 mmol/L Final      Potassium Potassium   Date Value Ref Range Status   10/05/2023 4.2 3.5 - 5.2 mmol/L Final   10/05/2023 4.2 3.5 - 5.2 mmol/L Final   10/04/2023 3.4 (L) 3.5 - 5.2 mmol/L Final      Chloride Chloride   Date Value Ref Range Status   10/05/2023 101 98 - 107 mmol/L Final   10/04/2023 95 (L) 98 - 107 mmol/L Final      CO2 CO2   Date Value Ref Range Status   10/05/2023 22.0 22.0 - 29.0 mmol/L Final   10/04/2023 22.0 22.0 - 29.0 mmol/L Final      BUN BUN   Date Value Ref Range Status   10/05/2023 34 (H) 6 - 20 mg/dL Final   10/04/2023 30 (H) 6 - 20 mg/dL Final      Creatinine Creatinine   Date Value Ref Range Status   10/05/2023 3.03 (H) 0.57 - 1.00 mg/dL Final   10/04/2023 2.40 (H) 0.57 - 1.00 mg/dL Final      Calcium Calcium   Date Value Ref Range Status   10/05/2023 8.6 8.6 - 10.5 mg/dL Final   10/04/2023 8.5 (L) 8.6 - 10.5 mg/dL Final      PO4 No results found for: CAPO4   Albumin Albumin   Date Value Ref Range Status   10/04/2023 3.6 3.5 - 5.2 g/dL Final      Magnesium Magnesium   Date Value Ref Range Status   10/05/2023 3.4 (H) 1.6 - 2.6 mg/dL Final   10/04/2023 1.2 (L) 1.6 - 2.6 mg/dL Final      Uric Acid No results found for: URICACID       Imaging Results (Last 72 Hours)       Procedure Component Value Units Date/Time    US Renal Bilateral  [[id number]] Resulted: 10/05/23 1413     Updated: 10/05/23 1413    CT Head Without Contrast [[id number]] Collected: 10/04/23 2155     Updated: 10/04/23 2158    Narrative:      CT HEAD WO CONTRAST    Date of Exam: 10/4/2023 9:30 PM EDT    Indication: Ataxia speech difficulty weakness.    Comparison: None available.    Technique: Axial CT images were obtained of the head without contrast administration.  Coronal reconstructions were performed.  Automated exposure control and iterative reconstruction methods were used.      Findings:      The ventricles and subarachnoid spaces are unremarkable.    There is no intracranial hemorrhage.    There is no evidence of resent large arterial distribution infarct.    There is no edema or mass effect. No midline shift.    Limited evaluation of the orbits is unremarkable    The visualized paranasal sinuses are unremarkable..    Evaluation of the osseous structure at the appropriate bones window is unremarkable.      Impression:      Impression:    No acute intracranial pathology.              Electronically Signed: Cleveland Bruce MD    10/4/2023 9:56 PM EDT    Workstation ID: [id number]    XR Chest 1 View [[id number]] Collected: 10/04/23 2141     Updated: 10/04/23 2144    Narrative:      XR CHEST 1 VW    Date of Exam: 10/4/2023 9:19 PM EDT    Indication: Altered mental status    Comparison: None available.    Findings:         There is no acute infiltrate.     There is no large pleural effusion.    The cardiac silhouette is unremarkable.    The visualized reinaldo structures demonstrate no abnormality.        Impression:      Impression:    No acute pulmonary disease.      Electronically Signed: Cleveland Bruce MD    10/4/2023 9:42 PM EDT    Workstation ID: [id number]              Results for orders placed during the hospital encounter of 10/04/23    XR Chest 1 View    Narrative  XR CHEST 1 VW    Date of Exam: 10/4/2023 9:19 PM EDT    Indication: Altered mental status    Comparison: None  available.    Findings:        There is no acute infiltrate.    There is no large pleural effusion.    The cardiac silhouette is unremarkable.    The visualized reinaldo structures demonstrate no abnormality.    Impression  Impression:    No acute pulmonary disease.      Electronically Signed: Cleveland Bruce MD  10/4/2023 9:42 PM EDT  Workstation ID: [id number]            ASSESSMENT / PLAN      Altered mental status    Hypokalemia    Acute kidney injury    Weakness    Hypocalcemia    Hypomagnesemia    JEROD--Suspect AIN related to recent quinolone use and/or NSAIDs.. Check urine for eos.  Although she has mild right hydro, I doubt this is contributing to her JEROD.  Nevertheless will have urology reconsult on her  UTI-recent UTI with Pseudomonas.  Treated.  Right-sided hydronephrosis-recent stone removal and stent placement.  Now stent is removed.  Altered mental status-resolved  Hypomagnesemia-replaced.    Agree with IV fluids  Check urine for eosinophils  If indicated may need short-term treatment with steroids.  Avoid NSAIDs  We will have urology see her again in a.m. May need another CT scan        I discussed the patient's findings and my recommendations with patient, family, and consulting provider    Will follow along closely. Thank you for allowing us to see this patient in renal consultation.    [facility]  [phone]  MD Addi Leo MD  10/05/23  14:15 EDT

## 2023-10-06 ENCOUNTER — APPOINTMENT (OUTPATIENT)
Dept: CT IMAGING | Facility: HOSPITAL | Age: 43
DRG: 684 | End: 2023-10-06
Payer: COMMERCIAL

## 2023-10-06 LAB
ALBUMIN SERPL-MCNC: 2.9 G/DL (ref 3.5–5.2)
ANION GAP SERPL CALCULATED.3IONS-SCNC: 11 MMOL/L (ref 5–15)
BACTERIA SPEC AEROBE CULT: NORMAL
BUN SERPL-MCNC: 38 MG/DL (ref 6–20)
BUN/CREAT SERPL: 11.3 (ref 7–25)
CALCIUM SPEC-SCNC: 8.1 MG/DL (ref 8.6–10.5)
CHLORIDE SERPL-SCNC: 103 MMOL/L (ref 98–107)
CK SERPL-CCNC: 38 U/L (ref 20–180)
CO2 SERPL-SCNC: 20 MMOL/L (ref 22–29)
CREAT SERPL-MCNC: 3.36 MG/DL (ref 0.57–1)
EGFRCR SERPLBLD CKD-EPI 2021: 16.6 ML/MIN/1.73
GLUCOSE BLDC GLUCOMTR-MCNC: 195 MG/DL (ref 70–105)
GLUCOSE BLDC GLUCOMTR-MCNC: 208 MG/DL (ref 70–105)
GLUCOSE BLDC GLUCOMTR-MCNC: 217 MG/DL (ref 70–105)
GLUCOSE BLDC GLUCOMTR-MCNC: 279 MG/DL (ref 70–105)
GLUCOSE SERPL-MCNC: 248 MG/DL (ref 65–99)
MAGNESIUM SERPL-MCNC: 2.5 MG/DL (ref 1.6–2.6)
PHOSPHATE SERPL-MCNC: 3.1 MG/DL (ref 2.5–4.5)
POTASSIUM SERPL-SCNC: 4.8 MMOL/L (ref 3.5–5.2)
SODIUM SERPL-SCNC: 134 MMOL/L (ref 136–145)
URATE SERPL-MCNC: 5.5 MG/DL (ref 2.4–5.7)

## 2023-10-06 PROCEDURE — 63710000001 INSULIN LISPRO (HUMAN) PER 5 UNITS

## 2023-10-06 PROCEDURE — 82948 REAGENT STRIP/BLOOD GLUCOSE: CPT

## 2023-10-06 PROCEDURE — 63710000001 INSULIN GLARGINE PER 5 UNITS: Performed by: INTERNAL MEDICINE

## 2023-10-06 PROCEDURE — 74176 CT ABD & PELVIS W/O CONTRAST: CPT

## 2023-10-06 PROCEDURE — 25810000003 SODIUM CHLORIDE 0.9 % SOLUTION: Performed by: INTERNAL MEDICINE

## 2023-10-06 RX ORDER — SODIUM BICARBONATE 650 MG/1
1300 TABLET ORAL 3 TIMES DAILY
Status: DISCONTINUED | OUTPATIENT
Start: 2023-10-06 | End: 2023-10-07 | Stop reason: HOSPADM

## 2023-10-06 RX ORDER — TRAMADOL HYDROCHLORIDE 50 MG/1
50 TABLET ORAL EVERY 8 HOURS PRN
Status: DISCONTINUED | OUTPATIENT
Start: 2023-10-06 | End: 2023-10-07

## 2023-10-06 RX ORDER — METHOCARBAMOL 500 MG/1
500 TABLET, FILM COATED ORAL EVERY 8 HOURS PRN
Status: DISCONTINUED | OUTPATIENT
Start: 2023-10-06 | End: 2023-10-07 | Stop reason: HOSPADM

## 2023-10-06 RX ORDER — FLUCONAZOLE 100 MG/1
100 TABLET ORAL
Status: DISCONTINUED | OUTPATIENT
Start: 2023-10-06 | End: 2023-10-07 | Stop reason: HOSPADM

## 2023-10-06 RX ORDER — GABAPENTIN 300 MG/1
300 CAPSULE ORAL 3 TIMES DAILY
Status: DISCONTINUED | OUTPATIENT
Start: 2023-10-06 | End: 2023-10-07 | Stop reason: HOSPADM

## 2023-10-06 RX ADMIN — SODIUM BICARBONATE 1300 MG: 650 TABLET ORAL at 20:33

## 2023-10-06 RX ADMIN — GABAPENTIN 300 MG: 300 CAPSULE ORAL at 17:17

## 2023-10-06 RX ADMIN — PANTOPRAZOLE SODIUM 40 MG: 40 TABLET, DELAYED RELEASE ORAL at 08:18

## 2023-10-06 RX ADMIN — SODIUM CHLORIDE 125 ML/HR: 9 INJECTION, SOLUTION INTRAVENOUS at 08:17

## 2023-10-06 RX ADMIN — TOPIRAMATE 50 MG: 25 TABLET, FILM COATED ORAL at 08:18

## 2023-10-06 RX ADMIN — TOPIRAMATE 50 MG: 25 TABLET, FILM COATED ORAL at 20:33

## 2023-10-06 RX ADMIN — INSULIN LISPRO 4 UNITS: 100 INJECTION, SOLUTION INTRAVENOUS; SUBCUTANEOUS at 08:17

## 2023-10-06 RX ADMIN — Medication 10 ML: at 08:17

## 2023-10-06 RX ADMIN — INSULIN LISPRO 6 UNITS: 100 INJECTION, SOLUTION INTRAVENOUS; SUBCUTANEOUS at 20:33

## 2023-10-06 RX ADMIN — INSULIN LISPRO 4 UNITS: 100 INJECTION, SOLUTION INTRAVENOUS; SUBCUTANEOUS at 17:17

## 2023-10-06 RX ADMIN — FLUCONAZOLE 100 MG: 100 TABLET ORAL at 11:59

## 2023-10-06 RX ADMIN — INSULIN LISPRO 2 UNITS: 100 INJECTION, SOLUTION INTRAVENOUS; SUBCUTANEOUS at 11:59

## 2023-10-06 RX ADMIN — INSULIN GLARGINE 25 UNITS: 100 INJECTION, SOLUTION SUBCUTANEOUS at 20:33

## 2023-10-06 RX ADMIN — SODIUM BICARBONATE 1300 MG: 650 TABLET ORAL at 17:17

## 2023-10-06 RX ADMIN — GABAPENTIN 200 MG: 100 CAPSULE ORAL at 08:17

## 2023-10-06 RX ADMIN — SENNOSIDES AND DOCUSATE SODIUM 2 TABLET: 50; 8.6 TABLET ORAL at 20:33

## 2023-10-06 RX ADMIN — SENNOSIDES AND DOCUSATE SODIUM 2 TABLET: 50; 8.6 TABLET ORAL at 08:17

## 2023-10-06 RX ADMIN — ROSUVASTATIN 20 MG: 10 TABLET, FILM COATED ORAL at 08:18

## 2023-10-06 RX ADMIN — GABAPENTIN 300 MG: 300 CAPSULE ORAL at 20:33

## 2023-10-06 NOTE — PLAN OF CARE
Goal Outcome Evaluation:              Outcome Evaluation: Sudhakar is A&OX4, has had minimal complaints throughout shift. Patient stated that she passed a kidney stone this morning; stone is in a urine specimen cup at bedside. Patient has family visiting at bedside. Plan to DC home when medically stablle.

## 2023-10-06 NOTE — CASE MANAGEMENT/SOCIAL WORK
Continued Stay Note  ASTRID Leon     Patient Name: Nora Tam  MRN: [id number]  Today's Date: 10/6/2023    Admit Date: 10/4/2023    Plan: Home at discharge.   Discharge Plan       Row Name 10/06/23 1555       Plan    Plan Comments DC barriers: Renal and Urology following. Continuing to monitor Creatinine levels.             Megan Naegele, RN     Office Phone: [phone]  Office Cell: [phone]

## 2023-10-06 NOTE — CONSULTS
[facility] CONSULT      Patient Identification:  NAME:  Nora Tam  Age:  43 y.o.   Sex:  female   :  1980   MRN:  [id number]       Chief complaint/Reason for consult: Passed a stone    History of present illness:  43 y.o. female recently had right ureteroscopy laser lithotripsy and stent placement, subsequently had stent removed.  Now admitted with altered mental status and JEROD.  She passed a kidney stone this morning so we are consulted.  CT ordered showing no residual right-sided stones with mild hydronephrosis and left-sided nonobstructing stones      Past medical history:  Past Medical History:   Diagnosis Date    DDD (degenerative disc disease), cervical     Diabetes mellitus     PCOS (polycystic ovarian syndrome)     Porphyria        Past surgical history:  Past Surgical History:   Procedure Laterality Date    CERVICAL SPINE SURGERY      CYSTOSCOPY, URETEROSCOPY, RETROGRADE PYELOGRAM, STENT INSERTION Right 2023    Procedure: CYSTOSCOPY URETEROSCOPY STONE BASKET EXTRACTION HOLMIUM LASER STENT INSERTION;  Surgeon: Cory Phelps MD;  Location: [facility] OR;  Service: Urology;  Laterality: Right;       Allergies:  Adhesive tape, Cephalosporins, and Lisinopril    Home medications:  Medications Prior to Admission   Medication Sig Dispense Refill Last Dose    acetaminophen (TYLENOL) 325 MG tablet Take 2 tablets by mouth Every 4 (Four) Hours As Needed for Mild Pain.       cyclobenzaprine (FLEXERIL) 10 MG tablet Take 1 tablet by mouth At Night As Needed.       gabapentin (NEURONTIN) 100 MG capsule Take 2 capsules by mouth 3 (Three) Times a Day.       Insulin Glargine (LANTUS SOLOSTAR) 100 UNIT/ML injection pen Inject 25 Units under the skin into the appropriate area as directed Every Night.       metFORMIN (GLUCOPHAGE) 1000 MG tablet Take 1 tablet by mouth 2 (Two) Times a Day With Meals. 60 tablet 5     methylphenidate 54 MG CR tablet Take 1 tablet by mouth Every Morning       nabumetone  (RELAFEN) 750 MG tablet Take 2 tablets by mouth Every Night.       naproxen sodium (ALEVE) 220 MG tablet Take 1 tablet by mouth 2 (Two) Times a Day As Needed.       rosuvastatin (CRESTOR) 20 MG tablet Take 1 tablet by mouth Daily.       Tirzepatide (Mounjaro) 5 MG/0.5ML solution pen-injector Inject 0.5 mL under the skin into the appropriate area as directed 1 (One) Time Per Week.       TROKENDI  MG capsule sustained-release 24 hr Take 1 capsule by mouth Daily.           Hospital medications:  gabapentin, 200 mg, Oral, TID  insulin glargine, 25 Units, Subcutaneous, Nightly  insulin lispro, 2-9 Units, Subcutaneous, 4x Daily AC & at Bedtime  pantoprazole, 40 mg, Oral, Daily  rosuvastatin, 20 mg, Oral, Daily  senna-docusate sodium, 2 tablet, Oral, BID  sodium chloride, 10 mL, Intravenous, Q12H  topiramate, 50 mg, Oral, Q12H      sodium chloride, 125 mL/hr, Last Rate: 125 mL/hr (10/06/23 0817)        acetaminophen    senna-docusate sodium **AND** polyethylene glycol **AND** bisacodyl **AND** bisacodyl    Calcium Replacement - Follow Nurse / BPA Driven Protocol    cyclobenzaprine    dextrose    dextrose    glucagon (human recombinant)    hydrALAZINE    Magnesium Standard Dose Replacement - Follow Nurse / BPA Driven Protocol    melatonin    ondansetron **OR** ondansetron    Phosphorus Replacement - Follow Nurse / BPA Driven Protocol    Potassium Replacement - Follow Nurse / BPA Driven Protocol    [COMPLETED] Insert Peripheral IV **AND** sodium chloride    sodium chloride    sodium chloride    Family history:  Family History   Problem Relation Age of Onset    No Known Problems Mother     No Known Problems Father     No Known Problems Sister     No Known Problems Brother     No Known Problems Maternal Aunt     No Known Problems Maternal Uncle     No Known Problems Paternal Aunt     No Known Problems Paternal Uncle     No Known Problems Maternal Grandmother     No Known Problems Maternal Grandfather     No Known  Problems Paternal Grandmother     No Known Problems Paternal Grandfather     No Known Problems Other     Anemia Neg Hx     Arrhythmia Neg Hx     Asthma Neg Hx     Clotting disorder Neg Hx     Fainting Neg Hx     Heart attack Neg Hx     Heart disease Neg Hx     Heart failure Neg Hx     Hyperlipidemia Neg Hx     Hypertension Neg Hx        Social history:  Social History     Tobacco Use    Smoking status: Former     Types: Electronic Cigarette    Smokeless tobacco: Never   Vaping Use    Vaping Use: Former   Substance Use Topics    Alcohol use: Yes     Comment: occassionally    Drug use: Never           Objective:  TMax 24 hours:   Temp (24hrs), Av.9 °F (36.6 °C), Min:97.5 °F (36.4 °C), Max:98.1 °F (36.7 °C)      Vitals Ranges:   Temp:  [97.5 °F (36.4 °C)-98.1 °F (36.7 °C)] 98.1 °F (36.7 °C)  Heart Rate:  [] 85  Resp:  [12-20] 13  BP: ()/(60-83) 115/83    Intake/Output Last 3 shifts:  I/O last 3 completed shifts:  In: 480 [P.O.:480]  Out: -      Physical Exam:    General Appearance:    Alert, cooperative, NAD   HEENT:    No trauma, hearing intact   Lungs:     Respirations unlabored, no audible wheezing    Heart:    No cyanosis, No significant edema   Abdomen:     Soft, ND    :    No suprapubic distention or tenderness                       Results review:   I reviewed the patient's new clinical results.    Data review:  Lab Results (last 24 hours)       Procedure Component Value Units Date/Time    Urine Culture - Urine, Urine, Clean Catch [[id number]] Collected: 10/04/23 2213    Specimen: Urine, Clean Catch Updated: 10/06/23 1018     Urine Culture <25,000 CFU/mL Normal Urogenital Adriana    Narrative:      Colonization of the urinary tract without infection is common. Treatment is discouraged unless the patient is symptomatic, pregnant, or undergoing an invasive urologic procedure.    POC Glucose Once [[id number]]  (Abnormal) Collected: 10/06/23 0732    Specimen: Blood Updated: 10/06/23 0734     Glucose  208 mg/dL      Comment: Serial Number: [id number]:  [id number]       Magnesium [[id number]]  (Normal) Collected: 10/05/23 2258    Specimen: Blood Updated: 10/06/23 0005     Magnesium 2.5 mg/dL     Renal Function Panel [[id number]]  (Abnormal) Collected: 10/05/23 2258    Specimen: Blood Updated: 10/06/23 0005     Glucose 248 mg/dL      BUN 38 mg/dL      Creatinine 3.36 mg/dL      Sodium 134 mmol/L      Potassium 4.8 mmol/L      Chloride 103 mmol/L      CO2 20.0 mmol/L      Calcium 8.1 mg/dL      Albumin 2.9 g/dL      Phosphorus 3.1 mg/dL      Anion Gap 11.0 mmol/L      BUN/Creatinine Ratio 11.3     eGFR 16.6 mL/min/1.73     Narrative:      GFR Normal >60  Chronic Kidney Disease <60  Kidney Failure <15      Uric Acid [[id number]]  (Normal) Collected: 10/05/23 2258    Specimen: Blood Updated: 10/06/23 0005     Uric Acid 5.5 mg/dL     Eosinophil Smear - Urine, Urine, Clean Catch [[id number]]  (Normal) Collected: 10/05/23 2201    Specimen: Urine, Clean Catch Updated: 10/05/23 2335     Eosinophil Smear 0 % EOS/100 Cells     CBC & Differential [[id number]]  (Abnormal) Collected: 10/05/23 2258    Specimen: Blood Updated: 10/05/23 2334    Narrative:      The following orders were created for panel order CBC & Differential.  Procedure                               Abnormality         Status                     ---------                               -----------         ------                     CBC Auto Differential[[id number]]        Abnormal            Final result                 Please view results for these tests on the individual orders.    CBC Auto Differential [[id number]]  (Abnormal) Collected: 10/05/23 2258    Specimen: Blood Updated: 10/05/23 2334     WBC 8.00 10*3/mm3      RBC 3.45 10*6/mm3      Hemoglobin 9.4 g/dL      Hematocrit 27.8 %      MCV 80.6 fL      MCH 27.1 pg      MCHC 33.7 g/dL      RDW 14.7 %      RDW-SD 44.2 fl      MPV 7.6 fL      Platelets 332 10*3/mm3      Neutrophil % 76.3 %      Lymphocyte %  11.7 %      Monocyte % 9.5 %      Eosinophil % 2.0 %      Basophil % 0.5 %      Neutrophils, Absolute 6.10 10*3/mm3      Lymphocytes, Absolute 0.90 10*3/mm3      Monocytes, Absolute 0.80 10*3/mm3      Eosinophils, Absolute 0.20 10*3/mm3      Basophils, Absolute 0.00 10*3/mm3      nRBC 0.1 /100 WBC     POC Glucose Once [[id number]]  (Abnormal) Collected: 10/05/23 2024    Specimen: Blood Updated: 10/05/23 2026     Glucose 277 mg/dL      Comment: Serial Number: [id number]:  [id number]       POC Glucose Once [[id number]]  (Abnormal) Collected: 10/05/23 1634    Specimen: Blood Updated: 10/05/23 1635     Glucose 193 mg/dL      Comment: Serial Number: [id number]:  [id number]       POC Glucose Once [[id number]]  (Abnormal) Collected: 10/05/23 1122    Specimen: Blood Updated: 10/05/23 1124     Glucose 211 mg/dL      Comment: Serial Number: [id number]:  [id number]                Imaging:  Imaging Results (Last 24 Hours)       Procedure Component Value Units Date/Time    CT Abdomen Pelvis Without Contrast [[id number]] Collected: 10/06/23 0906     Updated: 10/06/23 0936    Narrative:      CT ABDOMEN PELVIS WO CONTRAST    Date of Exam: 10/6/2023 8:45 AM EDT    Indication: Abdominal pain, acute, nonlocalized.    Comparison: 9/18/2023    Technique: Axial CT images were obtained of the abdomen and pelvis without the administration of contrast. Sagittal and coronal reconstructions were performed.  Automated exposure control and iterative reconstruction methods were used.      Findings:  Lung Bases:    The visualized lung bases and lower mediastinal structures are unremarkable.      Liver:Liver is normal in size and CT density. No focal lesions.      Biliary/Gallbladder: Again noted is multiple calcified gallstones    Spleen: Multiple punctate calcifications in the spleen    Pancreas:   Pancreas shows homogeneous density. There is no evidence of pancreatic mass or peripancreatic fluid.      Kidneys: The right kidney  shows mild residual right hydroureteronephrosis. No residual calcified obstructing stone is seen in the ureter. There is mild residual perinephric and periureteric fat stranding. The left kidney shows a nonobstructive stone in   the midpole measuring 4.5 mm. There is an additional tiny stone in the lower pole. No evidence of hydronephrosis or obstructing calculi      Adrenals: Adrenal glands are unremarkable.      Retroperitoneal/Lymph Nodes/Vasculature: No retroperitoneal adenopathy is identified by size criteria.      Gastrointestinal/Mesentery: The bowel loops are non-dilated without definite wall thickening or mass. The appendix appears within normal limits. No evidence of obstruction. No free air.      Bladder: The bladder is unremarkable    Small right ovarian cyst measuring 3.8 x 2.9 cm    There are broad-based disc aspect complexes in the lumbar spine the largest at L1-L2      Impression:      Impression:    1. Persistent mild right hydroureteronephrosis with no evidence of residual obstructing stones along the course of the collecting system    2. Left nephrolithiasis    3. Right ovarian cyst    4. Cholelithiasis        Electronically Signed: Sunny Urbano MD    10/6/2023 9:34 AM EDT    Workstation ID: [id number]    US Renal Bilateral [[id number]] Collected: 10/05/23 1434     Updated: 10/05/23 1438    Narrative:      US RENAL BILATERAL    Date of Exam: 10/5/2023 2:13 PM EDT    Indication: acute kidney injury.    Comparison: CT abdomen/pelvis 9/18/2023    Technique: Grayscale and color Doppler ultrasound evaluation of the kidneys and urinary bladder was performed.      Findings:  Right kidney measures 14.4 cm. Left kidney measures 12.8 cm. Echogenicity appears normal. There is mild right hydronephrosis. Urinary bladder is devoid of urine      Impression:      Impression:  Mild right hydronephrosis. Consider CT to evaluate for persistent obstructing ureteral calculus        Electronically Signed: Akash  Eliane    10/5/2023 2:36 PM EDT    Workstation ID: [id number]               Assessment:       Altered mental status    Hypokalemia    Acute kidney injury    Weakness    Hypocalcemia    Hypomagnesemia    JEROD (acute kidney injury)      Mild right hydroureteronephrosis status post ureteroscopy laser subsequent stent removal  JEROD  Funguria    Plan:     CT images reviewed, no obstruction, mild hydronephrosis normal postop stent removal  Residual stones in the left kidney we will follow as outpatient  Funguria evident on urinalysis, culture so far less than 25 K normal  edison  Recommend start antifungals-diflucan   Keep follow up as planned      Cory Phelps MD  [facility]  [facility] [facility], [address]  [place] [id number]  Office: [phone]  Available via [employer] Secure Chat  10/06/23  10:22 EDT

## 2023-10-06 NOTE — PROGRESS NOTES
"NEPHROLOGY PROGRESS NOTE------[facility]    [facility]  [phone]  Addi Askew MD      Patient Care Team:  Akash Weiner MD as PCP - General (Family Medicine)  Addi Askew MD as Consulting Physician (Nephrology)      Provider:  Addi Askew MD  Patient Name: Nora Tam  :  1980    SUBJECTIVE:  F/U JEROD  No chest pain or SOA    Medication:  fluconazole, 100 mg, Oral, Q24H  gabapentin, 200 mg, Oral, TID  insulin glargine, 25 Units, Subcutaneous, Nightly  insulin lispro, 2-9 Units, Subcutaneous, 4x Daily AC & at Bedtime  rosuvastatin, 20 mg, Oral, Daily  senna-docusate sodium, 2 tablet, Oral, BID  sodium bicarbonate, 1,300 mg, Oral, TID  sodium chloride, 10 mL, Intravenous, Q12H  topiramate, 50 mg, Oral, Q12H      sodium chloride, 125 mL/hr, Last Rate: 125 mL/hr (10/06/23 0817)        OBJECTIVE    Vital Sign Min/Max for last 24 hours  Temp  Min: 97.7 °F (36.5 °C)  Max: 98.1 °F (36.7 °C)   BP  Min: 95/64  Max: 120/79   Pulse  Min: 85  Max: 100   Resp  Min: 12  Max: 20   SpO2  Min: 96 %  Max: 100 %   No data recorded   No data recorded     Flowsheet Rows      Flowsheet Row First Filed Value   Admission Height 177.8 cm (70\") Documented at 10/04/2023 2028   Admission Weight 71.2 kg (156 lb 15.5 oz) Documented at 10/04/2023 2028            I/O this shift:  In: 600 [P.O.:600]  Out: -   I/O last 3 completed shifts:  In: 480 [P.O.:480]  Out: -     Physical Exam:  General Appearance: alert, appears stated age and cooperative  Head: normocephalic, without obvious abnormality and atraumatic  Eyes: conjunctivae and sclerae normal and no icterus  Neck: supple and no JVD  Lungs: clear to auscultation and respirations regular  Heart: regular rhythm & normal rate and normal S1, S2  Chest: Wall no abnormalities observed  Abdomen: normal bowel sounds and soft, nontender  Extremities: moves extremities well, no edema, no cyanosis and no " redness  Skin: no bleeding, bruising or rash, turgor normal, color normal and no lesions noted  Neurologic: Alert, and oriented. No focal deficits    Labs:    WBC WBC   Date Value Ref Range Status   10/05/2023 8.00 3.40 - 10.80 10*3/mm3 Final   10/05/2023 9.80 3.40 - 10.80 10*3/mm3 Final   10/04/2023 9.00 3.40 - 10.80 10*3/mm3 Final      HGB Hemoglobin   Date Value Ref Range Status   10/05/2023 9.4 (L) 12.0 - 15.9 g/dL Final   10/05/2023 10.2 (L) 12.0 - 15.9 g/dL Final   10/04/2023 11.6 (L) 12.0 - 15.9 g/dL Final      HCT Hematocrit   Date Value Ref Range Status   10/05/2023 27.8 (L) 34.0 - 46.6 % Final   10/05/2023 31.8 (L) 34.0 - 46.6 % Final   10/04/2023 36.0 34.0 - 46.6 % Final      Platelets No results found for: LABPLAT   MCV MCV   Date Value Ref Range Status   10/05/2023 80.6 79.0 - 97.0 fL Final   10/05/2023 83.0 79.0 - 97.0 fL Final   10/04/2023 82.3 79.0 - 97.0 fL Final          Sodium Sodium   Date Value Ref Range Status   10/05/2023 134 (L) 136 - 145 mmol/L Final   10/05/2023 138 136 - 145 mmol/L Final   10/04/2023 135 (L) 136 - 145 mmol/L Final      Potassium Potassium   Date Value Ref Range Status   10/05/2023 4.8 3.5 - 5.2 mmol/L Final   10/05/2023 4.2 3.5 - 5.2 mmol/L Final   10/05/2023 4.2 3.5 - 5.2 mmol/L Final   10/04/2023 3.4 (L) 3.5 - 5.2 mmol/L Final      Chloride Chloride   Date Value Ref Range Status   10/05/2023 103 98 - 107 mmol/L Final   10/05/2023 101 98 - 107 mmol/L Final   10/04/2023 95 (L) 98 - 107 mmol/L Final      CO2 CO2   Date Value Ref Range Status   10/05/2023 20.0 (L) 22.0 - 29.0 mmol/L Final   10/05/2023 22.0 22.0 - 29.0 mmol/L Final   10/04/2023 22.0 22.0 - 29.0 mmol/L Final      BUN BUN   Date Value Ref Range Status   10/05/2023 38 (H) 6 - 20 mg/dL Final   10/05/2023 34 (H) 6 - 20 mg/dL Final   10/04/2023 30 (H) 6 - 20 mg/dL Final      Creatinine Creatinine   Date Value Ref Range Status   10/05/2023 3.36 (H) 0.57 - 1.00 mg/dL Final   10/05/2023 3.03 (H) 0.57 - 1.00 mg/dL  Final   10/04/2023 2.40 (H) 0.57 - 1.00 mg/dL Final      Calcium Calcium   Date Value Ref Range Status   10/05/2023 8.1 (L) 8.6 - 10.5 mg/dL Final   10/05/2023 8.6 8.6 - 10.5 mg/dL Final   10/04/2023 8.5 (L) 8.6 - 10.5 mg/dL Final      PO4 No components found for: PO4   Albumin Albumin   Date Value Ref Range Status   10/05/2023 2.9 (L) 3.5 - 5.2 g/dL Final   10/04/2023 3.6 3.5 - 5.2 g/dL Final      Magnesium Magnesium   Date Value Ref Range Status   10/05/2023 2.5 1.6 - 2.6 mg/dL Final   10/05/2023 3.4 (H) 1.6 - 2.6 mg/dL Final   10/04/2023 1.2 (L) 1.6 - 2.6 mg/dL Final      Uric Acid No components found for: URIC ACID     Imaging Results (Last 72 Hours)       Procedure Component Value Units Date/Time    CT Abdomen Pelvis Without Contrast [[id number]] Collected: 10/06/23 0906     Updated: 10/06/23 0936    Narrative:      CT ABDOMEN PELVIS WO CONTRAST    Date of Exam: 10/6/2023 8:45 AM EDT    Indication: Abdominal pain, acute, nonlocalized.    Comparison: 9/18/2023    Technique: Axial CT images were obtained of the abdomen and pelvis without the administration of contrast. Sagittal and coronal reconstructions were performed.  Automated exposure control and iterative reconstruction methods were used.      Findings:  Lung Bases:    The visualized lung bases and lower mediastinal structures are unremarkable.      Liver:Liver is normal in size and CT density. No focal lesions.      Biliary/Gallbladder: Again noted is multiple calcified gallstones    Spleen: Multiple punctate calcifications in the spleen    Pancreas:   Pancreas shows homogeneous density. There is no evidence of pancreatic mass or peripancreatic fluid.      Kidneys: The right kidney shows mild residual right hydroureteronephrosis. No residual calcified obstructing stone is seen in the ureter. There is mild residual perinephric and periureteric fat stranding. The left kidney shows a nonobstructive stone in   the midpole measuring 4.5 mm. There is an  additional tiny stone in the lower pole. No evidence of hydronephrosis or obstructing calculi      Adrenals: Adrenal glands are unremarkable.      Retroperitoneal/Lymph Nodes/Vasculature: No retroperitoneal adenopathy is identified by size criteria.      Gastrointestinal/Mesentery: The bowel loops are non-dilated without definite wall thickening or mass. The appendix appears within normal limits. No evidence of obstruction. No free air.      Bladder: The bladder is unremarkable    Small right ovarian cyst measuring 3.8 x 2.9 cm    There are broad-based disc aspect complexes in the lumbar spine the largest at L1-L2      Impression:      Impression:    1. Persistent mild right hydroureteronephrosis with no evidence of residual obstructing stones along the course of the collecting system    2. Left nephrolithiasis    3. Right ovarian cyst    4. Cholelithiasis        Electronically Signed: Sunny Urbano MD    10/6/2023 9:34 AM EDT    Workstation ID: [id number]    US Renal Bilateral [[id number]] Collected: 10/05/23 1434     Updated: 10/05/23 1438    Narrative:      US RENAL BILATERAL    Date of Exam: 10/5/2023 2:13 PM EDT    Indication: acute kidney injury.    Comparison: CT abdomen/pelvis 9/18/2023    Technique: Grayscale and color Doppler ultrasound evaluation of the kidneys and urinary bladder was performed.      Findings:  Right kidney measures 14.4 cm. Left kidney measures 12.8 cm. Echogenicity appears normal. There is mild right hydronephrosis. Urinary bladder is devoid of urine      Impression:      Impression:  Mild right hydronephrosis. Consider CT to evaluate for persistent obstructing ureteral calculus        Electronically Signed: Akash Del Rio    10/5/2023 2:36 PM EDT    Workstation ID: [id number]    CT Head Without Contrast [[id number]] Collected: 10/04/23 2155     Updated: 10/04/23 2158    Narrative:      CT HEAD WO CONTRAST    Date of Exam: 10/4/2023 9:30 PM EDT    Indication: Ataxia speech difficulty  weakness.    Comparison: None available.    Technique: Axial CT images were obtained of the head without contrast administration.  Coronal reconstructions were performed.  Automated exposure control and iterative reconstruction methods were used.      Findings:      The ventricles and subarachnoid spaces are unremarkable.    There is no intracranial hemorrhage.    There is no evidence of resent large arterial distribution infarct.    There is no edema or mass effect. No midline shift.    Limited evaluation of the orbits is unremarkable    The visualized paranasal sinuses are unremarkable..    Evaluation of the osseous structure at the appropriate bones window is unremarkable.      Impression:      Impression:    No acute intracranial pathology.              Electronically Signed: Cleveland Bruce MD    10/4/2023 9:56 PM EDT    Workstation ID: [id number]    XR Chest 1 View [[id number]] Collected: 10/04/23 2141     Updated: 10/04/23 2144    Narrative:      XR CHEST 1 VW    Date of Exam: 10/4/2023 9:19 PM EDT    Indication: Altered mental status    Comparison: None available.    Findings:         There is no acute infiltrate.     There is no large pleural effusion.    The cardiac silhouette is unremarkable.    The visualized reinaldo structures demonstrate no abnormality.        Impression:      Impression:    No acute pulmonary disease.      Electronically Signed: Cleveland Bruce MD    10/4/2023 9:42 PM EDT    Workstation ID: [id number]            Results for orders placed during the hospital encounter of 10/04/23    XR Chest 1 View    Narrative  XR CHEST 1 VW    Date of Exam: 10/4/2023 9:19 PM EDT    Indication: Altered mental status    Comparison: None available.    Findings:        There is no acute infiltrate.    There is no large pleural effusion.    The cardiac silhouette is unremarkable.    The visualized reinaldo structures demonstrate no abnormality.    Impression  Impression:    No acute pulmonary  disease.      Electronically Signed: Cleveland Bruce MD  10/4/2023 9:42 PM EDT  Workstation ID: [id number]            ASSESSMENT / PLAN      Altered mental status    Hypokalemia    Acute kidney injury    Weakness    Hypocalcemia    Hypomagnesemia    JEROD (acute kidney injury)      JEROD--Suspect AIN related to recent quinolone use and/or NSAIDs.. Check urine for eos.  Although she has mild right hydro, I doubt this is contributing to her JEROD.  Nevertheless will have urology reconsult on her  UTI/ fungiuria-recent UTI with Pseudomonas.  Treated.. Duflucan now  Right-sided hydronephrosis-recent stone removal and stent placement.  Now stent is removed.  Altered mental status-resolved  Hypomagnesemia-replaced.     C Rstill up  Appreciate Urology eval. CT mild residual hydro  Urine eos 0%, on eosinophilia  Check CK  Continue IVF        Addi Askew MD  [facility]  [phone]  10/06/23  12:59 EDT

## 2023-10-06 NOTE — PLAN OF CARE
Goal Outcome Evaluation:      Patient sleeping in between care. Patient remains on IV fluids. Patient complained of pain this shift, see MAR. Patient has no new complaints at this time. Patient is stable at this time.

## 2023-10-06 NOTE — PROGRESS NOTES
LOS: 1 day   Patient Care Team:  Akash Weiner MD as PCP - General (Family Medicine)  Addi Askew MD as Consulting Physician (Nephrology)    Subjective     Interval History: Passed a stone this am; creatinine up slightly    Patient Complaints: No pain preceding stone passing; blood sugars running a little high; overall feels well and anxious to be discharged.    History taken from: patient    Review of Systems   Constitutional:  Negative for activity change, appetite change, chills, diaphoresis, fatigue and fever.   HENT:  Negative for facial swelling.    Eyes:  Negative for visual disturbance.   Respiratory:  Negative for cough, shortness of breath, wheezing and stridor.    Cardiovascular:  Negative for chest pain, palpitations and leg swelling.   Gastrointestinal:  Negative for abdominal pain, constipation, diarrhea, nausea and vomiting.   Endocrine: Negative for polyuria.   Genitourinary:  Negative for dysuria.   Musculoskeletal:  Positive for arthralgias, back pain, gait problem and myalgias.   Skin:  Negative for rash and wound.   Neurological:  Negative for dizziness, seizures, light-headedness, numbness and headaches.   Psychiatric/Behavioral:  Negative for confusion.          Objective     Vital Signs  Temp:  [97.7 °F (36.5 °C)-98.1 °F (36.7 °C)] 97.7 °F (36.5 °C)  Heart Rate:  [] 89  Resp:  [12-20] 16  BP: ()/(60-83) 120/79    Physical Exam:     General Appearance:    Alert, cooperative, in no acute distress,   Head:    Normocephalic, without obvious abnormality, atraumatic   Eyes:            Lids and lashes normal, conjunctivae and sclerae normal, no   icterus, no pallor, corneas clear, PERRLA   Ears:    Ears appear intact with no abnormalities noted   Throat:   No oral lesions, no thrush, oral mucosa moist   Neck:   No adenopathy, supple, trachea midline, no thyromegaly, no   carotid bruit, no JVD   Lungs:     Clear to auscultation,respirations regular, even and                   unlabored    Heart:    Regular rhythm and normal rate, normal S1 and S2, no            murmur, no gallop, no rub, no click   Chest Wall:    No abnormalities observed   Abdomen:     Normal bowel sounds, no masses, no organomegaly, soft        Non-tender non-distended, no guarding,   Extremities:   Moves all extremities well, no edema, no cyanosis, no             Redness   Pulses:   Pulses palpable and equal bilaterally   Skin:   No bleeding, bruising or rash   Lymph nodes:   No palpable adenopathy   Neurologic:   Cranial nerves 2 - 12 grossly intact, sensation intact, DTR       present and equal bilaterally        Results Review:    Lab Results (last 24 hours)       Procedure Component Value Units Date/Time    CK [[id number]]  (Normal) Collected: 10/05/23 2258    Specimen: Blood Updated: 10/06/23 1324     Creatine Kinase 38 U/L     POC Glucose Once [[id number]]  (Abnormal) Collected: 10/06/23 1129    Specimen: Blood Updated: 10/06/23 1130     Glucose 195 mg/dL      Comment: Serial Number: [id number]:  [id number]       Urine Culture - Urine, Urine, Clean Catch [[id number]] Collected: 10/04/23 2213    Specimen: Urine, Clean Catch Updated: 10/06/23 1018     Urine Culture <25,000 CFU/mL Normal Urogenital Adriana    Narrative:      Colonization of the urinary tract without infection is common. Treatment is discouraged unless the patient is symptomatic, pregnant, or undergoing an invasive urologic procedure.    POC Glucose Once [[id number]]  (Abnormal) Collected: 10/06/23 0732    Specimen: Blood Updated: 10/06/23 0734     Glucose 208 mg/dL      Comment: Serial Number: [id number]:  [id number]       Magnesium [[id number]]  (Normal) Collected: 10/05/23 2258    Specimen: Blood Updated: 10/06/23 0005     Magnesium 2.5 mg/dL     Renal Function Panel [[id number]]  (Abnormal) Collected: 10/05/23 2258    Specimen: Blood Updated: 10/06/23 0005     Glucose 248 mg/dL      BUN 38 mg/dL      Creatinine 3.36 mg/dL      Sodium 134  mmol/L      Potassium 4.8 mmol/L      Chloride 103 mmol/L      CO2 20.0 mmol/L      Calcium 8.1 mg/dL      Albumin 2.9 g/dL      Phosphorus 3.1 mg/dL      Anion Gap 11.0 mmol/L      BUN/Creatinine Ratio 11.3     eGFR 16.6 mL/min/1.73     Narrative:      GFR Normal >60  Chronic Kidney Disease <60  Kidney Failure <15      Uric Acid [[id number]]  (Normal) Collected: 10/05/23 2258    Specimen: Blood Updated: 10/06/23 0005     Uric Acid 5.5 mg/dL     Eosinophil Smear - Urine, Urine, Clean Catch [[id number]]  (Normal) Collected: 10/05/23 2201    Specimen: Urine, Clean Catch Updated: 10/05/23 2335     Eosinophil Smear 0 % EOS/100 Cells     CBC & Differential [[id number]]  (Abnormal) Collected: 10/05/23 2258    Specimen: Blood Updated: 10/05/23 2334    Narrative:      The following orders were created for panel order CBC & Differential.  Procedure                               Abnormality         Status                     ---------                               -----------         ------                     CBC Auto Differential[[id number]]        Abnormal            Final result                 Please view results for these tests on the individual orders.    CBC Auto Differential [[id number]]  (Abnormal) Collected: 10/05/23 2258    Specimen: Blood Updated: 10/05/23 2334     WBC 8.00 10*3/mm3      RBC 3.45 10*6/mm3      Hemoglobin 9.4 g/dL      Hematocrit 27.8 %      MCV 80.6 fL      MCH 27.1 pg      MCHC 33.7 g/dL      RDW 14.7 %      RDW-SD 44.2 fl      MPV 7.6 fL      Platelets 332 10*3/mm3      Neutrophil % 76.3 %      Lymphocyte % 11.7 %      Monocyte % 9.5 %      Eosinophil % 2.0 %      Basophil % 0.5 %      Neutrophils, Absolute 6.10 10*3/mm3      Lymphocytes, Absolute 0.90 10*3/mm3      Monocytes, Absolute 0.80 10*3/mm3      Eosinophils, Absolute 0.20 10*3/mm3      Basophils, Absolute 0.00 10*3/mm3      nRBC 0.1 /100 WBC     POC Glucose Once [[id number]]  (Abnormal) Collected: 10/05/23 2024    Specimen: Blood Updated:  10/05/23 2026     Glucose 277 mg/dL      Comment: Serial Number: [id number]:  [id number]       POC Glucose Once [[id number]]  (Abnormal) Collected: 10/05/23 1634    Specimen: Blood Updated: 10/05/23 1635     Glucose 193 mg/dL      Comment: Serial Number: [id number]:  [id number]                Imaging Results (Last 24 Hours)       Procedure Component Value Units Date/Time    CT Abdomen Pelvis Without Contrast [[id number]] Collected: 10/06/23 0906     Updated: 10/06/23 0936    Narrative:      CT ABDOMEN PELVIS WO CONTRAST    Date of Exam: 10/6/2023 8:45 AM EDT    Indication: Abdominal pain, acute, nonlocalized.    Comparison: 9/18/2023    Technique: Axial CT images were obtained of the abdomen and pelvis without the administration of contrast. Sagittal and coronal reconstructions were performed.  Automated exposure control and iterative reconstruction methods were used.      Findings:  Lung Bases:    The visualized lung bases and lower mediastinal structures are unremarkable.      Liver:Liver is normal in size and CT density. No focal lesions.      Biliary/Gallbladder: Again noted is multiple calcified gallstones    Spleen: Multiple punctate calcifications in the spleen    Pancreas:   Pancreas shows homogeneous density. There is no evidence of pancreatic mass or peripancreatic fluid.      Kidneys: The right kidney shows mild residual right hydroureteronephrosis. No residual calcified obstructing stone is seen in the ureter. There is mild residual perinephric and periureteric fat stranding. The left kidney shows a nonobstructive stone in   the midpole measuring 4.5 mm. There is an additional tiny stone in the lower pole. No evidence of hydronephrosis or obstructing calculi      Adrenals: Adrenal glands are unremarkable.      Retroperitoneal/Lymph Nodes/Vasculature: No retroperitoneal adenopathy is identified by size criteria.      Gastrointestinal/Mesentery: The bowel loops are non-dilated without definite  wall thickening or mass. The appendix appears within normal limits. No evidence of obstruction. No free air.      Bladder: The bladder is unremarkable    Small right ovarian cyst measuring 3.8 x 2.9 cm    There are broad-based disc aspect complexes in the lumbar spine the largest at L1-L2      Impression:      Impression:    1. Persistent mild right hydroureteronephrosis with no evidence of residual obstructing stones along the course of the collecting system    2. Left nephrolithiasis    3. Right ovarian cyst    4. Cholelithiasis        Electronically Signed: Sunny Urbano MD    10/6/2023 9:34 AM EDT    Workstation ID: [id number]    US Renal Bilateral [[id number]] Collected: 10/05/23 1434     Updated: 10/05/23 1438    Narrative:      US RENAL BILATERAL    Date of Exam: 10/5/2023 2:13 PM EDT    Indication: acute kidney injury.    Comparison: CT abdomen/pelvis 9/18/2023    Technique: Grayscale and color Doppler ultrasound evaluation of the kidneys and urinary bladder was performed.      Findings:  Right kidney measures 14.4 cm. Left kidney measures 12.8 cm. Echogenicity appears normal. There is mild right hydronephrosis. Urinary bladder is devoid of urine      Impression:      Impression:  Mild right hydronephrosis. Consider CT to evaluate for persistent obstructing ureteral calculus        Electronically Signed: Akash Del Rio    10/5/2023 2:36 PM EDT    Workstation ID: [id number]                 I reviewed the patient's new clinical results.    Medication Review:   Scheduled Meds:fluconazole, 100 mg, Oral, Q24H  gabapentin, 300 mg, Oral, TID  insulin glargine, 25 Units, Subcutaneous, Nightly  insulin lispro, 2-9 Units, Subcutaneous, 4x Daily AC & at Bedtime  rosuvastatin, 20 mg, Oral, Daily  senna-docusate sodium, 2 tablet, Oral, BID  sodium bicarbonate, 1,300 mg, Oral, TID  sodium chloride, 10 mL, Intravenous, Q12H  topiramate, 50 mg, Oral, Q12H      Continuous Infusions:sodium chloride, 125 mL/hr, Last Rate: 125  mL/hr (10/06/23 0817)      PRN Meds:.  acetaminophen    senna-docusate sodium **AND** polyethylene glycol **AND** bisacodyl **AND** bisacodyl    Calcium Replacement - Follow Nurse / BPA Driven Protocol    dextrose    dextrose    glucagon (human recombinant)    hydrALAZINE    Magnesium Standard Dose Replacement - Follow Nurse / BPA Driven Protocol    melatonin    methocarbamol    ondansetron **OR** ondansetron    Phosphorus Replacement - Follow Nurse / BPA Driven Protocol    Potassium Replacement - Follow Nurse / BPA Driven Protocol    [COMPLETED] Insert Peripheral IV **AND** sodium chloride    sodium chloride    sodium chloride     Assessment & Plan       Altered mental status    Hypokalemia    Acute kidney injury    Weakness    Hypocalcemia    Hypomagnesemia    JEROD (acute kidney injury)  Kidney stone  DM-II poorly controlled  Chronic back pain  Yeast UTI - fluconazole    Continue IV fluids and oral sodium bicarbonate.  Discussed need to discontinue NSAID's.  Will replace NSAID's with methocarbamol prn and higher dose of gabapentin.  Increasing dose of SSI    Plan for disposition:Home when creatinine trends down.    Micaela Isabel MD  10/06/23  14:03 EDT

## 2023-10-07 ENCOUNTER — READMISSION MANAGEMENT (OUTPATIENT)
Dept: CALL CENTER | Facility: HOSPITAL | Age: 43
End: 2023-10-07
Payer: COMMERCIAL

## 2023-10-07 VITALS
BODY MASS INDEX: 22.47 KG/M2 | OXYGEN SATURATION: 99 % | SYSTOLIC BLOOD PRESSURE: 127 MMHG | WEIGHT: 156.97 LBS | RESPIRATION RATE: 16 BRPM | HEART RATE: 86 BPM | HEIGHT: 70 IN | TEMPERATURE: 98 F | DIASTOLIC BLOOD PRESSURE: 86 MMHG

## 2023-10-07 PROBLEM — M50.30 DDD (DEGENERATIVE DISC DISEASE), CERVICAL: Status: ACTIVE | Noted: 2023-10-07

## 2023-10-07 PROBLEM — G89.4 CHRONIC PAIN SYNDROME: Status: ACTIVE | Noted: 2023-10-07

## 2023-10-07 LAB
ALBUMIN SERPL-MCNC: 3 G/DL (ref 3.5–5.2)
ANION GAP SERPL CALCULATED.3IONS-SCNC: 12 MMOL/L (ref 5–15)
BASOPHILS # BLD AUTO: 0.1 10*3/MM3 (ref 0–0.2)
BASOPHILS NFR BLD AUTO: 0.6 % (ref 0–1.5)
BUN SERPL-MCNC: 36 MG/DL (ref 6–20)
BUN/CREAT SERPL: 13.8 (ref 7–25)
CALCIUM SPEC-SCNC: 8.4 MG/DL (ref 8.6–10.5)
CHLORIDE SERPL-SCNC: 107 MMOL/L (ref 98–107)
CO2 SERPL-SCNC: 22 MMOL/L (ref 22–29)
CREAT SERPL-MCNC: 2.61 MG/DL (ref 0.57–1)
DEPRECATED RDW RBC AUTO: 42.4 FL (ref 37–54)
EGFRCR SERPLBLD CKD-EPI 2021: 22.4 ML/MIN/1.73
EOSINOPHIL # BLD AUTO: 0.1 10*3/MM3 (ref 0–0.4)
EOSINOPHIL NFR BLD AUTO: 1.4 % (ref 0.3–6.2)
ERYTHROCYTE [DISTWIDTH] IN BLOOD BY AUTOMATED COUNT: 14.6 % (ref 12.3–15.4)
GLUCOSE BLDC GLUCOMTR-MCNC: 171 MG/DL (ref 70–105)
GLUCOSE SERPL-MCNC: 180 MG/DL (ref 65–99)
HCT VFR BLD AUTO: 27.6 % (ref 34–46.6)
HGB BLD-MCNC: 9 G/DL (ref 12–15.9)
LYMPHOCYTES # BLD AUTO: 1.2 10*3/MM3 (ref 0.7–3.1)
LYMPHOCYTES NFR BLD AUTO: 11.8 % (ref 19.6–45.3)
MCH RBC QN AUTO: 26.7 PG (ref 26.6–33)
MCHC RBC AUTO-ENTMCNC: 32.5 G/DL (ref 31.5–35.7)
MCV RBC AUTO: 82.3 FL (ref 79–97)
MONOCYTES # BLD AUTO: 1 10*3/MM3 (ref 0.1–0.9)
MONOCYTES NFR BLD AUTO: 9.9 % (ref 5–12)
NEUTROPHILS NFR BLD AUTO: 7.9 10*3/MM3 (ref 1.7–7)
NEUTROPHILS NFR BLD AUTO: 76.3 % (ref 42.7–76)
NRBC BLD AUTO-RTO: 0.1 /100 WBC (ref 0–0.2)
PHOSPHATE SERPL-MCNC: 2.9 MG/DL (ref 2.5–4.5)
PLATELET # BLD AUTO: 384 10*3/MM3 (ref 140–450)
PMV BLD AUTO: 7.5 FL (ref 6–12)
POTASSIUM SERPL-SCNC: 4.2 MMOL/L (ref 3.5–5.2)
RBC # BLD AUTO: 3.35 10*6/MM3 (ref 3.77–5.28)
SODIUM SERPL-SCNC: 141 MMOL/L (ref 136–145)
WBC NRBC COR # BLD: 10.4 10*3/MM3 (ref 3.4–10.8)

## 2023-10-07 PROCEDURE — 63710000001 INSULIN LISPRO (HUMAN) PER 5 UNITS

## 2023-10-07 PROCEDURE — 82948 REAGENT STRIP/BLOOD GLUCOSE: CPT

## 2023-10-07 PROCEDURE — 25810000003 SODIUM CHLORIDE 0.9 % SOLUTION: Performed by: INTERNAL MEDICINE

## 2023-10-07 PROCEDURE — 85025 COMPLETE CBC W/AUTO DIFF WBC: CPT

## 2023-10-07 PROCEDURE — 80069 RENAL FUNCTION PANEL: CPT | Performed by: INTERNAL MEDICINE

## 2023-10-07 RX ORDER — IBUPROFEN 200 MG
1 TABLET ORAL 4 TIMES DAILY PRN
Qty: 200 EACH | Refills: 1 | Status: SHIPPED | OUTPATIENT
Start: 2023-10-07

## 2023-10-07 RX ORDER — GABAPENTIN 300 MG/1
300 CAPSULE ORAL 3 TIMES DAILY
Qty: 90 CAPSULE | Refills: 0 | Status: SHIPPED | OUTPATIENT
Start: 2023-10-07

## 2023-10-07 RX ORDER — INSULIN GLARGINE 100 [IU]/ML
25 INJECTION, SOLUTION SUBCUTANEOUS DAILY
Qty: 10 ML | Refills: 12 | Status: SHIPPED | OUTPATIENT
Start: 2023-10-07

## 2023-10-07 RX ORDER — FLUCONAZOLE 100 MG/1
100 TABLET ORAL
Qty: 12 TABLET | Refills: 0 | Status: SHIPPED | OUTPATIENT
Start: 2023-10-08 | End: 2023-10-20

## 2023-10-07 RX ORDER — SODIUM BICARBONATE 650 MG/1
1300 TABLET ORAL 3 TIMES DAILY
Qty: 120 TABLET | Refills: 0 | Status: SHIPPED | OUTPATIENT
Start: 2023-10-07

## 2023-10-07 RX ORDER — METHOCARBAMOL 500 MG/1
500 TABLET, FILM COATED ORAL EVERY 8 HOURS PRN
Qty: 60 TABLET | Refills: 0 | Status: SHIPPED | OUTPATIENT
Start: 2023-10-07

## 2023-10-07 RX ORDER — INSULIN LISPRO 100 [IU]/ML
INJECTION, SOLUTION INTRAVENOUS; SUBCUTANEOUS
Qty: 10 ML | Refills: 12 | Status: SHIPPED | OUTPATIENT
Start: 2023-10-07

## 2023-10-07 RX ADMIN — ROSUVASTATIN 20 MG: 10 TABLET, FILM COATED ORAL at 07:46

## 2023-10-07 RX ADMIN — Medication 10 ML: at 07:44

## 2023-10-07 RX ADMIN — TOPIRAMATE 50 MG: 25 TABLET, FILM COATED ORAL at 07:46

## 2023-10-07 RX ADMIN — SODIUM BICARBONATE 1300 MG: 650 TABLET ORAL at 07:46

## 2023-10-07 RX ADMIN — INSULIN LISPRO 2 UNITS: 100 INJECTION, SOLUTION INTRAVENOUS; SUBCUTANEOUS at 07:47

## 2023-10-07 RX ADMIN — FLUCONAZOLE 100 MG: 100 TABLET ORAL at 07:47

## 2023-10-07 RX ADMIN — GABAPENTIN 300 MG: 300 CAPSULE ORAL at 07:46

## 2023-10-07 RX ADMIN — SODIUM CHLORIDE 125 ML/HR: 9 INJECTION, SOLUTION INTRAVENOUS at 09:56

## 2023-10-07 RX ADMIN — INSULIN LISPRO 2 UNITS: 100 INJECTION, SOLUTION INTRAVENOUS; SUBCUTANEOUS at 12:02

## 2023-10-07 NOTE — OUTREACH NOTE
Prep Survey      Flowsheet Row Responses   [facility] discharged from? [facility]   Is LACE score < 7 ? No   Eligibility Readm Mgmt   Discharge diagnosis **Altered mental status   Does the patient have one of the following disease processes/diagnoses(primary or secondary)? Other   Does the patient have Home health ordered? No   Is there a DME ordered? No   Prep survey completed? Yes            DAVID NUNEZ - Registered Nurse

## 2023-10-07 NOTE — PROGRESS NOTES
"NEPHROLOGY PROGRESS NOTE------[facility]    [facility]  [phone]  Addi Askew MD      Patient Care Team:  Akash Weiner MD as PCP - General (Family Medicine)  Addi Askew MD as Consulting Physician (Nephrology)      Provider:  Addi Askew MD  Patient Name: Nora Tam  :  1980    SUBJECTIVE:  F/U JEROD  No chest pain or SOA    Medication:  fluconazole, 100 mg, Oral, Q24H  gabapentin, 300 mg, Oral, TID  insulin glargine, 25 Units, Subcutaneous, Nightly  insulin lispro, 2-9 Units, Subcutaneous, 4x Daily AC & at Bedtime  rosuvastatin, 20 mg, Oral, Daily  senna-docusate sodium, 2 tablet, Oral, BID  sodium bicarbonate, 1,300 mg, Oral, TID  sodium chloride, 10 mL, Intravenous, Q12H  topiramate, 50 mg, Oral, Q12H      sodium chloride, 125 mL/hr, Last Rate: 125 mL/hr (10/06/23 0817)        OBJECTIVE    Vital Sign Min/Max for last 24 hours  Temp  Min: 97.5 °F (36.4 °C)  Max: 98.6 °F (37 °C)   BP  Min: 120/79  Max: 137/88   Pulse  Min: 89  Max: 95   Resp  Min: 16  Max: 22   SpO2  Min: 97 %  Max: 100 %   No data recorded   No data recorded     Flowsheet Rows      Flowsheet Row First Filed Value   Admission Height 177.8 cm (70\") Documented at 10/04/2023 2028   Admission Weight 71.2 kg (156 lb 15.5 oz) Documented at 10/04/2023 2028            No intake/output data recorded.  I/O last 3 completed shifts:  In: 1560 [P.O.:1560]  Out: -     Physical Exam:  General Appearance: alert, appears stated age and cooperative  Head: normocephalic, without obvious abnormality and atraumatic  Eyes: conjunctivae and sclerae normal and no icterus  Neck: supple and no JVD  Lungs: clear to auscultation and respirations regular  Heart: regular rhythm & normal rate and normal S1, S2  Chest: Wall no abnormalities observed  Abdomen: normal bowel sounds and soft, nontender  Extremities: moves extremities well, no edema, no cyanosis and no redness  Skin: no " "bleeding, bruising or rash, turgor normal, color normal and no lesions noted  Neurologic: Alert, and oriented. No focal deficits    Labs:    WBC WBC   Date Value Ref Range Status   10/07/2023 10.40 3.40 - 10.80 10*3/mm3 Final   10/05/2023 8.00 3.40 - 10.80 10*3/mm3 Final   10/05/2023 9.80 3.40 - 10.80 10*3/mm3 Final   10/04/2023 9.00 3.40 - 10.80 10*3/mm3 Final      HGB Hemoglobin   Date Value Ref Range Status   10/07/2023 9.0 (L) 12.0 - 15.9 g/dL Final   10/05/2023 9.4 (L) 12.0 - 15.9 g/dL Final   10/05/2023 10.2 (L) 12.0 - 15.9 g/dL Final   10/04/2023 11.6 (L) 12.0 - 15.9 g/dL Final      HCT Hematocrit   Date Value Ref Range Status   10/07/2023 27.6 (L) 34.0 - 46.6 % Final   10/05/2023 27.8 (L) 34.0 - 46.6 % Final   10/05/2023 31.8 (L) 34.0 - 46.6 % Final   10/04/2023 36.0 34.0 - 46.6 % Final      Platelets No results found for: \"LABPLAT\"   MCV MCV   Date Value Ref Range Status   10/07/2023 82.3 79.0 - 97.0 fL Final   10/05/2023 80.6 79.0 - 97.0 fL Final   10/05/2023 83.0 79.0 - 97.0 fL Final   10/04/2023 82.3 79.0 - 97.0 fL Final          Sodium Sodium   Date Value Ref Range Status   10/05/2023 134 (L) 136 - 145 mmol/L Final   10/05/2023 138 136 - 145 mmol/L Final   10/04/2023 135 (L) 136 - 145 mmol/L Final      Potassium Potassium   Date Value Ref Range Status   10/05/2023 4.8 3.5 - 5.2 mmol/L Final   10/05/2023 4.2 3.5 - 5.2 mmol/L Final   10/05/2023 4.2 3.5 - 5.2 mmol/L Final   10/04/2023 3.4 (L) 3.5 - 5.2 mmol/L Final      Chloride Chloride   Date Value Ref Range Status   10/05/2023 103 98 - 107 mmol/L Final   10/05/2023 101 98 - 107 mmol/L Final   10/04/2023 95 (L) 98 - 107 mmol/L Final      CO2 CO2   Date Value Ref Range Status   10/05/2023 20.0 (L) 22.0 - 29.0 mmol/L Final   10/05/2023 22.0 22.0 - 29.0 mmol/L Final   10/04/2023 22.0 22.0 - 29.0 mmol/L Final      BUN BUN   Date Value Ref Range Status   10/05/2023 38 (H) 6 - 20 mg/dL Final   10/05/2023 34 (H) 6 - 20 mg/dL Final   10/04/2023 30 (H) 6 - 20 " "mg/dL Final      Creatinine Creatinine   Date Value Ref Range Status   10/05/2023 3.36 (H) 0.57 - 1.00 mg/dL Final   10/05/2023 3.03 (H) 0.57 - 1.00 mg/dL Final   10/04/2023 2.40 (H) 0.57 - 1.00 mg/dL Final      Calcium Calcium   Date Value Ref Range Status   10/05/2023 8.1 (L) 8.6 - 10.5 mg/dL Final   10/05/2023 8.6 8.6 - 10.5 mg/dL Final   10/04/2023 8.5 (L) 8.6 - 10.5 mg/dL Final      PO4 No components found for: \"PO4\"   Albumin Albumin   Date Value Ref Range Status   10/05/2023 2.9 (L) 3.5 - 5.2 g/dL Final   10/04/2023 3.6 3.5 - 5.2 g/dL Final      Magnesium Magnesium   Date Value Ref Range Status   10/05/2023 2.5 1.6 - 2.6 mg/dL Final   10/05/2023 3.4 (H) 1.6 - 2.6 mg/dL Final   10/04/2023 1.2 (L) 1.6 - 2.6 mg/dL Final      Uric Acid No components found for: \"URIC ACID\"     Imaging Results (Last 72 Hours)       Procedure Component Value Units Date/Time    CT Abdomen Pelvis Without Contrast [[id number]] Collected: 10/06/23 0906     Updated: 10/06/23 0936    Narrative:      CT ABDOMEN PELVIS WO CONTRAST    Date of Exam: 10/6/2023 8:45 AM EDT    Indication: Abdominal pain, acute, nonlocalized.    Comparison: 9/18/2023    Technique: Axial CT images were obtained of the abdomen and pelvis without the administration of contrast. Sagittal and coronal reconstructions were performed.  Automated exposure control and iterative reconstruction methods were used.      Findings:  Lung Bases:    The visualized lung bases and lower mediastinal structures are unremarkable.      Liver:Liver is normal in size and CT density. No focal lesions.      Biliary/Gallbladder: Again noted is multiple calcified gallstones    Spleen: Multiple punctate calcifications in the spleen    Pancreas:   Pancreas shows homogeneous density. There is no evidence of pancreatic mass or peripancreatic fluid.      Kidneys: The right kidney shows mild residual right hydroureteronephrosis. No residual calcified obstructing stone is seen in the ureter. There is " mild residual perinephric and periureteric fat stranding. The left kidney shows a nonobstructive stone in   the midpole measuring 4.5 mm. There is an additional tiny stone in the lower pole. No evidence of hydronephrosis or obstructing calculi      Adrenals: Adrenal glands are unremarkable.      Retroperitoneal/Lymph Nodes/Vasculature: No retroperitoneal adenopathy is identified by size criteria.      Gastrointestinal/Mesentery: The bowel loops are non-dilated without definite wall thickening or mass. The appendix appears within normal limits. No evidence of obstruction. No free air.      Bladder: The bladder is unremarkable    Small right ovarian cyst measuring 3.8 x 2.9 cm    There are broad-based disc aspect complexes in the lumbar spine the largest at L1-L2      Impression:      Impression:    1. Persistent mild right hydroureteronephrosis with no evidence of residual obstructing stones along the course of the collecting system    2. Left nephrolithiasis    3. Right ovarian cyst    4. Cholelithiasis        Electronically Signed: Sunny Urbano MD    10/6/2023 9:34 AM EDT    Workstation ID: [id number]    US Renal Bilateral [[id number]] Collected: 10/05/23 1434     Updated: 10/05/23 1438    Narrative:      US RENAL BILATERAL    Date of Exam: 10/5/2023 2:13 PM EDT    Indication: acute kidney injury.    Comparison: CT abdomen/pelvis 9/18/2023    Technique: Grayscale and color Doppler ultrasound evaluation of the kidneys and urinary bladder was performed.      Findings:  Right kidney measures 14.4 cm. Left kidney measures 12.8 cm. Echogenicity appears normal. There is mild right hydronephrosis. Urinary bladder is devoid of urine      Impression:      Impression:  Mild right hydronephrosis. Consider CT to evaluate for persistent obstructing ureteral calculus        Electronically Signed: Akash Del Rio    10/5/2023 2:36 PM EDT    Workstation ID: [id number]    CT Head Without Contrast [[id number]] Collected: 10/04/23 9466      Updated: 10/04/23 2158    Narrative:      CT HEAD WO CONTRAST    Date of Exam: 10/4/2023 9:30 PM EDT    Indication: Ataxia speech difficulty weakness.    Comparison: None available.    Technique: Axial CT images were obtained of the head without contrast administration.  Coronal reconstructions were performed.  Automated exposure control and iterative reconstruction methods were used.      Findings:      The ventricles and subarachnoid spaces are unremarkable.    There is no intracranial hemorrhage.    There is no evidence of resent large arterial distribution infarct.    There is no edema or mass effect. No midline shift.    Limited evaluation of the orbits is unremarkable    The visualized paranasal sinuses are unremarkable..    Evaluation of the osseous structure at the appropriate bones window is unremarkable.      Impression:      Impression:    No acute intracranial pathology.              Electronically Signed: Cleveland Bruce MD    10/4/2023 9:56 PM EDT    Workstation ID: [id number]    XR Chest 1 View [[id number]] Collected: 10/04/23 2141     Updated: 10/04/23 2144    Narrative:      XR CHEST 1 VW    Date of Exam: 10/4/2023 9:19 PM EDT    Indication: Altered mental status    Comparison: None available.    Findings:         There is no acute infiltrate.     There is no large pleural effusion.    The cardiac silhouette is unremarkable.    The visualized reinaldo structures demonstrate no abnormality.        Impression:      Impression:    No acute pulmonary disease.      Electronically Signed: Cleveland Bruce MD    10/4/2023 9:42 PM EDT    Workstation ID: [id number]            Results for orders placed during the hospital encounter of 10/04/23    XR Chest 1 View    Narrative  XR CHEST 1 VW    Date of Exam: 10/4/2023 9:19 PM EDT    Indication: Altered mental status    Comparison: None available.    Findings:        There is no acute infiltrate.    There is no large pleural effusion.    The cardiac silhouette is  unremarkable.    The visualized reinaldo structures demonstrate no abnormality.    Impression  Impression:    No acute pulmonary disease.      Electronically Signed: Cleveland Bruce MD  10/4/2023 9:42 PM EDT  Workstation ID: [id number]            ASSESSMENT / PLAN      Altered mental status    Hypokalemia    Acute kidney injury    Weakness    Hypocalcemia    Hypomagnesemia    JEROD (acute kidney injury)      JEROD--Suspect AIN related to recent quinolone use and/or NSAIDs.. Check urine for eos.  Although she has mild right hydro, I doubt this is contributing to her JEROD.  Urine eosinophils 0% UTI/ fungiuria-recent UTI with Pseudomonas.  Treated.. Duflucan now  Right-sided hydronephrosis-recent stone removal and stent.  Seen by urology no further intervention at this point placement.  Now stent is removed.  Altered mental status-resolved  Hypomagnesemia-replaced.  NSAID use     CR better  Will stop fluids  OK for d/c with close f/u 2 weeks  BMP next week        Addi Askew MD  [facility]  [phone]  10/07/23  08:21 EDT     Deep Sutures: 3-0 Vicryl

## 2023-10-07 NOTE — DISCHARGE SUMMARY
Date of Discharge:  10/7/2023    Discharge Diagnosis:   **Altered mental status [R41.82]   DDD (degenerative disc disease), cervical [M50.30]   Chronic pain syndrome [G89.4]   JEROD (acute kidney injury) [N17.9]   Acute kidney injury [N17.9]   Weakness [R53.1]   Hypocalcemia [E83.51]   Hypomagnesemia [E83.42]   Kidney stone [N20.0]   Hypokalemia [E87.6]   Diabetes mellitus with hyperglycemia [E11.65]       Presenting Problem/History of Present Illness  Active Hospital Problems    Diagnosis  POA    **Altered mental status [R41.82]  Yes    DDD (degenerative disc disease), cervical [M50.30]  Yes    Chronic pain syndrome [G89.4]  Yes    JEROD (acute kidney injury) [N17.9]  Yes    Acute kidney injury [N17.9]  Yes    Weakness [R53.1]  Yes    Hypocalcemia [E83.51]  Yes    Hypomagnesemia [E83.42]  Yes    Kidney stone [N20.0]  Yes    Hypokalemia [E87.6]  Yes    Diabetes mellitus with hyperglycemia [E11.65]  Yes      Resolved Hospital Problems   No resolved problems to display.          Hospital Course  Patient is a 43 y.o. female with history of type 2 diabetes and recent kidney stone and urinary tract infection who presented with altered mental status.  She had hypomagnesemia, hypocalcemia and acute kidney injury with creatinine of 2.4 on admission.  2 weeks prior it had been 0.6.  Etiology of acute kidney injury was likely multifactorial with levofloxacin, metformin, NSAID use.  Renal ultrasound showed mild persistent right hydronephrosis.  She passed a kidney stone while in the hospital.  This was not thought to be contributing to her acute kidney injury.  Creatinine peaked at 3.36.  The time of discharge it is coming down and is 2.61.  She has been instructed to discontinue all NSAIDs, metformin and fluoroquinolones.  Repeat urine culture shows significant yeast so she is being treated with fluconazole.  Mental status improved with correction of the hypocalcemia and hypomagnesemia.  At the time of discharge she is feeling  well.  She is hemodynamically stable.  She will have a repeat BMP in 1 week.  She will follow-up with urology, nephrology and PCP.    Insulin doses have been adjusted.  Because she is having to discontinue metformin she will now take Lantus and sliding scale insulin.  Goal would be to be able to get back to oral therapies, perhaps with metformin and SGLT2 inhibitor once her renal function has normalized.    Procedures Performed         Consults:   Consults       Date and Time Order Name Status Description    10/6/2023  6:47 AM Inpatient Urology Consult Completed     10/5/2023  1:59 PM Inpatient Nephrology Consult Completed     10/4/2023 10:11 PM Family Medicine Consult      9/19/2023  8:33 AM Inpatient Urology Consult Completed     9/18/2023  9:23 AM Inpatient Infectious Diseases Consult Completed             Pertinent Test Results:    Lab Results (most recent)       Procedure Component Value Units Date/Time    Renal Function Panel [[id number]]  (Abnormal) Collected: 10/07/23 0701    Specimen: Blood Updated: 10/07/23 0837     Glucose 180 mg/dL      BUN 36 mg/dL      Creatinine 2.61 mg/dL      Sodium 141 mmol/L      Potassium 4.2 mmol/L      Chloride 107 mmol/L      CO2 22.0 mmol/L      Calcium 8.4 mg/dL      Albumin 3.0 g/dL      Phosphorus 2.9 mg/dL      Anion Gap 12.0 mmol/L      BUN/Creatinine Ratio 13.8     eGFR 22.4 mL/min/1.73     Narrative:      GFR Normal >60  Chronic Kidney Disease <60  Kidney Failure <15      CBC & Differential [[id number]]  (Abnormal) Collected: 10/07/23 0701    Specimen: Blood Updated: 10/07/23 0757    Narrative:      The following orders were created for panel order CBC & Differential.  Procedure                               Abnormality         Status                     ---------                               -----------         ------                     CBC Auto Differential[[id number]]        Abnormal            Final result                 Please view results for these tests on the  individual orders.    CBC Auto Differential [[id number]]  (Abnormal) Collected: 10/07/23 0701    Specimen: Blood Updated: 10/07/23 0757     WBC 10.40 10*3/mm3      RBC 3.35 10*6/mm3      Hemoglobin 9.0 g/dL      Hematocrit 27.6 %      MCV 82.3 fL      MCH 26.7 pg      MCHC 32.5 g/dL      RDW 14.6 %      RDW-SD 42.4 fl      MPV 7.5 fL      Platelets 384 10*3/mm3      Neutrophil % 76.3 %      Lymphocyte % 11.8 %      Monocyte % 9.9 %      Eosinophil % 1.4 %      Basophil % 0.6 %      Neutrophils, Absolute 7.90 10*3/mm3      Lymphocytes, Absolute 1.20 10*3/mm3      Monocytes, Absolute 1.00 10*3/mm3      Eosinophils, Absolute 0.10 10*3/mm3      Basophils, Absolute 0.10 10*3/mm3      nRBC 0.1 /100 WBC     POC Glucose Once [[id number]]  (Abnormal) Collected: 10/07/23 0741    Specimen: Blood Updated: 10/07/23 0742     Glucose 171 mg/dL      Comment: Serial Number: [id number]:  [id number]       POC Glucose Once [[id number]]  (Abnormal) Collected: 10/06/23 1957    Specimen: Blood Updated: 10/06/23 1959     Glucose 279 mg/dL      Comment: Serial Number: [id number]:  [id number]       CK [[id number]]  (Normal) Collected: 10/05/23 2258    Specimen: Blood Updated: 10/06/23 1324     Creatine Kinase 38 U/L     Urine Culture - Urine, Urine, Clean Catch [[id number]] Collected: 10/04/23 2213    Specimen: Urine, Clean Catch Updated: 10/06/23 1018     Urine Culture <25,000 CFU/mL Normal Urogenital Adriana    Narrative:      Colonization of the urinary tract without infection is common. Treatment is discouraged unless the patient is symptomatic, pregnant, or undergoing an invasive urologic procedure.    Magnesium [[id number]]  (Normal) Collected: 10/05/23 2258    Specimen: Blood Updated: 10/06/23 0005     Magnesium 2.5 mg/dL     Renal Function Panel [[id number]]  (Abnormal) Collected: 10/05/23 2258    Specimen: Blood Updated: 10/06/23 0005     Glucose 248 mg/dL      BUN 38 mg/dL      Creatinine 3.36 mg/dL      Sodium 134 mmol/L       Potassium 4.8 mmol/L      Chloride 103 mmol/L      CO2 20.0 mmol/L      Calcium 8.1 mg/dL      Albumin 2.9 g/dL      Phosphorus 3.1 mg/dL      Anion Gap 11.0 mmol/L      BUN/Creatinine Ratio 11.3     eGFR 16.6 mL/min/1.73     Narrative:      GFR Normal >60  Chronic Kidney Disease <60  Kidney Failure <15      Uric Acid [[id number]]  (Normal) Collected: 10/05/23 2258    Specimen: Blood Updated: 10/06/23 0005     Uric Acid 5.5 mg/dL     Eosinophil Smear - Urine, Urine, Clean Catch [[id number]]  (Normal) Collected: 10/05/23 2201    Specimen: Urine, Clean Catch Updated: 10/05/23 2335     Eosinophil Smear 0 % EOS/100 Cells     CBC & Differential [[id number]]  (Abnormal) Collected: 10/05/23 2258    Specimen: Blood Updated: 10/05/23 2334    Narrative:      The following orders were created for panel order CBC & Differential.  Procedure                               Abnormality         Status                     ---------                               -----------         ------                     CBC Auto Differential[[id number]]        Abnormal            Final result                 Please view results for these tests on the individual orders.    CBC Auto Differential [[id number]]  (Abnormal) Collected: 10/05/23 2258    Specimen: Blood Updated: 10/05/23 2334     WBC 8.00 10*3/mm3      RBC 3.45 10*6/mm3      Hemoglobin 9.4 g/dL      Hematocrit 27.8 %      MCV 80.6 fL      MCH 27.1 pg      MCHC 33.7 g/dL      RDW 14.7 %      RDW-SD 44.2 fl      MPV 7.6 fL      Platelets 332 10*3/mm3      Neutrophil % 76.3 %      Lymphocyte % 11.7 %      Monocyte % 9.5 %      Eosinophil % 2.0 %      Basophil % 0.5 %      Neutrophils, Absolute 6.10 10*3/mm3      Lymphocytes, Absolute 0.90 10*3/mm3      Monocytes, Absolute 0.80 10*3/mm3      Eosinophils, Absolute 0.20 10*3/mm3      Basophils, Absolute 0.00 10*3/mm3      nRBC 0.1 /100 WBC     Magnesium [[id number]]  (Abnormal) Collected: 10/05/23 0822    Specimen: Blood Updated: 10/05/23 0920      Magnesium 3.4 mg/dL     Potassium [[id number]]  (Normal) Collected: 10/05/23 0822    Specimen: Blood Updated: 10/05/23 0916     Potassium 4.2 mmol/L     Basic Metabolic Panel [[id number]]  (Abnormal) Collected: 10/05/23 0822    Specimen: Blood Updated: 10/05/23 0916     Glucose 130 mg/dL      BUN 34 mg/dL      Creatinine 3.03 mg/dL      Sodium 138 mmol/L      Potassium 4.2 mmol/L      Chloride 101 mmol/L      CO2 22.0 mmol/L      Calcium 8.6 mg/dL      BUN/Creatinine Ratio 11.2     Anion Gap 15.0 mmol/L      eGFR 18.8 mL/min/1.73     Narrative:      GFR Normal >60  Chronic Kidney Disease <60  Kidney Failure <15      Urinalysis, Microscopic Only - Urine, Clean Catch [[id number]]  (Abnormal) Collected: 10/04/23 2213    Specimen: Urine, Clean Catch Updated: 10/04/23 2249     RBC, UA 0-2 /HPF      WBC, UA Too Numerous to Count /HPF      Bacteria, UA 2+ /HPF      Squamous Epithelial Cells, UA 13-20 /HPF      Yeast, UA Large/3+ Budding Yeast /HPF      Hyaline Casts, UA 3-6 /LPF      Granular Casts, UA 3-6 /LPF      Amorphous Urate Crystals, UA Moderate/2+ /HPF      Methodology Manual Light Microscopy    Urinalysis With Microscopic If Indicated (No Culture) - Urine, Clean Catch [[id number]]  (Abnormal) Collected: 10/04/23 2213    Specimen: Urine, Clean Catch Updated: 10/04/23 2227     Color, UA Yellow     Appearance, UA Turbid     pH, UA <=5.0     Specific Gravity, UA 1.017     Glucose, UA Negative     Ketones, UA Trace     Bilirubin, UA Negative     Blood, UA Negative     Protein,  mg/dL (2+)     Leuk Esterase, UA Moderate (2+)     Nitrite, UA Negative     Urobilinogen, UA 1.0 E.U./dL    Extra Tubes [[id number]] Collected: 10/04/23 2103    Specimen: Blood, Venous Line Updated: 10/04/23 2215    Narrative:      The following orders were created for panel order Extra Tubes.  Procedure                               Abnormality         Status                     ---------                               -----------          ------                     [facility][[id number]]                                   Final result               Light Blue Top[[id number]]                                   Final result                 Please view results for these tests on the individual orders.    Light Blue Top [[id number]] Collected: 10/04/23 2103    Specimen: Blood Updated: 10/04/23 2215     Extra Tube Hold for add-ons.     Comment: Auto resulted       Comprehensive Metabolic Panel [[id number]]  (Abnormal) Collected: 10/04/23 2103    Specimen: Blood Updated: 10/04/23 2149     Glucose 364 mg/dL      BUN 30 mg/dL      Creatinine 2.40 mg/dL      Sodium 135 mmol/L      Potassium 3.4 mmol/L      Chloride 95 mmol/L      CO2 22.0 mmol/L      Calcium 8.5 mg/dL      Total Protein 7.1 g/dL      Albumin 3.6 g/dL      ALT (SGPT) 5 U/L      AST (SGOT) 16 U/L      Alkaline Phosphatase 110 U/L      Total Bilirubin 0.3 mg/dL      Globulin 3.5 gm/dL      A/G Ratio 1.0 g/dL      BUN/Creatinine Ratio 12.5     Anion Gap 18.0 mmol/L      eGFR 24.8 mL/min/1.73     Narrative:      GFR Normal >60  Chronic Kidney Disease <60  Kidney Failure <15      [facility] [[id number]] Collected: 10/04/23 2103    Specimen: Blood Updated: 10/04/23 2135     Extra Tube hold                         Condition on Discharge: Improved, stable    Vital Signs  Temp:  [97.5 °F (36.4 °C)-98.6 °F (37 °C)] 98 °F (36.7 °C)  Heart Rate:  [86-95] 86  Resp:  [16-22] 16  BP: (122-137)/(73-88) 127/86    Physical Exam:     General Appearance:    Alert, cooperative, in no acute distress   Head:    Normocephalic, without obvious abnormality, atraumatic   Eyes:            Lids and lashes normal, conjunctivae and sclerae normal, no   icterus, no pallor, corneas clear, PERRLA   Ears:    Ears appear intact with no abnormalities noted   Throat:   No oral lesions, no thrush, oral mucosa moist   Neck:   No adenopathy, supple, trachea midline, no thyromegaly, no   carotid bruit, no JVD   Lungs:     Clear to  "auscultation,respirations regular, even and                  unlabored    Heart:    Regular rhythm and normal rate, normal S1 and S2, no            murmur, no gallop, no rub, no click   Chest Wall:    No abnormalities observed   Abdomen:     Normal bowel sounds, no masses, no organomegaly, soft        non-tender, non-distended, no guarding, no rebound                tenderness   Extremities:   Moves all extremities well, no edema, no cyanosis, no             redness   Pulses:   Pulses palpable and equal bilaterally   Skin:   No bleeding, bruising or rash   Lymph nodes:   No palpable adenopathy   Neurologic:   Cranial nerves 2 - 12 grossly intact, sensation intact, DTR       present and equal bilaterally       Discharge Disposition  Home or Self Care    Discharge Medications     Discharge Medications        New Medications        Instructions Start Date   fluconazole 100 MG tablet  Commonly known as: DIFLUCAN   100 mg, Oral, Every 24 Hours Scheduled   Start Date: October 8, 2023     insulin glargine 100 UNIT/ML injection  Commonly known as: Lantus  Replaces: Insulin Glargine 100 UNIT/ML injection pen   25 Units, Subcutaneous, Daily      Insulin Lispro 100 UNIT/ML injection  Commonly known as: HumaLOG   Use sliding scale before meals: Blood sugar 150-199 =2 units; 200-249=4 u; 250-299=6 u; 300-349=7u; 350-399=8u; 400 and above=9u      Insulin Syringe 29G X 1/2\" 0.5 ML misc   1 each, Does not apply, 4 Times Daily PRN, May substitute any equivalent insulin syringe      methocarbamol 500 MG tablet  Commonly known as: ROBAXIN   500 mg, Oral, Every 8 Hours PRN      sodium bicarbonate 650 MG tablet   1,300 mg, Oral, 3 Times Daily             Changes to Medications        Instructions Start Date   gabapentin 300 MG capsule  Commonly known as: NEURONTIN  What changed:   medication strength  how much to take   300 mg, Oral, 3 Times Daily             Continue These Medications        Instructions Start Date   acetaminophen 325 " MG tablet  Commonly known as: TYLENOL   650 mg, Oral, Every 4 Hours PRN      cyclobenzaprine 10 MG tablet  Commonly known as: FLEXERIL   10 mg, Oral, Nightly PRN      methylphenidate 54 MG CR tablet   54 mg, Oral, Every Morning      rosuvastatin 20 MG tablet  Commonly known as: CRESTOR   20 mg, Oral, Daily      Trokendi  MG capsule sustained-release 24 hr  Generic drug: Topiramate ER   1 capsule, Oral, Daily             Stop These Medications      Insulin Glargine 100 UNIT/ML injection pen  Commonly known as: LANTUS SOLOSTAR  Replaced by: insulin glargine 100 UNIT/ML injection     metFORMIN 1000 MG tablet  Commonly known as: GLUCOPHAGE     Mounjaro 5 MG/0.5ML solution pen-injector  Generic drug: Tirzepatide     nabumetone 750 MG tablet  Commonly known as: RELAFEN     naproxen sodium 220 MG tablet  Commonly known as: ALEVE              Discharge Diet: Low concentrated sweets    Activity at Discharge: No restriction    Follow-up Appointments  No future appointments.  Additional Instructions for the Follow-ups that You Need to Schedule       Discharge Follow-up with PCP   As directed       Currently Documented PCP:    Akash Weiner MD    PCP Phone Number:    [phone]     Follow Up Details: [facility] will contact you with details of follow up appointment with Aleah Rea.        Discharge Follow-up with Specialty: Nephrology; 3 Weeks   As directed      Specialty: Nephrology   Follow Up: 3 Weeks        Discharge Follow-up with Specified Provider: Dr. Askew; 3 Weeks   As directed      To: Dr. Askew   Follow Up: 3 Weeks        Discharge Follow-up with Specified Provider: [facility]; 1 Month   As directed      To: [facility]   Follow Up: 1 Month        Renal Function Panel    Oct 12, 2023 (Approximate)      Release to patient: Routine Release                Test Results Pending at Discharge       Micaela Isabel MD  10/07/23  12:35 EDT    Time: Discharge 25 min

## 2023-10-07 NOTE — PLAN OF CARE
Goal Outcome Evaluation:              Outcome Evaluation: Pt resting well in bed without complaints, continue IVF and will d/c home when stable.

## 2023-10-07 NOTE — PLAN OF CARE
Patient is alert and oriented with no complaints of pain or discomfort.  Will continue to monitor labs to see if patient to d/c home.   Problem: Adult Inpatient Plan of Care  Goal: Plan of Care Review  Outcome: Ongoing, Progressing  Flowsheets  Taken 10/7/2023 0721 by Debra Simpson RN  Progress: improving  Plan of Care Reviewed With: patient  Taken 10/7/2023 0031 by Luma Gomez LPN  Outcome Evaluation: Pt resting well in bed without complaints, continue IVF and will d/c home when stable.  Goal: Patient-Specific Goal (Individualized)  Outcome: Ongoing, Progressing  Goal: Absence of Hospital-Acquired Illness or Injury  Outcome: Ongoing, Progressing  Intervention: Identify and Manage Fall Risk  Flowsheets (Taken 10/7/2023 0721)  Safety Promotion/Fall Prevention:   clutter free environment maintained   assistive device/personal items within reach   safety round/check completed  Intervention: Prevent Skin Injury  Flowsheets  Taken 10/6/2023 2300 by Sukumar, Dana, PCT  Body Position: position changed independently  Taken 10/6/2023 2000 by Luma Gomez LPN  Skin Protection: adhesive use limited  Intervention: Prevent and Manage VTE (Venous Thromboembolism) Risk  Flowsheets  Taken 10/6/2023 2300 by Dana Patino PCT  Activity Management: up ad neida  Taken 10/5/2023 2054 by Gaye Steve RN  VTE Prevention/Management:   bilateral   sequential compression devices off  Intervention: Prevent Infection  Flowsheets (Taken 10/7/2023 0721)  Infection Prevention:   hand hygiene promoted   personal protective equipment utilized   rest/sleep promoted   single patient room provided  Goal: Optimal Comfort and Wellbeing  Outcome: Ongoing, Progressing  Intervention: Monitor Pain and Promote Comfort  Flowsheets (Taken 10/6/2023 2000 by Luma Gomez LPN)  Pain Management Interventions: see MAR  Intervention: Provide Person-Centered Care  Flowsheets (Taken 10/6/2023 2000 by Luma Gomez LPN)  Trust  Relationship/Rapport:   care explained   questions answered   questions encouraged  Goal: Readiness for Transition of Care  Outcome: Ongoing, Progressing     Problem: Diabetes Comorbidity  Goal: Blood Glucose Level Within Targeted Range  Outcome: Ongoing, Progressing     Problem: Confusion Acute  Goal: Optimal Cognitive Function  Outcome: Ongoing, Progressing   Goal Outcome Evaluation:  Plan of Care Reviewed With: patient        Progress: improving

## 2023-10-08 NOTE — CASE MANAGEMENT/SOCIAL WORK
Case Management Discharge Note      Final Note: home               Transportation Services  Private: Car    Final Discharge Disposition Code: 01 - home or self-care

## 2023-10-11 ENCOUNTER — READMISSION MANAGEMENT (OUTPATIENT)
Dept: CALL CENTER | Facility: HOSPITAL | Age: 43
End: 2023-10-11
Payer: COMMERCIAL

## 2023-10-11 NOTE — OUTREACH NOTE
Medical Week 1 Survey      Flowsheet Row Responses   [facility] patient discharged from? [facility]   Does the patient have one of the following disease processes/diagnoses(primary or secondary)? Other   Week 1 attempt successful? Yes   Call start time 0852   Call end time 0853   Discharge diagnosis **Altered mental status   Meds reviewed with patient/caregiver? Yes   Is the patient having any side effects they believe may be caused by any medication additions or changes? No   Does the patient have all medications ordered at discharge? Yes   Is the patient taking all medications as directed (includes completed medication regime)? Yes   Does the patient have a primary care provider?  Yes   Does the patient have an appointment with their PCP within 7 days of discharge? Yes   Has the patient kept scheduled appointments due by today? N/A   Has home health visited the patient within 72 hours of discharge? N/A   Psychosocial issues? No   Did the patient receive a copy of their discharge instructions? Yes   Nursing interventions Reviewed instructions with patient   What is the patient's perception of their health status since discharge? Improving   Is the patient/caregiver able to teach back signs and symptoms related to disease process for when to call PCP? Yes   Is the patient/caregiver able to teach back signs and symptoms related to disease process for when to call 911? Yes   Is the patient/caregiver able to teach back the hierarchy of who to call/visit for symptoms/problems? PCP, Specialist, Home health nurse, Urgent Care, ED, 911 Yes   Week 1 call completed? Yes   Wrap up additional comments Pt reports she is doing well. Aware of med changes.   Call end time 0853            MARLON BRIZUELA - Registered Nurse

## 2024-02-05 ENCOUNTER — APPOINTMENT (OUTPATIENT)
Dept: CT IMAGING | Facility: HOSPITAL | Age: 44
End: 2024-02-05
Payer: COMMERCIAL

## 2024-02-05 ENCOUNTER — HOSPITAL ENCOUNTER (EMERGENCY)
Facility: HOSPITAL | Age: 44
Discharge: HOME | End: 2024-02-05
Attending: EMERGENCY MEDICINE | Admitting: EMERGENCY MEDICINE
Payer: COMMERCIAL

## 2024-02-05 VITALS
HEART RATE: 103 BPM | HEIGHT: 70 IN | OXYGEN SATURATION: 98 % | BODY MASS INDEX: 25.06 KG/M2 | TEMPERATURE: 97.1 F | DIASTOLIC BLOOD PRESSURE: 95 MMHG | SYSTOLIC BLOOD PRESSURE: 147 MMHG | RESPIRATION RATE: 18 BRPM | WEIGHT: 175.04 LBS

## 2024-02-05 DIAGNOSIS — R10.84 GENERALIZED ABDOMINAL PAIN: Primary | ICD-10-CM

## 2024-02-05 DIAGNOSIS — R11.2 NAUSEA AND VOMITING, UNSPECIFIED VOMITING TYPE: ICD-10-CM

## 2024-02-05 LAB
ALBUMIN SERPL-MCNC: 4.1 G/DL (ref 3.5–5.2)
ALBUMIN/GLOB SERPL: 1.2 G/DL
ALP SERPL-CCNC: 107 U/L (ref 39–117)
ALT SERPL W P-5'-P-CCNC: 39 U/L (ref 1–33)
ANION GAP SERPL CALCULATED.3IONS-SCNC: 15 MMOL/L (ref 5–15)
AST SERPL-CCNC: 13 U/L (ref 1–32)
BASOPHILS # BLD AUTO: 0.1 10*3/MM3 (ref 0–0.2)
BASOPHILS NFR BLD AUTO: 0.8 % (ref 0–1.5)
BILIRUB SERPL-MCNC: 0.4 MG/DL (ref 0–1.2)
BILIRUB UR QL STRIP: NEGATIVE
BUN SERPL-MCNC: 16 MG/DL (ref 6–20)
BUN/CREAT SERPL: 27.6 (ref 7–25)
CALCIUM SPEC-SCNC: 9.6 MG/DL (ref 8.6–10.5)
CHLORIDE SERPL-SCNC: 96 MMOL/L (ref 98–107)
CLARITY UR: CLEAR
CO2 SERPL-SCNC: 22 MMOL/L (ref 22–29)
COLOR UR: YELLOW
CREAT SERPL-MCNC: 0.58 MG/DL (ref 0.57–1)
DEPRECATED RDW RBC AUTO: 40.3 FL (ref 37–54)
EGFRCR SERPLBLD CKD-EPI 2021: 113.9 ML/MIN/1.73
EOSINOPHIL # BLD AUTO: 0.1 10*3/MM3 (ref 0–0.4)
EOSINOPHIL NFR BLD AUTO: 0.8 % (ref 0.3–6.2)
ERYTHROCYTE [DISTWIDTH] IN BLOOD BY AUTOMATED COUNT: 13.4 % (ref 12.3–15.4)
GLOBULIN UR ELPH-MCNC: 3.3 GM/DL
GLUCOSE SERPL-MCNC: 174 MG/DL (ref 65–99)
GLUCOSE UR STRIP-MCNC: NEGATIVE MG/DL
HCT VFR BLD AUTO: 36.7 % (ref 34–46.6)
HGB BLD-MCNC: 11.5 G/DL (ref 12–15.9)
HGB UR QL STRIP.AUTO: NEGATIVE
HOLD SPECIMEN: NORMAL
KETONES UR QL STRIP: ABNORMAL
LEUKOCYTE ESTERASE UR QL STRIP.AUTO: NEGATIVE
LIPASE SERPL-CCNC: 18 U/L (ref 13–60)
LYMPHOCYTES # BLD AUTO: 2.2 10*3/MM3 (ref 0.7–3.1)
LYMPHOCYTES NFR BLD AUTO: 15.6 % (ref 19.6–45.3)
MCH RBC QN AUTO: 25.2 PG (ref 26.6–33)
MCHC RBC AUTO-ENTMCNC: 31.4 G/DL (ref 31.5–35.7)
MCV RBC AUTO: 80.2 FL (ref 79–97)
MONOCYTES # BLD AUTO: 1 10*3/MM3 (ref 0.1–0.9)
MONOCYTES NFR BLD AUTO: 7.4 % (ref 5–12)
NEUTROPHILS NFR BLD AUTO: 10.5 10*3/MM3 (ref 1.7–7)
NEUTROPHILS NFR BLD AUTO: 75.4 % (ref 42.7–76)
NITRITE UR QL STRIP: NEGATIVE
NRBC BLD AUTO-RTO: 0 /100 WBC (ref 0–0.2)
PH UR STRIP.AUTO: 7.5 [PH] (ref 5–8)
PLATELET # BLD AUTO: 439 10*3/MM3 (ref 140–450)
PMV BLD AUTO: 7.5 FL (ref 6–12)
POTASSIUM SERPL-SCNC: 3.2 MMOL/L (ref 3.5–5.2)
PROT SERPL-MCNC: 7.4 G/DL (ref 6–8.5)
PROT UR QL STRIP: ABNORMAL
RBC # BLD AUTO: 4.58 10*6/MM3 (ref 3.77–5.28)
SODIUM SERPL-SCNC: 133 MMOL/L (ref 136–145)
SP GR UR STRIP: 1.02 (ref 1–1.03)
UROBILINOGEN UR QL STRIP: ABNORMAL
WBC NRBC COR # BLD AUTO: 14 10*3/MM3 (ref 3.4–10.8)
WHOLE BLOOD HOLD COAG: NORMAL
WHOLE BLOOD HOLD SPECIMEN: NORMAL

## 2024-02-05 PROCEDURE — 83690 ASSAY OF LIPASE: CPT | Performed by: PHYSICIAN ASSISTANT

## 2024-02-05 PROCEDURE — 99284 EMERGENCY DEPT VISIT MOD MDM: CPT

## 2024-02-05 PROCEDURE — 85025 COMPLETE CBC W/AUTO DIFF WBC: CPT | Performed by: PHYSICIAN ASSISTANT

## 2024-02-05 PROCEDURE — 80053 COMPREHEN METABOLIC PANEL: CPT | Performed by: PHYSICIAN ASSISTANT

## 2024-02-05 PROCEDURE — 74176 CT ABD & PELVIS W/O CONTRAST: CPT

## 2024-02-05 PROCEDURE — 81003 URINALYSIS AUTO W/O SCOPE: CPT | Performed by: PHYSICIAN ASSISTANT

## 2024-02-05 RX ORDER — ONDANSETRON 4 MG/1
4 TABLET, ORALLY DISINTEGRATING ORAL EVERY 8 HOURS PRN
Qty: 12 TABLET | Refills: 0 | Status: SHIPPED | OUTPATIENT
Start: 2024-02-05

## 2024-02-05 RX ORDER — TRAMADOL HYDROCHLORIDE 50 MG/1
50 TABLET ORAL EVERY 6 HOURS PRN
Qty: 12 TABLET | Refills: 0 | Status: SHIPPED | OUTPATIENT
Start: 2024-02-05

## 2024-02-05 RX ORDER — SODIUM CHLORIDE 0.9 % (FLUSH) 0.9 %
10 SYRINGE (ML) INJECTION AS NEEDED
Status: DISCONTINUED | OUTPATIENT
Start: 2024-02-05 | End: 2024-02-05 | Stop reason: HOSPADM

## 2024-02-06 NOTE — ED NOTES
..Patient evaluated by provider and will return to waiting room with Intravenous line in place.  Patient has been instructed not to inject anything into IV, or leave premises with line in place. Patient pending further evaluation, treatment, testing / monitoring.

## 2024-02-06 NOTE — ED PROVIDER NOTES
Subjective Due to significant overcrowding in the emergency department patient was initially seen and evaluated in triage.  Provider in triage recommended patient placement in the treatment area to initiate therapy and movement to an ER bed as soon as possible.    Provider in Triage Note  Patient is a 43-year-old female who presents with right-sided abdominal pain radiating to her right shoulder over the last 6 days.  She reports last Tuesday she had intractable nausea vomiting for 5 hours.  Since that time she has had increasing right-sided flank and upper pain.      History of Present Illness  I interviewed the patient HPI and agree with the nurse practitioner providing triage note as noted above  Review of Systems    Past Medical History:   Diagnosis Date    DDD (degenerative disc disease), cervical     Diabetes mellitus     PCOS (polycystic ovarian syndrome)     Porphyria        Allergies   Allergen Reactions    Adhesive Tape Other (See Comments)    Cephalosporins Swelling       Past Surgical History:   Procedure Laterality Date    CERVICAL SPINE SURGERY      CYSTOSCOPY, URETEROSCOPY, RETROGRADE PYELOGRAM, STENT INSERTION Right 9/20/2023    Procedure: CYSTOSCOPY URETEROSCOPY STONE BASKET EXTRACTION HOLMIUM LASER STENT INSERTION;  Surgeon: Cory Phelps MD;  Location: [facility] OR;  Service: Urology;  Laterality: Right;       Family History   Problem Relation Age of Onset    No Known Problems Mother     No Known Problems Father     No Known Problems Sister     No Known Problems Brother     No Known Problems Maternal Aunt     No Known Problems Maternal Uncle     No Known Problems Paternal Aunt     No Known Problems Paternal Uncle     No Known Problems Maternal Grandmother     No Known Problems Maternal Grandfather     No Known Problems Paternal Grandmother     No Known Problems Paternal Grandfather     No Known Problems Other     Anemia Neg Hx     Arrhythmia Neg Hx     Asthma Neg Hx     Clotting disorder Neg Hx      Fainting Neg Hx     Heart attack Neg Hx     Heart disease Neg Hx     Heart failure Neg Hx     Hyperlipidemia Neg Hx     Hypertension Neg Hx        Social History     Socioeconomic History    Marital status: Single   Tobacco Use    Smoking status: Former     Types: Electronic Cigarette    Smokeless tobacco: Never   Vaping Use    Vaping Use: Former   Substance and Sexual Activity    Alcohol use: Yes     Comment: occassionally    Drug use: Never    Sexual activity: Defer           Objective   Physical Exam  Neck has no adenopathy JVD or bruits.  Lungs clear.  Heart has regular rhythm without murmur.  Chest nontender.  Ab soft with minimal diffuse tenderness.  Patient with normal bowel sounds without rebound or guarding.  Back has no CVA tenderness.  Extremity exam unremarkable.  Procedures           ED Course      Results for orders placed or performed during the hospital encounter of 02/05/24   Comprehensive Metabolic Panel    Specimen: Blood   Result Value Ref Range    Glucose 174 (H) 65 - 99 mg/dL    BUN 16 6 - 20 mg/dL    Creatinine 0.58 0.57 - 1.00 mg/dL    Sodium 133 (L) 136 - 145 mmol/L    Potassium 3.2 (L) 3.5 - 5.2 mmol/L    Chloride 96 (L) 98 - 107 mmol/L    CO2 22.0 22.0 - 29.0 mmol/L    Calcium 9.6 8.6 - 10.5 mg/dL    Total Protein 7.4 6.0 - 8.5 g/dL    Albumin 4.1 3.5 - 5.2 g/dL    ALT (SGPT) 39 (H) 1 - 33 U/L    AST (SGOT) 13 1 - 32 U/L    Alkaline Phosphatase 107 39 - 117 U/L    Total Bilirubin 0.4 0.0 - 1.2 mg/dL    Globulin 3.3 gm/dL    A/G Ratio 1.2 g/dL    BUN/Creatinine Ratio 27.6 (H) 7.0 - 25.0    Anion Gap 15.0 5.0 - 15.0 mmol/L    eGFR 113.9 >60.0 mL/min/1.73   Lipase    Specimen: Blood   Result Value Ref Range    Lipase 18 13 - 60 U/L   Urinalysis With Microscopic If Indicated (No Culture) - Urine, Clean Catch    Specimen: Urine, Clean Catch   Result Value Ref Range    Color, UA Yellow Yellow, Straw    Appearance, UA Clear Clear    pH, UA 7.5 5.0 - 8.0    Specific Gravity, UA 1.019 1.005 -  1.030    Glucose, UA Negative Negative    Ketones, UA Trace (A) Negative    Bilirubin, UA Negative Negative    Blood, UA Negative Negative    Protein, UA Trace (A) Negative    Leuk Esterase, UA Negative Negative    Nitrite, UA Negative Negative    Urobilinogen, UA 1.0 E.U./dL 0.2 - 1.0 E.U./dL   CBC Auto Differential    Specimen: Blood   Result Value Ref Range    WBC 14.00 (H) 3.40 - 10.80 10*3/mm3    RBC 4.58 3.77 - 5.28 10*6/mm3    Hemoglobin 11.5 (L) 12.0 - 15.9 g/dL    Hematocrit 36.7 34.0 - 46.6 %    MCV 80.2 79.0 - 97.0 fL    MCH 25.2 (L) 26.6 - 33.0 pg    MCHC 31.4 (L) 31.5 - 35.7 g/dL    RDW 13.4 12.3 - 15.4 %    RDW-SD 40.3 37.0 - 54.0 fl    MPV 7.5 6.0 - 12.0 fL    Platelets 439 140 - 450 10*3/mm3    Neutrophil % 75.4 42.7 - 76.0 %    Lymphocyte % 15.6 (L) 19.6 - 45.3 %    Monocyte % 7.4 5.0 - 12.0 %    Eosinophil % 0.8 0.3 - 6.2 %    Basophil % 0.8 0.0 - 1.5 %    Neutrophils, Absolute 10.50 (H) 1.70 - 7.00 10*3/mm3    Lymphocytes, Absolute 2.20 0.70 - 3.10 10*3/mm3    Monocytes, Absolute 1.00 (H) 0.10 - 0.90 10*3/mm3    Eosinophils, Absolute 0.10 0.00 - 0.40 10*3/mm3    Basophils, Absolute 0.10 0.00 - 0.20 10*3/mm3    nRBC 0.0 0.0 - 0.2 /100 WBC   Lavender Top   Result Value Ref Range    Extra Tube hold for add-on    Gold Top - SST   Result Value Ref Range    Extra Tube Hold for add-ons.    Light Blue Top   Result Value Ref Range    Extra Tube Hold for add-ons.      CT Abdomen Pelvis Without Contrast    Result Date: 2/5/2024  Normal appendix. Cholelithiasis without evidence of cholecystitis. No ureteric calculi. Nonobstructing left renal calculi. Electronically Signed: Agustin Liang MD  2/5/2024 8:52 PM EST  Workstation ID: [id number]                                            Medical Decision Making  My interpretation patient CT scan and pelvis without contrast shows cholelithiasis without other acute abnormality.  BMP shows no renal insufficiency or electrolyte abnormality.  Glucose is borderline elevated  at 174.  There is no evidence of hepatitis with normal LFTs.  Lipase is normal.  UA is normal as well.  She does have mild leukocytosis with no left shift and no anemia.  Patient be discharged with a prescription for Ultram and Zofran.  She will see MD for recheck as needed.    Amount and/or Complexity of Data Reviewed  Labs: ordered. Decision-making details documented in ED Course.  Radiology: ordered and independent interpretation performed.    Risk  Prescription drug management.        Final diagnoses:   Generalized abdominal pain   Nausea and vomiting, unspecified vomiting type       ED Disposition  ED Disposition       ED Disposition   Discharge    Condition   Stable    Comment   --               No follow-up provider specified.       Medication List        New Prescriptions      ondansetron ODT 4 MG disintegrating tablet  Commonly known as: ZOFRAN-ODT  Place 1 tablet on the tongue Every 8 (Eight) Hours As Needed for Vomiting.     traMADol 50 MG tablet  Commonly known as: ULTRAM  Take 1 tablet by mouth Every 6 (Six) Hours As Needed for Severe Pain.               Where to Get Your Medications        These medications were sent to [facility] [id number] - [place] [address] [address]  - [phone]  - [phone] [address] [place] [address]      Phone: [phone]   ondansetron ODT 4 MG disintegrating tablet  traMADol 50 MG tablet            Craig Martin MD  02/05/24 3502

## 2024-07-05 ENCOUNTER — HOSPITAL ENCOUNTER (OUTPATIENT)
Dept: CARDIOLOGY | Facility: HOSPITAL | Age: 44
Discharge: HOME | End: 2024-07-05
Payer: COMMERCIAL

## 2024-07-05 ENCOUNTER — LAB (OUTPATIENT)
Dept: LAB | Facility: HOSPITAL | Age: 44
End: 2024-07-05
Payer: COMMERCIAL

## 2024-07-05 ENCOUNTER — TRANSCRIBE ORDERS (OUTPATIENT)
Dept: ADMINISTRATIVE | Facility: HOSPITAL | Age: 44
End: 2024-07-05
Payer: COMMERCIAL

## 2024-07-05 DIAGNOSIS — N20.0 KIDNEY STONE: ICD-10-CM

## 2024-07-05 DIAGNOSIS — N20.0 KIDNEY STONE: Primary | ICD-10-CM

## 2024-07-05 LAB
ANION GAP SERPL CALCULATED.3IONS-SCNC: 13.8 MMOL/L (ref 5–15)
BASOPHILS # BLD AUTO: 0.08 10*3/MM3 (ref 0–0.2)
BASOPHILS NFR BLD AUTO: 0.7 % (ref 0–1.5)
BUN SERPL-MCNC: 15 MG/DL (ref 6–20)
BUN/CREAT SERPL: 24.6 (ref 7–25)
CALCIUM SPEC-SCNC: 9.7 MG/DL (ref 8.6–10.5)
CHLORIDE SERPL-SCNC: 105 MMOL/L (ref 98–107)
CO2 SERPL-SCNC: 20.2 MMOL/L (ref 22–29)
CREAT SERPL-MCNC: 0.61 MG/DL (ref 0.57–1)
DEPRECATED RDW RBC AUTO: 46.3 FL (ref 37–54)
EGFRCR SERPLBLD CKD-EPI 2021: 111.8 ML/MIN/1.73
EOSINOPHIL # BLD AUTO: 0.12 10*3/MM3 (ref 0–0.4)
EOSINOPHIL NFR BLD AUTO: 1.1 % (ref 0.3–6.2)
ERYTHROCYTE [DISTWIDTH] IN BLOOD BY AUTOMATED COUNT: 14.8 % (ref 12.3–15.4)
GLUCOSE SERPL-MCNC: 111 MG/DL (ref 65–99)
HCT VFR BLD AUTO: 42.7 % (ref 34–46.6)
HGB BLD-MCNC: 13.1 G/DL (ref 12–15.9)
IMM GRANULOCYTES # BLD AUTO: 0.07 10*3/MM3 (ref 0–0.05)
IMM GRANULOCYTES NFR BLD AUTO: 0.6 % (ref 0–0.5)
LYMPHOCYTES # BLD AUTO: 2.5 10*3/MM3 (ref 0.7–3.1)
LYMPHOCYTES NFR BLD AUTO: 22.9 % (ref 19.6–45.3)
MCH RBC QN AUTO: 26.5 PG (ref 26.6–33)
MCHC RBC AUTO-ENTMCNC: 30.7 G/DL (ref 31.5–35.7)
MCV RBC AUTO: 86.3 FL (ref 79–97)
MONOCYTES # BLD AUTO: 0.73 10*3/MM3 (ref 0.1–0.9)
MONOCYTES NFR BLD AUTO: 6.7 % (ref 5–12)
NEUTROPHILS NFR BLD AUTO: 68 % (ref 42.7–76)
NEUTROPHILS NFR BLD AUTO: 7.41 10*3/MM3 (ref 1.7–7)
NRBC BLD AUTO-RTO: 0 /100 WBC (ref 0–0.2)
PLATELET # BLD AUTO: 295 10*3/MM3 (ref 140–450)
PMV BLD AUTO: 10 FL (ref 6–12)
POTASSIUM SERPL-SCNC: 3.9 MMOL/L (ref 3.5–5.2)
RBC # BLD AUTO: 4.95 10*6/MM3 (ref 3.77–5.28)
SODIUM SERPL-SCNC: 139 MMOL/L (ref 136–145)
WBC NRBC COR # BLD AUTO: 10.91 10*3/MM3 (ref 3.4–10.8)

## 2024-07-05 PROCEDURE — 36415 COLL VENOUS BLD VENIPUNCTURE: CPT

## 2024-07-05 PROCEDURE — 93005 ELECTROCARDIOGRAM TRACING: CPT | Performed by: UROLOGY

## 2024-07-05 PROCEDURE — 85025 COMPLETE CBC W/AUTO DIFF WBC: CPT

## 2024-07-05 PROCEDURE — 80048 BASIC METABOLIC PNL TOTAL CA: CPT

## 2024-07-06 LAB
QT INTERVAL: 300 MS
QTC INTERVAL: 397 MS

## 2024-07-09 ENCOUNTER — CONSULT (OUTPATIENT)
Dept: CARDIOLOGY | Facility: CLINIC | Age: 44
End: 2024-07-09
Payer: COMMERCIAL

## 2024-07-09 VITALS
OXYGEN SATURATION: 98 % | WEIGHT: 186 LBS | BODY MASS INDEX: 26.63 KG/M2 | SYSTOLIC BLOOD PRESSURE: 112 MMHG | DIASTOLIC BLOOD PRESSURE: 80 MMHG | HEIGHT: 70 IN | HEART RATE: 116 BPM

## 2024-07-09 DIAGNOSIS — I10 PRIMARY HYPERTENSION: ICD-10-CM

## 2024-07-09 DIAGNOSIS — E80.0 ERYTHROPOIETIC PORPHYRIA: ICD-10-CM

## 2024-07-09 DIAGNOSIS — N20.0 KIDNEY STONE: ICD-10-CM

## 2024-07-09 DIAGNOSIS — E78.2 MIXED HYPERLIPIDEMIA: ICD-10-CM

## 2024-07-09 DIAGNOSIS — Z01.810 PRE-OPERATIVE CARDIOVASCULAR EXAMINATION: Primary | ICD-10-CM

## 2024-07-09 PROCEDURE — 99203 OFFICE O/P NEW LOW 30 MIN: CPT | Performed by: INTERNAL MEDICINE

## 2024-07-09 PROCEDURE — 93000 ELECTROCARDIOGRAM COMPLETE: CPT | Performed by: INTERNAL MEDICINE

## 2024-07-09 RX ORDER — LISINOPRIL 5 MG/1
TABLET ORAL
COMMUNITY
Start: 2024-06-24

## 2024-07-09 RX ORDER — PIOGLITAZONEHYDROCHLORIDE 15 MG/1
15 TABLET ORAL DAILY
COMMUNITY
Start: 2024-05-16 | End: 2024-07-09

## 2024-07-09 RX ORDER — TOPIRAMATE 100 MG/1
100 TABLET, FILM COATED ORAL 2 TIMES DAILY
COMMUNITY

## 2024-07-09 NOTE — PROGRESS NOTES
Cardiology Office Visit      Encounter Date:  07/09/2024    Patient ID:   Nora Tam is a 44 y.o. female.    Reason For Followup:  Preop evaluation for urological procedure  Prior history of ectopic atrial tachycardia    Brief Clinical History:  Dear Dr. Rea, ANURADHA Walton    I had the pleasure of seeing Nora Tam today. As you are well aware, this is a 44 y.o. female has history of palpitations and fatigue and underwent a prior EP study and was felt to have poorly functioning sinus node and from her description looks like she had  attempted EP study for sinus node modification which was not done as per her description--she comes in for 6-month follow-up with no new symptoms  She has history of ADHD, hypertension and diabetes  She also has porphyria cutanea tarda  She has symptoms of peripheral neuropathy on gabapentin  She also has hyperlipidemia    Interval History:  Denies any exertional symptoms of chest pain shortness of breath dizziness or syncope  No heart failure symptoms  No prior coronary artery disease  Long history of ectopic atrial tachycardia with prior intolerance to beta-blockers      Assessment & Plan    Impressions:  Hypertension  Hyperlipidemia  Diabetes mellitus  History of ectopic atrial tachycardia  Porphyria cutanea tarda  Kidney stones for preop evaluation      Recommendations:  Prior workup and available medical records reviewed and discussed patient  Prior echocardiogram with normal LV systolic function  Patient had long history of ectopic atrial tachycardia with no significant change in the cardiac condition and no new cardiac symptoms  From cardiac standpoint patient is stable to undergo the planned surgical procedure  I do not believe she needs any further cardiac workup at this time  Patient is advised to follow-up with the EP as an outpatient for further evaluation treatment options for ectopic atrial tachycardia  Patient is also advised to consider vascular  "screening and calcium score for further risk stratification secondary to multiple risk factors  Test results and treatment options risk benefits and alternatives reviewed and discussed with patient  Schedule a follow-up appointment with EP in the next few months          Vitals:  Vitals:    07/09/24 1136   BP: 112/80   Pulse: 116   SpO2: 98%   Weight: 84.4 kg (186 lb)   Height: 177.8 cm (70\")       Physical Exam:    General: Alert, cooperative, no distress, appears stated age  Head:  Normocephalic, atraumatic, mucous membranes moist  Eyes:  Conjunctiva/corneas clear, EOM's intact     Neck:  Supple,  no adenopathy;      Lungs: Clear to auscultation bilaterally, no wheezes rhonchi rales are noted  Chest wall: No tenderness  Heart::  Regular rate and rhythm, S1 and S2 normal, no murmur, rub or gallop  Abdomen: Soft, non-tender, nondistended bowel sounds active  Extremities: No cyanosis, clubbing, or edema  Pulses: 2+ and symmetric all extremities  Skin:  No rashes or lesions  Neuro/psych: A&O x3. CN II through XII are grossly intact with appropriate affect              Lab Results   Component Value Date    GLUCOSE 111 (H) 07/05/2024    BUN 15 07/05/2024    CREATININE 0.61 07/05/2024    EGFR 111.8 07/05/2024    BCR 24.6 07/05/2024    K 3.9 07/05/2024    CO2 20.2 (L) 07/05/2024    CALCIUM 9.7 07/05/2024    ALBUMIN 4.1 02/05/2024    BILITOT 0.4 02/05/2024    AST 13 02/05/2024    ALT 39 (H) 02/05/2024        No results found for this or any previous visit.     No results found for: \"CHOL\", \"CHLPL\", \"TRIG\", \"HDL\", \"LDL\", \"LDLDIRECT\"             Objective:          Allergies:  Allergies   Allergen Reactions    Adhesive Tape Other (See Comments)    Cephalosporins Swelling       Medication Review:     Current Outpatient Medications:     acetaminophen (TYLENOL) 325 MG tablet, Take 2 tablets by mouth Every 4 (Four) Hours As Needed for Mild Pain., Disp: , Rfl:     gabapentin (NEURONTIN) 300 MG capsule, Take 1 capsule by mouth 3 " "(Three) Times a Day., Disp: 90 capsule, Rfl: 0    lisinopril (PRINIVIL,ZESTRIL) 5 MG tablet, , Disp: , Rfl:     metFORMIN (GLUCOPHAGE) 1000 MG tablet, , Disp: , Rfl:     methylphenidate 54 MG CR tablet, Take 1 tablet by mouth Every Morning, Disp: , Rfl:     rosuvastatin (CRESTOR) 20 MG tablet, Take 1 tablet by mouth Daily., Disp: , Rfl:     sertraline (ZOLOFT) 50 MG tablet, , Disp: , Rfl:     Tirzepatide (MOUNJARO) 5 MG/0.5ML solution pen-injector pen, Inject 0.5 mL under the skin into the appropriate area as directed Every 7 (Seven) Days., Disp: , Rfl:     topiramate (TOPAMAX) 100 MG tablet, Take 1 tablet by mouth 2 (Two) Times a Day., Disp: , Rfl:     traMADol (ULTRAM) 50 MG tablet, Take 1 tablet by mouth Every 6 (Six) Hours As Needed for Severe Pain., Disp: 12 tablet, Rfl: 0    Insulin Syringe 29G X 1/2\" 0.5 ML misc, Use 1 each 4 (Four) Times a Day As Needed (diabetes). May substitute any equivalent insulin syringe, Disp: 200 each, Rfl: 1    Family History:  Family History   Problem Relation Age of Onset    No Known Problems Mother     No Known Problems Father     No Known Problems Sister     No Known Problems Brother     No Known Problems Maternal Aunt     No Known Problems Maternal Uncle     No Known Problems Paternal Aunt     No Known Problems Paternal Uncle     No Known Problems Maternal Grandmother     No Known Problems Maternal Grandfather     No Known Problems Paternal Grandmother     No Known Problems Paternal Grandfather     No Known Problems Other     Anemia Neg Hx     Arrhythmia Neg Hx     Asthma Neg Hx     Clotting disorder Neg Hx     Fainting Neg Hx     Heart attack Neg Hx     Heart disease Neg Hx     Heart failure Neg Hx     Hyperlipidemia Neg Hx     Hypertension Neg Hx        Past Medical History:  Past Medical History:   Diagnosis Date    DDD (degenerative disc disease), cervical     Diabetes mellitus     PCOS (polycystic ovarian syndrome)     Porphyria        Past surgical History:  Past Surgical " History:   Procedure Laterality Date    CERVICAL SPINE SURGERY      CYSTOSCOPY, URETEROSCOPY, RETROGRADE PYELOGRAM, STENT INSERTION Right 09/20/2023    Procedure: CYSTOSCOPY URETEROSCOPY STONE BASKET EXTRACTION HOLMIUM LASER STENT INSERTION;  Surgeon: Cory Phelps MD;  Location: [facility];  Service: Urology;  Laterality: Right;       Social History:  Social History     Socioeconomic History    Marital status: Single   Tobacco Use    Smoking status: Every Day     Types: Electronic Cigarette     Passive exposure: Never    Smokeless tobacco: Never   Vaping Use    Vaping status: Every Day    Substances: Nicotine    Passive vaping exposure: Yes   Substance and Sexual Activity    Alcohol use: Yes     Comment: occassionally    Drug use: Never    Sexual activity: Defer       Review of Systems:  The following systems were reviewed as they relate to the cardiovascular system: Constitutional, Eyes, ENT, Cardiovascular, Respiratory, Gastrointestinal, Integumentary, Neurological, Psychiatric, Hematologic, Endocrine, Musculoskeletal, and Genitourinary. The pertinent cardiovascular findings are reported above with all other cardiovascular points within those systems being negative.    Diagnostic Study Review:     Current Electrocardiogram:    ECG 12 Lead    Date/Time: 7/9/2024 12:16 PM  Performed by: Shawanda Lai MD    Authorized by: Shawanda Lai MD  Comparison: compared with previous ECG   Similar to previous ECG  Other findings: non-specific ST-T wave changes    Clinical impression: abnormal EKG  Comments: Ectopic atrial tachycardia with ventricular rate of 118 bpm and nonspecific ST-T changes                NOTE: The following portions of the patient's history were reviewed and updated this visit as appropriate: allergies, current medications, past family history, past medical history, past social history, past surgical history and problem list.

## 2024-07-12 ENCOUNTER — PATIENT ROUNDING (BHMG ONLY) (OUTPATIENT)
Dept: CARDIOLOGY | Facility: CLINIC | Age: 44
End: 2024-07-12
Payer: COMMERCIAL

## 2024-07-12 NOTE — PROGRESS NOTES
A My-Chart message has been sent to the patient for PATIENT ROUNDING with [facility].

## 2024-07-23 ENCOUNTER — APPOINTMENT (OUTPATIENT)
Dept: ULTRASOUND IMAGING | Facility: HOSPITAL | Age: 44
End: 2024-07-23
Payer: COMMERCIAL

## 2024-07-23 ENCOUNTER — APPOINTMENT (OUTPATIENT)
Dept: CT IMAGING | Facility: HOSPITAL | Age: 44
End: 2024-07-23
Payer: COMMERCIAL

## 2024-07-23 ENCOUNTER — HOSPITAL ENCOUNTER (OUTPATIENT)
Facility: HOSPITAL | Age: 44
Setting detail: OBSERVATION
Discharge: HOME | End: 2024-07-24
Attending: EMERGENCY MEDICINE | Admitting: FAMILY MEDICINE
Payer: COMMERCIAL

## 2024-07-23 DIAGNOSIS — R10.9 ABDOMINAL PAIN, UNSPECIFIED ABDOMINAL LOCATION: Primary | ICD-10-CM

## 2024-07-23 DIAGNOSIS — E87.6 HYPOKALEMIA: ICD-10-CM

## 2024-07-23 DIAGNOSIS — K80.20 GALLSTONES: ICD-10-CM

## 2024-07-23 DIAGNOSIS — E83.42 HYPOMAGNESEMIA: ICD-10-CM

## 2024-07-23 LAB
ACETONE BLD QL: NEGATIVE
ANION GAP SERPL CALCULATED.3IONS-SCNC: 17.1 MMOL/L (ref 5–15)
ATMOSPHERIC PRESS: ABNORMAL MM[HG]
B-HCG UR QL: NEGATIVE
BACTERIA UR QL AUTO: ABNORMAL /HPF
BASE EXCESS BLDV CALC-SCNC: -2.5 MMOL/L (ref -2–2)
BASOPHILS # BLD AUTO: 0.07 10*3/MM3 (ref 0–0.2)
BASOPHILS NFR BLD AUTO: 0.7 % (ref 0–1.5)
BDY SITE: ABNORMAL
BILIRUB UR QL STRIP: NEGATIVE
BUN SERPL-MCNC: 14 MG/DL (ref 6–20)
BUN/CREAT SERPL: 21.2 (ref 7–25)
CALCIUM SPEC-SCNC: 9.7 MG/DL (ref 8.6–10.5)
CHLORIDE SERPL-SCNC: 101 MMOL/L (ref 98–107)
CLARITY UR: CLEAR
CO2 BLDA-SCNC: 23.7 MMOL/L (ref 22–29)
CO2 SERPL-SCNC: 19.9 MMOL/L (ref 22–29)
COD CRY URNS QL: ABNORMAL /HPF
COLOR UR: YELLOW
CREAT SERPL-MCNC: 0.66 MG/DL (ref 0.57–1)
DEPRECATED RDW RBC AUTO: 45.3 FL (ref 37–54)
EGFRCR SERPLBLD CKD-EPI 2021: 109.7 ML/MIN/1.73
EOSINOPHIL # BLD AUTO: 0.12 10*3/MM3 (ref 0–0.4)
EOSINOPHIL NFR BLD AUTO: 1.2 % (ref 0.3–6.2)
ERYTHROCYTE [DISTWIDTH] IN BLOOD BY AUTOMATED COUNT: 15.1 % (ref 12.3–15.4)
GLUCOSE SERPL-MCNC: 141 MG/DL (ref 65–99)
GLUCOSE UR STRIP-MCNC: ABNORMAL MG/DL
HCO3 BLDV-SCNC: 22.5 MMOL/L (ref 22–26)
HCT VFR BLD AUTO: 42.5 % (ref 34–46.6)
HGB BLD-MCNC: 13.6 G/DL (ref 12–15.9)
HGB UR QL STRIP.AUTO: NEGATIVE
HYALINE CASTS UR QL AUTO: ABNORMAL /LPF
IMM GRANULOCYTES # BLD AUTO: 0.03 10*3/MM3 (ref 0–0.05)
IMM GRANULOCYTES NFR BLD AUTO: 0.3 % (ref 0–0.5)
INHALED O2 CONCENTRATION: 21 %
KETONES UR QL STRIP: ABNORMAL
LEUKOCYTE ESTERASE UR QL STRIP.AUTO: ABNORMAL
LYMPHOCYTES # BLD AUTO: 2.42 10*3/MM3 (ref 0.7–3.1)
LYMPHOCYTES NFR BLD AUTO: 24.2 % (ref 19.6–45.3)
MAGNESIUM SERPL-MCNC: 1.5 MG/DL (ref 1.6–2.6)
MCH RBC QN AUTO: 26.8 PG (ref 26.6–33)
MCHC RBC AUTO-ENTMCNC: 32 G/DL (ref 31.5–35.7)
MCV RBC AUTO: 83.7 FL (ref 79–97)
MODALITY: ABNORMAL
MONOCYTES # BLD AUTO: 0.84 10*3/MM3 (ref 0.1–0.9)
MONOCYTES NFR BLD AUTO: 8.4 % (ref 5–12)
MUCOUS THREADS URNS QL MICRO: ABNORMAL /HPF
NEUTROPHILS NFR BLD AUTO: 6.5 10*3/MM3 (ref 1.7–7)
NEUTROPHILS NFR BLD AUTO: 65.2 % (ref 42.7–76)
NITRITE UR QL STRIP: NEGATIVE
NRBC BLD AUTO-RTO: 0 /100 WBC (ref 0–0.2)
PCO2 BLDV: 38.8 MM HG (ref 42–51)
PH BLDV: 7.37 PH UNITS (ref 7.32–7.43)
PH UR STRIP.AUTO: 5.5 [PH] (ref 5–8)
PLATELET # BLD AUTO: 410 10*3/MM3 (ref 140–450)
PMV BLD AUTO: 9.6 FL (ref 6–12)
PO2 BLDV: 23 MM HG (ref 40–42)
POTASSIUM SERPL-SCNC: 3.1 MMOL/L (ref 3.5–5.2)
PROT UR QL STRIP: ABNORMAL
RBC # BLD AUTO: 5.08 10*6/MM3 (ref 3.77–5.28)
RBC # UR STRIP: ABNORMAL /HPF
REF LAB TEST METHOD: ABNORMAL
SAO2 % BLDCOV: 38.6 % (ref 45–75)
SODIUM SERPL-SCNC: 138 MMOL/L (ref 136–145)
SP GR UR STRIP: 1.02 (ref 1–1.03)
SQUAMOUS #/AREA URNS HPF: ABNORMAL /HPF
UROBILINOGEN UR QL STRIP: ABNORMAL
WBC # UR STRIP: ABNORMAL /HPF
WBC NRBC COR # BLD AUTO: 9.98 10*3/MM3 (ref 3.4–10.8)
YEAST URNS QL MICRO: ABNORMAL /HPF

## 2024-07-23 PROCEDURE — 80048 BASIC METABOLIC PNL TOTAL CA: CPT | Performed by: NURSE PRACTITIONER

## 2024-07-23 PROCEDURE — 85025 COMPLETE CBC W/AUTO DIFF WBC: CPT | Performed by: NURSE PRACTITIONER

## 2024-07-23 PROCEDURE — 76705 ECHO EXAM OF ABDOMEN: CPT

## 2024-07-23 PROCEDURE — 25010000002 MAGNESIUM SULFATE 2 GM/50ML SOLUTION: Performed by: EMERGENCY MEDICINE

## 2024-07-23 PROCEDURE — 87086 URINE CULTURE/COLONY COUNT: CPT | Performed by: NURSE PRACTITIONER

## 2024-07-23 PROCEDURE — 81001 URINALYSIS AUTO W/SCOPE: CPT | Performed by: NURSE PRACTITIONER

## 2024-07-23 PROCEDURE — 82009 KETONE BODYS QUAL: CPT | Performed by: NURSE PRACTITIONER

## 2024-07-23 PROCEDURE — 74176 CT ABD & PELVIS W/O CONTRAST: CPT

## 2024-07-23 PROCEDURE — 96375 TX/PRO/DX INJ NEW DRUG ADDON: CPT

## 2024-07-23 PROCEDURE — 93005 ELECTROCARDIOGRAM TRACING: CPT | Performed by: NURSE PRACTITIONER

## 2024-07-23 PROCEDURE — 96365 THER/PROPH/DIAG IV INF INIT: CPT

## 2024-07-23 PROCEDURE — 96366 THER/PROPH/DIAG IV INF ADDON: CPT

## 2024-07-23 PROCEDURE — 81025 URINE PREGNANCY TEST: CPT | Performed by: NURSE PRACTITIONER

## 2024-07-23 PROCEDURE — 99285 EMERGENCY DEPT VISIT HI MDM: CPT

## 2024-07-23 PROCEDURE — 82803 BLOOD GASES ANY COMBINATION: CPT | Performed by: NURSE PRACTITIONER

## 2024-07-23 PROCEDURE — 25010000002 KETOROLAC TROMETHAMINE PER 15 MG: Performed by: NURSE PRACTITIONER

## 2024-07-23 PROCEDURE — 83735 ASSAY OF MAGNESIUM: CPT | Performed by: NURSE PRACTITIONER

## 2024-07-23 RX ORDER — SODIUM CHLORIDE 0.9 % (FLUSH) 0.9 %
10 SYRINGE (ML) INJECTION EVERY 12 HOURS SCHEDULED
Status: DISCONTINUED | OUTPATIENT
Start: 2024-07-24 | End: 2024-07-24 | Stop reason: HOSPADM

## 2024-07-23 RX ORDER — BISACODYL 10 MG
10 SUPPOSITORY, RECTAL RECTAL DAILY PRN
Status: DISCONTINUED | OUTPATIENT
Start: 2024-07-23 | End: 2024-07-24 | Stop reason: HOSPADM

## 2024-07-23 RX ORDER — AMOXICILLIN 250 MG
2 CAPSULE ORAL 2 TIMES DAILY PRN
Status: DISCONTINUED | OUTPATIENT
Start: 2024-07-23 | End: 2024-07-24 | Stop reason: HOSPADM

## 2024-07-23 RX ORDER — KETOROLAC TROMETHAMINE 30 MG/ML
15 INJECTION, SOLUTION INTRAMUSCULAR; INTRAVENOUS ONCE
Status: COMPLETED | OUTPATIENT
Start: 2024-07-23 | End: 2024-07-23

## 2024-07-23 RX ORDER — ONDANSETRON 2 MG/ML
4 INJECTION INTRAMUSCULAR; INTRAVENOUS EVERY 6 HOURS PRN
Status: DISCONTINUED | OUTPATIENT
Start: 2024-07-23 | End: 2024-07-24 | Stop reason: HOSPADM

## 2024-07-23 RX ORDER — SODIUM CHLORIDE 0.9 % (FLUSH) 0.9 %
10 SYRINGE (ML) INJECTION AS NEEDED
Status: DISCONTINUED | OUTPATIENT
Start: 2024-07-23 | End: 2024-07-24 | Stop reason: HOSPADM

## 2024-07-23 RX ORDER — SODIUM CHLORIDE 9 MG/ML
40 INJECTION, SOLUTION INTRAVENOUS AS NEEDED
Status: DISCONTINUED | OUTPATIENT
Start: 2024-07-23 | End: 2024-07-24 | Stop reason: HOSPADM

## 2024-07-23 RX ORDER — POLYETHYLENE GLYCOL 3350 17 G/17G
17 POWDER, FOR SOLUTION ORAL DAILY PRN
Status: DISCONTINUED | OUTPATIENT
Start: 2024-07-23 | End: 2024-07-24 | Stop reason: HOSPADM

## 2024-07-23 RX ORDER — MAGNESIUM SULFATE HEPTAHYDRATE 40 MG/ML
2 INJECTION, SOLUTION INTRAVENOUS
Status: COMPLETED | OUTPATIENT
Start: 2024-07-23 | End: 2024-07-24

## 2024-07-23 RX ORDER — BISACODYL 5 MG/1
5 TABLET, DELAYED RELEASE ORAL DAILY PRN
Status: DISCONTINUED | OUTPATIENT
Start: 2024-07-23 | End: 2024-07-24 | Stop reason: HOSPADM

## 2024-07-23 RX ORDER — NITROGLYCERIN 0.4 MG/1
0.4 TABLET SUBLINGUAL
Status: DISCONTINUED | OUTPATIENT
Start: 2024-07-23 | End: 2024-07-24 | Stop reason: HOSPADM

## 2024-07-23 RX ORDER — FAMOTIDINE 20 MG/1
40 TABLET, FILM COATED ORAL DAILY
Status: DISCONTINUED | OUTPATIENT
Start: 2024-07-24 | End: 2024-07-24 | Stop reason: HOSPADM

## 2024-07-23 RX ORDER — POTASSIUM CHLORIDE 20 MEQ/1
40 TABLET, EXTENDED RELEASE ORAL EVERY 4 HOURS
Status: DISPENSED | OUTPATIENT
Start: 2024-07-23 | End: 2024-07-24

## 2024-07-23 RX ADMIN — MAGNESIUM SULFATE HEPTAHYDRATE 2 G: 40 INJECTION, SOLUTION INTRAVENOUS at 23:27

## 2024-07-23 RX ADMIN — MAGNESIUM SULFATE HEPTAHYDRATE 2 G: 40 INJECTION, SOLUTION INTRAVENOUS at 20:40

## 2024-07-23 RX ADMIN — KETOROLAC TROMETHAMINE 15 MG: 30 INJECTION, SOLUTION INTRAMUSCULAR at 20:23

## 2024-07-23 RX ADMIN — POTASSIUM CHLORIDE 40 MEQ: 1500 TABLET, EXTENDED RELEASE ORAL at 20:40

## 2024-07-23 NOTE — ED PROVIDER NOTES
"Subjective   Provider in Triage Note  Patient states she was sent in by her urologist that she has had some elevated blood sugar readings subjective fever flank pain lower abdominal pain, recently had lithotripsy on July 12.  No nausea vomiting diarrhea.  States she has felt foggy.    Due to significant overcrowding in the emergency department patient was initially seen and evaluated in triage.  Provider in triage recommended patient placement in the treatment area to initiate therapy and movement to an ER bed as soon as possible.   Orders placed; medications will be deferred to main provider per protocol.        History of Present Illness  With the patient agree with the above.  She does report that she had lithotripsy on the 12th, she had a follow-up in the urology office on Friday, had CT at that time at the office.  Noted she reports she had worsening pain symptoms, subjective fever.  She reports that she has episodes of feeling \"foggy\".   Patient denies visual disturbances, speech disturbances, facial droop, unilateral weakness, numbness or tingling.  Denies difficulty walking or lethargy.  Reports associated nausea vomiting.  She reports that she was on Macrobid after surgery, but has not since been on any further antibiotics  Review of Systems  Per HPI  Past Medical History:   Diagnosis Date    DDD (degenerative disc disease), cervical     Diabetes mellitus     PCOS (polycystic ovarian syndrome)     Porphyria        Allergies   Allergen Reactions    Adhesive Tape Other (See Comments)    Cephalosporins Swelling       Past Surgical History:   Procedure Laterality Date    CERVICAL SPINE SURGERY      CYSTOSCOPY, URETEROSCOPY, RETROGRADE PYELOGRAM, STENT INSERTION Right 09/20/2023    Procedure: CYSTOSCOPY URETEROSCOPY STONE BASKET EXTRACTION HOLMIUM LASER STENT INSERTION;  Surgeon: Cory Phelps MD;  Location: [facility] MAIN OR;  Service: Urology;  Laterality: Right;       Family History   Problem Relation Age of " Onset    No Known Problems Mother     No Known Problems Father     No Known Problems Sister     No Known Problems Brother     No Known Problems Maternal Aunt     No Known Problems Maternal Uncle     No Known Problems Paternal Aunt     No Known Problems Paternal Uncle     No Known Problems Maternal Grandmother     No Known Problems Maternal Grandfather     No Known Problems Paternal Grandmother     No Known Problems Paternal Grandfather     No Known Problems Other     Anemia Neg Hx     Arrhythmia Neg Hx     Asthma Neg Hx     Clotting disorder Neg Hx     Fainting Neg Hx     Heart attack Neg Hx     Heart disease Neg Hx     Heart failure Neg Hx     Hyperlipidemia Neg Hx     Hypertension Neg Hx        Social History     Socioeconomic History    Marital status: Single   Tobacco Use    Smoking status: Every Day     Types: Electronic Cigarette     Passive exposure: Never    Smokeless tobacco: Never   Vaping Use    Vaping status: Every Day    Substances: Nicotine    Passive vaping exposure: Yes   Substance and Sexual Activity    Alcohol use: Yes     Comment: occassionally    Drug use: Never    Sexual activity: Defer           Objective   Physical Exam  Vitals and nursing note reviewed.   Constitutional:       Appearance: She is well-developed. She is not toxic-appearing.   HENT:      Head: Normocephalic and atraumatic.      Mouth/Throat:      Mouth: Mucous membranes are moist.      Pharynx: Oropharynx is clear.   Eyes:      Extraocular Movements: Extraocular movements intact.      Pupils: Pupils are equal, round, and reactive to light.   Cardiovascular:      Rate and Rhythm: Normal rate and regular rhythm.      Heart sounds: Normal heart sounds. No murmur heard.     No friction rub. No gallop.   Pulmonary:      Effort: Pulmonary effort is normal.      Breath sounds: Normal breath sounds.   Abdominal:      General: Bowel sounds are normal.      Palpations: Abdomen is soft.      Tenderness: There is no abdominal tenderness.    Skin:     General: Skin is warm and dry.      Capillary Refill: Capillary refill takes less than 2 seconds.   Neurological:      General: No focal deficit present.      Mental Status: She is alert and oriented to person, place, and time.         Procedures           ED Course  ED Course as of 07/23/24 2359 Tue Jul 23, 2024 2156 Patient updated [LB]      ED Course User Index  [LB] Mercedes Carrillo APRN      Vitals:    07/23/24 2330   BP: 104/74   Pulse: 100   Resp: 18   Temp:    SpO2: 99%     Medications   Potassium Replacement - Follow Nurse / BPA Driven Protocol (has no administration in time range)   Magnesium Standard Dose Replacement - Follow Nurse / BPA Driven Protocol (has no administration in time range)   Phosphorus Replacement - Follow Nurse / BPA Driven Protocol (has no administration in time range)   Calcium Replacement - Follow Nurse / BPA Driven Protocol (has no administration in time range)   magnesium sulfate 2g/50 mL (PREMIX) infusion (2 g Intravenous New Bag 7/23/24 2327)   potassium chloride (KLOR-CON M20) CR tablet 40 mEq (40 mEq Oral Given 7/23/24 2040)   sodium chloride 0.9 % flush 10 mL (has no administration in time range)   sodium chloride 0.9 % flush 10 mL (has no administration in time range)   sodium chloride 0.9 % infusion 40 mL (has no administration in time range)   nitroglycerin (NITROSTAT) SL tablet 0.4 mg (has no administration in time range)   sennosides-docusate (PERICOLACE) 8.6-50 MG per tablet 2 tablet (has no administration in time range)     And   polyethylene glycol (MIRALAX) packet 17 g (has no administration in time range)     And   bisacodyl (DULCOLAX) EC tablet 5 mg (has no administration in time range)     And   bisacodyl (DULCOLAX) suppository 10 mg (has no administration in time range)   ondansetron (ZOFRAN) injection 4 mg (has no administration in time range)   famotidine (PEPCID) tablet 40 mg (has no administration in time range)   ketorolac (TORADOL)  injection 15 mg (15 mg Intravenous Given 7/23/24 2023)     Lab Results (last 24 hours)       Procedure Component Value Units Date/Time    CBC & Differential [[id number]]  (Normal) Collected: 07/23/24 1745    Specimen: Blood from Arm, Right Updated: 07/23/24 1751    Narrative:      The following orders were created for panel order CBC & Differential.  Procedure                               Abnormality         Status                     ---------                               -----------         ------                     CBC Auto Differential[[id number]]        Normal              Final result                 Please view results for these tests on the individual orders.    Basic Metabolic Panel [[id number]]  (Abnormal) Collected: 07/23/24 1745    Specimen: Blood from Arm, Right Updated: 07/23/24 1839     Glucose 141 mg/dL      BUN 14 mg/dL      Creatinine 0.66 mg/dL      Sodium 138 mmol/L      Potassium 3.1 mmol/L      Chloride 101 mmol/L      CO2 19.9 mmol/L      Calcium 9.7 mg/dL      BUN/Creatinine Ratio 21.2     Anion Gap 17.1 mmol/L      eGFR 109.7 mL/min/1.73     Narrative:      GFR Normal >60  Chronic Kidney Disease <60  Kidney Failure <15      Magnesium [[id number]]  (Abnormal) Collected: 07/23/24 1745    Specimen: Blood from Arm, Right Updated: 07/23/24 1839     Magnesium 1.5 mg/dL     CBC Auto Differential [[id number]]  (Normal) Collected: 07/23/24 1745    Specimen: Blood from Arm, Right Updated: 07/23/24 1751     WBC 9.98 10*3/mm3      RBC 5.08 10*6/mm3      Hemoglobin 13.6 g/dL      Hematocrit 42.5 %      MCV 83.7 fL      MCH 26.8 pg      MCHC 32.0 g/dL      RDW 15.1 %      RDW-SD 45.3 fl      MPV 9.6 fL      Platelets 410 10*3/mm3      Neutrophil % 65.2 %      Lymphocyte % 24.2 %      Monocyte % 8.4 %      Eosinophil % 1.2 %      Basophil % 0.7 %      Immature Grans % 0.3 %      Neutrophils, Absolute 6.50 10*3/mm3      Lymphocytes, Absolute 2.42 10*3/mm3      Monocytes, Absolute 0.84 10*3/mm3       Eosinophils, Absolute 0.12 10*3/mm3      Basophils, Absolute 0.07 10*3/mm3      Immature Grans, Absolute 0.03 10*3/mm3      nRBC 0.0 /100 WBC     Acetone [[id number]]  (Normal) Collected: 07/23/24 1745    Specimen: Blood from Arm, Right Updated: 07/23/24 1955     Acetone Negative    Pregnancy, Urine - Urine, Clean Catch [[id number]]  (Normal) Collected: 07/23/24 1755    Specimen: Urine, Clean Catch Updated: 07/23/24 1807     HCG, Urine QL Negative    Urinalysis With Culture If Indicated - Urine, Clean Catch [[id number]]  (Abnormal) Collected: 07/23/24 1755    Specimen: Urine, Clean Catch Updated: 07/23/24 1808     Color, UA Yellow     Appearance, UA Clear     pH, UA 5.5     Specific Gravity, UA 1.023     Glucose,  mg/dL (Trace)     Ketones, UA Trace     Bilirubin, UA Negative     Blood, UA Negative     Protein, UA 30 mg/dL (1+)     Leuk Esterase, UA Small (1+)     Nitrite, UA Negative     Urobilinogen, UA 1.0 E.U./dL    Narrative:      In absence of clinical symptoms, the presence of pyuria, bacteria, and/or nitrites on the urinalysis result does not correlate with infection.    Urinalysis, Microscopic Only - Urine, Clean Catch [[id number]]  (Abnormal) Collected: 07/23/24 1755    Specimen: Urine, Clean Catch Updated: 07/23/24 1905     RBC, UA 0-2 /HPF      WBC, UA 6-10 /HPF      Bacteria, UA Trace /HPF      Squamous Epithelial Cells, UA 3-6 /HPF      Yeast, UA Small/1+ Budding Yeast /HPF      Hyaline Casts, UA None Seen /LPF      Calcium Oxalate Crystals, UA Small/1+ /HPF      Mucus, UA Small/1+ /HPF      Methodology Manual Light Microscopy    Urine Culture - Urine, Urine, Clean Catch [[id number]] Collected: 07/23/24 1755    Specimen: Urine, Clean Catch Updated: 07/23/24 1905    Blood Gas, Venous - [[id number]]  (Abnormal) Collected: 07/23/24 2032    Specimen: Venous Blood Updated: 07/23/24 2115     Site Left Brachial     pH, Venous 7.370 pH Units      pCO2, Venous 38.8 mm Hg      pO2, Venous 23.0 mm Hg      HCO3,  Venous 22.5 mmol/L      Base Excess, Venous -2.5 mmol/L      Comment: Serial Number: [id number]:  [id number]        O2 Saturation, Venous 38.6 %      CO2 Content 23.7 mmol/L      Barometric Pressure for Blood Gas --     Comment: N/A        Modality Room Air     FIO2 21 %                                                  Medical Decision Making  Amount and/or Complexity of Data Reviewed  Labs: ordered.  Radiology: ordered.  ECG/medicine tests: ordered.    Risk  OTC drugs.  Prescription drug management.    Chart Review: Endocrinology note 7/2/2024.  Seen for diabetes type 2.  On pioglitazone, 1000 mg of metformin, 2.5 Mounjaro.  Imaging: US Abdomen Limited    Result Date: 7/23/2024  Impression: 1. Cholelithiasis with distended gallbladder. No abnormal gallbladder wall thickening or pericholecystic fluid. Negative sonographic Peres sign. The above findings suggest against acute cholecystitis. 2. Normal caliber common bile duct measuring 5 mm. Electronically Signed: Juan Escobar  7/23/2024 11:11 PM EDT  Workstation ID: [id number]    CT Abdomen Pelvis Stone Protocol    Result Date: 7/23/2024  1. Cholelithiasis. Gallbladder is increased in distention compared to prior study. No significant inflammatory change noted. Ultrasound would be more sensitive to evaluate if clinically indicated. 2. Nonobstructing left renal calculi. No evidence of obstructive uropathy on current exam. 3. No definite acute intra-abdominal or intrapelvic abnormality otherwise noted Electronically Signed: Estuardo Ramírez MD  7/23/2024 8:35 PM EDT  Workstation ID: [id number]     EKG interpreted independently per ED attending physican-sinus tachycardia rate 111.  Nonspecific T abnormalities, similar to previous.  Compared to 7/5/2020  Pt was Placed on appropriate monitoring.  Differential diagnoses considered for patient presentation, this list is not all inclusive of diagnoses considered: Ureterolithiasis, pyelonephritis, UTI, electrolyte  imbalance  Patient presents to the ED for the above complaint, underwent the above, exam and workup.  Patient noted to have potassium of 3.1, magnesium 1.5, acetone was negative.  Not acidotic on VBG, no signs of infection in the urine.  Normal white blood cell count.  CT abdomen pelvis reviewed above, gallstones noted, ultrasound obtained, findings less likely to be cholecystitis.  Patient is a negative Peres sign on exam.  Will be admitted for electrolyte replacement    Disposition: I discussed with the patient their test results, work-up here in the emergency department, and need for admission and further evaluation. Patient is agreeable to the plan of care. At time of disposition patient's VS are reviewed, and patient without acute distress.  Opportunity was provided for questions at the bedside, all questions and concerns were addressed.  Note Disclaimer: At [facility], we believe that sharing information builds trust and better relationships. You are receiving this note because you recently visited [facility]. It is possible you will see health information before a provider has talked with you about it. This kind of information can be easy to misunderstand. To help you fully understand what it means for your health, we urge you to discuss this note with your provider  Note dictated utilizing Dragon Dictation.  Appropriate PPE worn during patient interactions.        Final diagnoses:   Abdominal pain, unspecified abdominal location   Gallstones   Hypokalemia   Hypomagnesemia       ED Disposition  ED Disposition       ED Disposition   Decision to Admit    Condition   --    Comment   Level of Care: Telemetry [5]   Admitting Physician: TERESA RICHARDSON [[id number]]   Attending Physician: TERESA RICHARDSON [[id number]]                 No follow-up provider specified.       Medication List      No changes were made to your prescriptions during this visit.            Mercedes Carrillo, APRN  07/23/24 6563

## 2024-07-24 ENCOUNTER — READMISSION MANAGEMENT (OUTPATIENT)
Dept: CALL CENTER | Facility: HOSPITAL | Age: 44
End: 2024-07-24
Payer: COMMERCIAL

## 2024-07-24 VITALS
WEIGHT: 175 LBS | TEMPERATURE: 97.8 F | DIASTOLIC BLOOD PRESSURE: 64 MMHG | HEIGHT: 70 IN | HEART RATE: 93 BPM | OXYGEN SATURATION: 99 % | RESPIRATION RATE: 17 BRPM | BODY MASS INDEX: 25.05 KG/M2 | SYSTOLIC BLOOD PRESSURE: 114 MMHG

## 2024-07-24 PROBLEM — K80.20 GALLSTONES: Status: ACTIVE | Noted: 2024-07-24

## 2024-07-24 PROBLEM — R10.9 ABDOMINAL PAIN: Status: ACTIVE | Noted: 2024-07-24

## 2024-07-24 LAB
ANION GAP SERPL CALCULATED.3IONS-SCNC: 13 MMOL/L (ref 5–15)
BASOPHILS # BLD AUTO: 0.08 10*3/MM3 (ref 0–0.2)
BASOPHILS NFR BLD AUTO: 0.8 % (ref 0–1.5)
BUN SERPL-MCNC: 15 MG/DL (ref 6–20)
BUN/CREAT SERPL: 24.2 (ref 7–25)
CALCIUM SPEC-SCNC: 9.2 MG/DL (ref 8.6–10.5)
CHLORIDE SERPL-SCNC: 104 MMOL/L (ref 98–107)
CO2 SERPL-SCNC: 19 MMOL/L (ref 22–29)
CREAT SERPL-MCNC: 0.62 MG/DL (ref 0.57–1)
DEPRECATED RDW RBC AUTO: 45.6 FL (ref 37–54)
EGFRCR SERPLBLD CKD-EPI 2021: 111.4 ML/MIN/1.73
EOSINOPHIL # BLD AUTO: 0.23 10*3/MM3 (ref 0–0.4)
EOSINOPHIL NFR BLD AUTO: 2.3 % (ref 0.3–6.2)
ERYTHROCYTE [DISTWIDTH] IN BLOOD BY AUTOMATED COUNT: 15.2 % (ref 12.3–15.4)
GLUCOSE SERPL-MCNC: 129 MG/DL (ref 65–99)
HCT VFR BLD AUTO: 40.6 % (ref 34–46.6)
HGB BLD-MCNC: 12.6 G/DL (ref 12–15.9)
IMM GRANULOCYTES # BLD AUTO: 0.06 10*3/MM3 (ref 0–0.05)
IMM GRANULOCYTES NFR BLD AUTO: 0.6 % (ref 0–0.5)
LYMPHOCYTES # BLD AUTO: 3.2 10*3/MM3 (ref 0.7–3.1)
LYMPHOCYTES NFR BLD AUTO: 31.5 % (ref 19.6–45.3)
MAGNESIUM SERPL-MCNC: 3.1 MG/DL (ref 1.6–2.6)
MCH RBC QN AUTO: 26.1 PG (ref 26.6–33)
MCHC RBC AUTO-ENTMCNC: 31 G/DL (ref 31.5–35.7)
MCV RBC AUTO: 84.2 FL (ref 79–97)
MONOCYTES # BLD AUTO: 0.88 10*3/MM3 (ref 0.1–0.9)
MONOCYTES NFR BLD AUTO: 8.7 % (ref 5–12)
NEUTROPHILS NFR BLD AUTO: 5.71 10*3/MM3 (ref 1.7–7)
NEUTROPHILS NFR BLD AUTO: 56.1 % (ref 42.7–76)
NRBC BLD AUTO-RTO: 0 /100 WBC (ref 0–0.2)
PLATELET # BLD AUTO: 338 10*3/MM3 (ref 140–450)
PMV BLD AUTO: 9.4 FL (ref 6–12)
POTASSIUM SERPL-SCNC: 3.4 MMOL/L (ref 3.5–5.2)
QT INTERVAL: 331 MS
QTC INTERVAL: 450 MS
RBC # BLD AUTO: 4.82 10*6/MM3 (ref 3.77–5.28)
SODIUM SERPL-SCNC: 136 MMOL/L (ref 136–145)
WBC NRBC COR # BLD AUTO: 10.16 10*3/MM3 (ref 3.4–10.8)

## 2024-07-24 PROCEDURE — 25810000003 SODIUM CHLORIDE 0.9 % SOLUTION

## 2024-07-24 PROCEDURE — 25010000002 MAGNESIUM SULFATE 2 GM/50ML SOLUTION: Performed by: EMERGENCY MEDICINE

## 2024-07-24 PROCEDURE — 25810000003 SODIUM CHLORIDE 0.9 % SOLUTION 1,000 ML FLEX CONT

## 2024-07-24 PROCEDURE — G0378 HOSPITAL OBSERVATION PER HR: HCPCS

## 2024-07-24 PROCEDURE — 96366 THER/PROPH/DIAG IV INF ADDON: CPT

## 2024-07-24 PROCEDURE — 83735 ASSAY OF MAGNESIUM: CPT | Performed by: EMERGENCY MEDICINE

## 2024-07-24 PROCEDURE — 80048 BASIC METABOLIC PNL TOTAL CA: CPT

## 2024-07-24 PROCEDURE — 85025 COMPLETE CBC W/AUTO DIFF WBC: CPT

## 2024-07-24 RX ORDER — SODIUM CHLORIDE 9 MG/ML
100 INJECTION, SOLUTION INTRAVENOUS CONTINUOUS
Status: DISCONTINUED | OUTPATIENT
Start: 2024-07-24 | End: 2024-07-24 | Stop reason: HOSPADM

## 2024-07-24 RX ORDER — CETIRIZINE HYDROCHLORIDE 10 MG/1
10 TABLET ORAL DAILY
COMMUNITY

## 2024-07-24 RX ORDER — FLUCONAZOLE 150 MG/1
150 TABLET ORAL ONCE
Status: COMPLETED | OUTPATIENT
Start: 2024-07-24 | End: 2024-07-24

## 2024-07-24 RX ORDER — KETOROLAC TROMETHAMINE 30 MG/ML
15 INJECTION, SOLUTION INTRAMUSCULAR; INTRAVENOUS EVERY 6 HOURS PRN
Status: DISCONTINUED | OUTPATIENT
Start: 2024-07-24 | End: 2024-07-24 | Stop reason: HOSPADM

## 2024-07-24 RX ADMIN — Medication 10 ML: at 07:49

## 2024-07-24 RX ADMIN — MAGNESIUM SULFATE HEPTAHYDRATE 2 G: 40 INJECTION, SOLUTION INTRAVENOUS at 02:16

## 2024-07-24 RX ADMIN — FAMOTIDINE 40 MG: 20 TABLET, FILM COATED ORAL at 07:49

## 2024-07-24 RX ADMIN — Medication 10 ML: at 02:19

## 2024-07-24 RX ADMIN — POTASSIUM CHLORIDE 40 MEQ: 1500 TABLET, EXTENDED RELEASE ORAL at 02:16

## 2024-07-24 RX ADMIN — FLUCONAZOLE 150 MG: 150 TABLET ORAL at 10:04

## 2024-07-24 RX ADMIN — SODIUM CHLORIDE 40 ML: 9 INJECTION, SOLUTION INTRAVENOUS at 02:17

## 2024-07-24 RX ADMIN — SODIUM CHLORIDE 100 ML/HR: 9 INJECTION, SOLUTION INTRAVENOUS at 10:03

## 2024-07-24 NOTE — PLAN OF CARE
PIV removed, AVS reviewed. Patient tolerated diet and is to drive self home. Patient instructed to follow up with PCP in 1-2 weeks.

## 2024-07-24 NOTE — DISCHARGE SUMMARY
Date of Discharge:  7/24/2024    Discharge Diagnosis:     Abdominal pain  Gallstone  Hypokalemia  Hypomagnesemia  Kidney stone without obstruction  Diabetes mellitus    Presenting Problem/History of Present Illness  Active Hospital Problems    Diagnosis  POA    **Abdominal pain [R10.9]  Yes    Gallstones [K80.20]  Yes    Hypomagnesemia [E83.42]  Yes    Hypokalemia [E87.6]  Yes      Resolved Hospital Problems   No resolved problems to display.          Hospital Course    Nora Tam is a 44-year-old female who presented to [facility] ED on 7/23/2024 and will discharge on 7/24/2024.  Patient presented to ER with progressive abdominal pain.  She does report she had a recent lithotripsy on 7/12/2024.  Patient reported subjective fevers and chills.  She does have history of acute kidney injury and was concern for kidneys.  While in ED patient did have CT of abdomen that showed gallstones without cholecystolithiasis as well as left kidney stone without obstruction.  She has remained hemodynamically stable during her overnight stay.  She did have electrolyte imbalance that was treated with replacement.  Patient is eager to go home.  She is a teacher and school starts tomorrow.  She reports she is feeling much better and got good sleep last night.  She will follow-up with her PCP in 1 to 2 weeks to repeat blood work and ensure electrolytes have maintained.  Patient's abdominal pain has significantly improved as well.  Above plan has been discussed.  She agrees.  She is hemodynamically stable at time of discharge    Procedures Performed         Consults:   Consults       No orders found for last 30 day(s).            Pertinent Test Results:    Lab Results (most recent)       Procedure Component Value Units Date/Time    Magnesium [[id number]]  (Abnormal) Collected: 07/24/24 0443    Specimen: Blood Updated: 07/24/24 0631     Magnesium 3.1 mg/dL     Basic Metabolic Panel [[id number]]  (Abnormal) Collected:  07/24/24 0443    Specimen: Blood Updated: 07/24/24 0629     Glucose 129 mg/dL      BUN 15 mg/dL      Creatinine 0.62 mg/dL      Sodium 136 mmol/L      Potassium 3.4 mmol/L      Chloride 104 mmol/L      CO2 19.0 mmol/L      Calcium 9.2 mg/dL      BUN/Creatinine Ratio 24.2     Anion Gap 13.0 mmol/L      eGFR 111.4 mL/min/1.73     Narrative:      GFR Normal >60  Chronic Kidney Disease <60  Kidney Failure <15      CBC & Differential [[id number]]  (Abnormal) Collected: 07/24/24 0443    Specimen: Blood Updated: 07/24/24 0451    Narrative:      The following orders were created for panel order CBC & Differential.  Procedure                               Abnormality         Status                     ---------                               -----------         ------                     CBC Auto Differential[[id number]]        Abnormal            Final result                 Please view results for these tests on the individual orders.    CBC Auto Differential [[id number]]  (Abnormal) Collected: 07/24/24 0443    Specimen: Blood Updated: 07/24/24 0451     WBC 10.16 10*3/mm3      RBC 4.82 10*6/mm3      Hemoglobin 12.6 g/dL      Hematocrit 40.6 %      MCV 84.2 fL      MCH 26.1 pg      MCHC 31.0 g/dL      RDW 15.2 %      RDW-SD 45.6 fl      MPV 9.4 fL      Platelets 338 10*3/mm3      Neutrophil % 56.1 %      Lymphocyte % 31.5 %      Monocyte % 8.7 %      Eosinophil % 2.3 %      Basophil % 0.8 %      Immature Grans % 0.6 %      Neutrophils, Absolute 5.71 10*3/mm3      Lymphocytes, Absolute 3.20 10*3/mm3      Monocytes, Absolute 0.88 10*3/mm3      Eosinophils, Absolute 0.23 10*3/mm3      Basophils, Absolute 0.08 10*3/mm3      Immature Grans, Absolute 0.06 10*3/mm3      nRBC 0.0 /100 WBC     Blood Gas, Venous - [[id number]]  (Abnormal) Collected: 07/23/24 2032    Specimen: Venous Blood Updated: 07/23/24 2115     Site Left Brachial     pH, Venous 7.370 pH Units      pCO2, Venous 38.8 mm Hg      pO2, Venous 23.0 mm Hg      HCO3, Venous  22.5 mmol/L      Base Excess, Venous -2.5 mmol/L      Comment: Serial Number: [id number]:  [id number]        O2 Saturation, Venous 38.6 %      CO2 Content 23.7 mmol/L      Barometric Pressure for Blood Gas --     Comment: N/A        Modality Room Air     FIO2 21 %     Acetone [[id number]]  (Normal) Collected: 07/23/24 1745    Specimen: Blood from Arm, Right Updated: 07/23/24 1955     Acetone Negative    Urinalysis, Microscopic Only - Urine, Clean Catch [[id number]]  (Abnormal) Collected: 07/23/24 1755    Specimen: Urine, Clean Catch Updated: 07/23/24 1905     RBC, UA 0-2 /HPF      WBC, UA 6-10 /HPF      Bacteria, UA Trace /HPF      Squamous Epithelial Cells, UA 3-6 /HPF      Yeast, UA Small/1+ Budding Yeast /HPF      Hyaline Casts, UA None Seen /LPF      Calcium Oxalate Crystals, UA Small/1+ /HPF      Mucus, UA Small/1+ /HPF      Methodology Manual Light Microscopy    Urine Culture - Urine, Urine, Clean Catch [[id number]] Collected: 07/23/24 1755    Specimen: Urine, Clean Catch Updated: 07/23/24 1905    Basic Metabolic Panel [[id number]]  (Abnormal) Collected: 07/23/24 1745    Specimen: Blood from Arm, Right Updated: 07/23/24 1839     Glucose 141 mg/dL      BUN 14 mg/dL      Creatinine 0.66 mg/dL      Sodium 138 mmol/L      Potassium 3.1 mmol/L      Chloride 101 mmol/L      CO2 19.9 mmol/L      Calcium 9.7 mg/dL      BUN/Creatinine Ratio 21.2     Anion Gap 17.1 mmol/L      eGFR 109.7 mL/min/1.73     Narrative:      GFR Normal >60  Chronic Kidney Disease <60  Kidney Failure <15      Magnesium [[id number]]  (Abnormal) Collected: 07/23/24 1745    Specimen: Blood from Arm, Right Updated: 07/23/24 1839     Magnesium 1.5 mg/dL     Urinalysis With Culture If Indicated - Urine, Clean Catch [[id number]]  (Abnormal) Collected: 07/23/24 1755    Specimen: Urine, Clean Catch Updated: 07/23/24 1808     Color, UA Yellow     Appearance, UA Clear     pH, UA 5.5     Specific Gravity, UA 1.023     Glucose,  mg/dL (Trace)     Ketones,  UA Trace     Bilirubin, UA Negative     Blood, UA Negative     Protein, UA 30 mg/dL (1+)     Leuk Esterase, UA Small (1+)     Nitrite, UA Negative     Urobilinogen, UA 1.0 E.U./dL    Narrative:      In absence of clinical symptoms, the presence of pyuria, bacteria, and/or nitrites on the urinalysis result does not correlate with infection.    Pregnancy, Urine - Urine, Clean Catch [[id number]]  (Normal) Collected: 07/23/24 1755    Specimen: Urine, Clean Catch Updated: 07/23/24 1807     HCG, Urine QL Negative    CBC & Differential [[id number]]  (Normal) Collected: 07/23/24 1745    Specimen: Blood from Arm, Right Updated: 07/23/24 1751    Narrative:      The following orders were created for panel order CBC & Differential.  Procedure                               Abnormality         Status                     ---------                               -----------         ------                     CBC Auto Differential[[id number]]        Normal              Final result                 Please view results for these tests on the individual orders.    CBC Auto Differential [[id number]]  (Normal) Collected: 07/23/24 1745    Specimen: Blood from Arm, Right Updated: 07/23/24 1751     WBC 9.98 10*3/mm3      RBC 5.08 10*6/mm3      Hemoglobin 13.6 g/dL      Hematocrit 42.5 %      MCV 83.7 fL      MCH 26.8 pg      MCHC 32.0 g/dL      RDW 15.1 %      RDW-SD 45.3 fl      MPV 9.6 fL      Platelets 410 10*3/mm3      Neutrophil % 65.2 %      Lymphocyte % 24.2 %      Monocyte % 8.4 %      Eosinophil % 1.2 %      Basophil % 0.7 %      Immature Grans % 0.3 %      Neutrophils, Absolute 6.50 10*3/mm3      Lymphocytes, Absolute 2.42 10*3/mm3      Monocytes, Absolute 0.84 10*3/mm3      Eosinophils, Absolute 0.12 10*3/mm3      Basophils, Absolute 0.07 10*3/mm3      Immature Grans, Absolute 0.03 10*3/mm3      nRBC 0.0 /100 WBC                      Condition on Discharge:  stable    Vital Signs  Temp:  [97.5 °F (36.4 °C)-98.4 °F (36.9 °C)] 97.8 °F  "(36.6 °C)  Heart Rate:  [] 93  Resp:  [16-18] 17  BP: ()/(57-82) 114/64      Physical Exam  Vitals reviewed.   HENT:      Head: Normocephalic and atraumatic.      Right Ear: External ear normal.      Left Ear: External ear normal.      Nose: Nose normal.      Mouth/Throat:      Mouth: Mucous membranes are moist.   Eyes:      General:         Right eye: No discharge.         Left eye: No discharge.   Cardiovascular:      Rate and Rhythm: Normal rate.      Pulses: Normal pulses.   Pulmonary:      Breath sounds: Normal breath sounds.   Abdominal:      General: Bowel sounds are normal.      Palpations: Abdomen is soft.      Tenderness: There is abdominal tenderness.   Musculoskeletal:         General: Normal range of motion.   Skin:     General: Skin is warm and dry.   Neurological:      Mental Status: She is alert and oriented to person, place, and time.   Psychiatric:         Mood and Affect: Mood normal.         Behavior: Behavior normal.              Discharge Disposition  Home or Self Care    Discharge Medications     Discharge Medications        Continue These Medications        Instructions Start Date   acetaminophen 325 MG tablet  Commonly known as: TYLENOL   650 mg, Oral, Every 4 Hours PRN      cetirizine 10 MG tablet  Commonly known as: zyrTEC   10 mg, Oral, Daily      gabapentin 300 MG capsule  Commonly known as: NEURONTIN   300 mg, Oral, 3 Times Daily      Insulin Syringe 29G X 1/2\" 0.5 ML misc   1 each, Does not apply, 4 Times Daily PRN, May substitute any equivalent insulin syringe      lisinopril 5 MG tablet  Commonly known as: PRINIVIL,ZESTRIL   Take 1 tablet by mouth Daily.      metFORMIN 1000 MG tablet  Commonly known as: GLUCOPHAGE   Take 1 tablet by mouth Daily With Breakfast.      methylphenidate 54 MG CR tablet   54 mg, Oral, Every Morning      rosuvastatin 20 MG tablet  Commonly known as: CRESTOR   20 mg, Oral, Daily      sertraline 50 MG tablet  Commonly known as: ZOLOFT   Take 1 " tablet by mouth Daily.      Tirzepatide 5 MG/0.5ML solution pen-injector pen  Commonly known as: MOUNJARO   5 mg, Subcutaneous, Every 7 Days, Fri      topiramate 100 MG tablet  Commonly known as: TOPAMAX   100 mg, Oral, Daily      traMADol 50 MG tablet  Commonly known as: ULTRAM   50 mg, Oral, Every 6 Hours PRN               Discharge Diet:   Diet Instructions       Diet: Diabetic Diets; Consistent Carbohydrate; Regular (IDDSI 7); Thin (IDDSI 0)      Discharge Diet: Diabetic Diets    Diabetic Diet: Consistent Carbohydrate    Texture: Regular (IDDSI 7)    Fluid Consistency: Thin (IDDSI 0)            Activity at Discharge:   Activity Instructions       Activity as Tolerated              Follow-up Appointments  Future Appointments   Date Time Provider Department Center   1/13/2025  3:30 PM Mando Phillips MD [facility]     Additional Instructions for the Follow-ups that You Need to Schedule       Discharge Follow-up with PCP   As directed       Currently Documented PCP:    Aleah Rea APRN    PCP Phone Number:    [phone]     Follow Up Details: 1-2 weeks                Test Results Pending at Discharge  Pending Labs       Order Current Status    Urine Culture - Urine, Urine, Clean Catch In process             ANURADHA Fernandez  07/24/24  12:19 EDT    Time: Discharge 29 min

## 2024-07-24 NOTE — PLAN OF CARE
Problem: Adult Inpatient Plan of Care  Goal: Plan of Care Review  Outcome: Ongoing, Progressing  Flowsheets (Taken 7/24/2024 0138)  Progress: no change  Plan of Care Reviewed With: patient  Goal: Patient-Specific Goal (Individualized)  Outcome: Ongoing, Progressing  Goal: Absence of Hospital-Acquired Illness or Injury  Outcome: Ongoing, Progressing  Intervention: Identify and Manage Fall Risk  Recent Flowsheet Documentation  Taken 7/24/2024 0118 by Layla Best RN  Safety Promotion/Fall Prevention: safety round/check completed  Intervention: Prevent Skin Injury  Recent Flowsheet Documentation  Taken 7/24/2024 0118 by Layla Best RN  Body Position: position changed independently  Intervention: Prevent and Manage VTE (Venous Thromboembolism) Risk  Recent Flowsheet Documentation  Taken 7/24/2024 0118 by Layla Best RN  Activity Management: up ad neida  Goal: Optimal Comfort and Wellbeing  Outcome: Ongoing, Progressing  Intervention: Monitor Pain and Promote Comfort  Recent Flowsheet Documentation  Taken 7/24/2024 0118 by Layla Best RN  Pain Management Interventions: see MAR  Intervention: Provide Person-Centered Care  Recent Flowsheet Documentation  Taken 7/24/2024 0118 by Layla Best RN  Trust Relationship/Rapport:   care explained   choices provided   emotional support provided   empathic listening provided   questions answered   questions encouraged   reassurance provided   thoughts/feelings acknowledged  Goal: Readiness for Transition of Care  Outcome: Ongoing, Progressing     Problem: Pain Acute  Goal: Acceptable Pain Control and Functional Ability  Outcome: Ongoing, Progressing  Intervention: Prevent or Manage Pain  Recent Flowsheet Documentation  Taken 7/24/2024 0118 by Layla Best RN  Medication Review/Management: medications reviewed  Intervention: Develop Pain Management Plan  Recent Flowsheet Documentation  Taken 7/24/2024 0118 by Layla Best RN  Pain Management Interventions: see  MAR  Intervention: Optimize Psychosocial Wellbeing  Recent Flowsheet Documentation  Taken 7/24/2024 0118 by Layla Best, RN  Diversional Activities: television   Goal Outcome Evaluation:  Plan of Care Reviewed With: patient        Progress: no change

## 2024-07-24 NOTE — CASE MANAGEMENT/SOCIAL WORK
Discharge Planning Assessment   [facility]     Patient Name: Nora Tam  MRN: [id number]  Today's Date: 7/24/2024    Admit Date: 7/23/2024    Plan: Plan to return home alone.   Discharge Needs Assessment       Row Name 07/24/24 1331       Living Environment    People in Home alone    Current Living Arrangements home    Potentially Unsafe Housing Conditions none    In the past 12 months has the electric, gas, oil, or water company threatened to shut off services in your home? No    Primary Care Provided by self    Provides Primary Care For no one    Family Caregiver if Needed parent(s)    Family Caregiver Names Bailey/Nish - parents    Quality of Family Relationships helpful;involved;supportive    Able to Return to Prior Arrangements yes       Resource/Environmental Concerns    Resource/Environmental Concerns none    Transportation Concerns none       Transportation Needs    In the past 12 months, has lack of transportation kept you from medical appointments or from getting medications? no    In the past 12 months, has lack of transportation kept you from meetings, work, or from getting things needed for daily living? No       Food Insecurity    Within the past 12 months, you worried that your food would run out before you got the money to buy more. Never true    Within the past 12 months, the food you bought just didn't last and you didn't have money to get more. Never true       Transition Planning    Patient/Family Anticipates Transition to home    Patient/Family Anticipated Services at Transition none    Transportation Anticipated car, drives self;family or friend will provide       Discharge Needs Assessment    Readmission Within the Last 30 Days no previous admission in last 30 days    Equipment Currently Used at Home none    Concerns to be Addressed discharge planning    Anticipated Changes Related to Illness none    Equipment Needed After Discharge none                   Discharge Plan       Row Name  07/24/24 1332       Plan    Plan Plan to return home alone.    Patient/Family in Agreement with Plan yes    Plan Comments CM met with patient at bedside. Patient A&Ox4. Patient lives at home alone.  Family will transport at discharge. Patient performs ADLs. PCP and pharmacy confirmed. Agreeable to M2B.  Denies financial assistance needs for medication and/or food. Denies any current DME, HH, Caregiver, or rehab services.  Plan to dc home today 7/24, no CM needs.                  Continued Care and Services - Admitted Since 7/23/2024    No active coordination exists for this encounter.       Expected Discharge Date and Time       Expected Discharge Date Expected Discharge Time    Jul 24, 2024            Demographic Summary       Row Name 07/24/24 1331       General Information    Admission Type inpatient    Arrived From emergency department    Referral Source admission list    Reason for Consult discharge planning    Preferred Language English                   Functional Status       Row Name 07/24/24 1331       Functional Status    Usual Activity Tolerance good    Current Activity Tolerance good       Functional Status, IADL    Medications independent    Meal Preparation independent    Housekeeping independent    Laundry independent    Shopping independent       Mental Status    General Appearance WDL WDL       Mental Status Summary    Recent Changes in Mental Status/Cognitive Functioning no changes             YONATHAN Cintron RN  SIPS/ICU   O: [phone]  C: [phone]  [email or web address]

## 2024-07-24 NOTE — H&P
"    Patient Care Team:  Aleah Rea APRN as PCP - General (Pulmonary Disease)  Addi Askew MD as Consulting Physician (Nephrology)    Chief complaint elevated blood sugars    Subjective     Patient is a 44 y.o. female who presented with reports of fever, increased flank pain, lower abdominal pain and feeling \"off\". Patient states she had lithotripsy on July 12 and had a follow up with Urology on Friday and had a CT that day in their office. She denies any nausea, vomiting, diarrhea, dysuria. She states she completed Macrobid after surgery.   In the ER UA abdomen shows cholelithiasis with distended gallbladder, suggestive of acute cholelithiasis. CT A/P shows non obstructing left renal calculi  EKG sinus tach rate 111, potassium 3.1, magnesium 1.5. UA with trace leuks, 6-10 wbc's, trace bacteria, small yeast. Patient admitted for electrolyte replacement.     Onset of symptoms was ongoing    Review of Systems   Constitutional:  Positive for fatigue and fever.   HENT: Negative.     Eyes: Negative.    Respiratory: Negative.     Cardiovascular: Negative.    Gastrointestinal:  Positive for abdominal pain and nausea.   Endocrine: Negative.    Genitourinary: Negative.    Musculoskeletal: Negative.    Skin: Negative.    Neurological: Negative.    Psychiatric/Behavioral: Negative.            History  Past Medical History:   Diagnosis Date    DDD (degenerative disc disease), cervical     Diabetes mellitus     PCOS (polycystic ovarian syndrome)     Porphyria      Past Surgical History:   Procedure Laterality Date    CERVICAL SPINE SURGERY      CYSTOSCOPY, URETEROSCOPY, RETROGRADE PYELOGRAM, STENT INSERTION Right 09/20/2023    Procedure: CYSTOSCOPY URETEROSCOPY STONE BASKET EXTRACTION HOLMIUM LASER STENT INSERTION;  Surgeon: Cory Phelps MD;  Location: [facility] MAIN OR;  Service: Urology;  Laterality: Right;     Family History   Problem Relation Age of Onset    No Known Problems Mother     No Known Problems Father "     No Known Problems Sister     No Known Problems Brother     No Known Problems Maternal Aunt     No Known Problems Maternal Uncle     No Known Problems Paternal Aunt     No Known Problems Paternal Uncle     No Known Problems Maternal Grandmother     No Known Problems Maternal Grandfather     No Known Problems Paternal Grandmother     No Known Problems Paternal Grandfather     No Known Problems Other     Anemia Neg Hx     Arrhythmia Neg Hx     Asthma Neg Hx     Clotting disorder Neg Hx     Fainting Neg Hx     Heart attack Neg Hx     Heart disease Neg Hx     Heart failure Neg Hx     Hyperlipidemia Neg Hx     Hypertension Neg Hx      Social History     Tobacco Use    Smoking status: Every Day     Types: Electronic Cigarette     Passive exposure: Never    Smokeless tobacco: Never   Vaping Use    Vaping status: Every Day    Substances: Nicotine    Passive vaping exposure: Yes   Substance Use Topics    Alcohol use: Yes     Comment: occassionally    Drug use: Never     (Not in a hospital admission)    Allergies:  Adhesive tape and Cephalosporins    Objective     Vital Signs  Temp:  [98.4 °F (36.9 °C)] 98.4 °F (36.9 °C)  Heart Rate:  [100-120] 100  Resp:  [16-18] 18  BP: ()/(61-78) 104/74     Physical Exam:      General Appearance:    Alert, cooperative, in no acute distress   Head:    Normocephalic, without obvious abnormality, atraumatic   Eyes:            Lids and lashes normal, conjunctivae and sclerae normal, no   icterus, no pallor, corneas clear, PERRLA   Ears:    Ears appear intact with no abnormalities noted   Throat:   No oral lesions, no thrush, oral mucosa moist   Neck:   No adenopathy, supple, trachea midline, no thyromegaly, no   carotid bruit, no JVD   Lungs:     Clear to auscultation,respirations regular, even and                  unlabored    Heart:    Regular rhythm and normal rate, normal S1 and S2, no            murmur, no gallop, no rub, no click   Chest Wall:    No abnormalities observed    Abdomen:     Normal bowel sounds, no masses, no organomegaly, soft        non-tender, non-distended, no guarding, no rebound                tenderness   Extremities:   Moves all extremities well, no edema, no cyanosis, no             redness   Pulses:   Pulses palpable and equal bilaterally   Skin:   No bleeding, bruising or rash   Lymph nodes:   No palpable adenopathy   Neurologic:   No focal deficits noted       Results Review:     Imaging Results (Last 24 Hours)       Procedure Component Value Units Date/Time    US Abdomen Limited [[id number]] Collected: 07/23/24 2308     Updated: 07/23/24 2328    Narrative:      US ABDOMEN LIMITED    Date of Exam: 7/23/2024 10:56 PM EDT    Indication: abdominal pain, cholelithiasis.    Comparison: CT abdomen and pelvis dated 7/23/2024.    Technique: Grayscale and color Doppler ultrasound evaluation of the right upper quadrant was performed.      Findings:  The visualized portions of the pancreas appear within normal limits.    The liver is normal in size measuring 13.1 cm in craniocaudal dimension. There is normal echogenicity and echotexture of the liver parenchyma. There is no focal liver lesion. There is normal hepatopetal flow within the main portal vein.    The common bile duct is normal in caliber measuring 5 mm. The gallbladder is present without wall thickening. There is cholelithiasis. There is gallbladder distention. There is a negative sonographic Peres sign.    The right kidney measures 9.9 x 3.9 x 3.7 cm. There is no hydronephrosis. There is no ascites.      Impression:      Impression:  1. Cholelithiasis with distended gallbladder. No abnormal gallbladder wall thickening or pericholecystic fluid. Negative sonographic Peres sign. The above findings suggest against acute cholecystitis.  2. Normal caliber common bile duct measuring 5 mm.        Electronically Signed: Juan Escobar    7/23/2024 11:11 PM EDT    Workstation ID: [id number]    CT Abdomen Pelvis Stone  Protocol [[id number]] Collected: 07/23/24 2008     Updated: 07/23/24 2037    Narrative:      CT ABDOMEN PELVIS STONE PROTOCOL    Date of Exam: 7/23/2024 7:57 PM EDT    Indication: worsening flank pain, fever.    Comparison: 2/5/2024 and prior    Technique: Axial CT images were obtained of the abdomen and pelvis without the administration of contrast. Sagittal and coronal reconstructions were performed.  Automated exposure control and iterative reconstruction methods were used.        FINDINGS:    Abdomen/Pelvis:    Lower Chest: Limited imaging of the lung bases is grossly clear.    No free air noted below the diaphragm.    No free air noted below the diaphragm    Organs: Gallbladder is distended with stones noted dependently. No intraparotid biliary ductal dilation noted. Limited noncontrast imaging of the liver demonstrates no definite acute abnormality.    Nonobstructing calculi left kidney noted. No suspicious calculi along the visualized or expected course of the ureters bilaterally. Right kidney demonstrates cortical scarring and otherwise grossly unremarkable limited noncontrast imaging.    The pancreas, spleen and adrenal glands are grossly unremarkable    GI/Bowel: Limited noncontrast imaging of the stomach and small bowel are unremarkable in appearance. The colon demonstrates no definite acute abnormality. Appendix is visualized and appears within normal limits    Pelvis: Urinary bladder, uterus and ovaries are grossly unremarkable. Trace amount of air noted within the urinary bladder please correlate with procedure, catheterization. No suspicious fluid collection    Peritoneum/Retroperitoneum: Aorta is normal in caliber. No suspicious retroperitoneal adenopathy    Bones/Soft Tissues: No destructive bone lesion. Multilevel degenerative change of the spine again noted      Impression:        1. Cholelithiasis. Gallbladder is increased in distention compared to prior study. No significant inflammatory change  noted. Ultrasound would be more sensitive to evaluate if clinically indicated.    2. Nonobstructing left renal calculi. No evidence of obstructive uropathy on current exam.    3. No definite acute intra-abdominal or intrapelvic abnormality otherwise noted          Electronically Signed: Estuardo Ramírez MD    7/23/2024 8:35 PM EDT    Workstation ID: [id number]             Lab Results (last 24 hours)       Procedure Component Value Units Date/Time    Blood Gas, Venous - [[id number]]  (Abnormal) Collected: 07/23/24 2032    Specimen: Venous Blood Updated: 07/23/24 2115     Site Left Brachial     pH, Venous 7.370 pH Units      pCO2, Venous 38.8 mm Hg      pO2, Venous 23.0 mm Hg      HCO3, Venous 22.5 mmol/L      Base Excess, Venous -2.5 mmol/L      Comment: Serial Number: [id number]:  [id number]        O2 Saturation, Venous 38.6 %      CO2 Content 23.7 mmol/L      Barometric Pressure for Blood Gas --     Comment: N/A        Modality Room Air     FIO2 21 %     Acetone [[id number]]  (Normal) Collected: 07/23/24 1745    Specimen: Blood from Arm, Right Updated: 07/23/24 1955     Acetone Negative    Urinalysis, Microscopic Only - Urine, Clean Catch [[id number]]  (Abnormal) Collected: 07/23/24 1755    Specimen: Urine, Clean Catch Updated: 07/23/24 1905     RBC, UA 0-2 /HPF      WBC, UA 6-10 /HPF      Bacteria, UA Trace /HPF      Squamous Epithelial Cells, UA 3-6 /HPF      Yeast, UA Small/1+ Budding Yeast /HPF      Hyaline Casts, UA None Seen /LPF      Calcium Oxalate Crystals, UA Small/1+ /HPF      Mucus, UA Small/1+ /HPF      Methodology Manual Light Microscopy    Urine Culture - Urine, Urine, Clean Catch [[id number]] Collected: 07/23/24 1755    Specimen: Urine, Clean Catch Updated: 07/23/24 1905    Basic Metabolic Panel [[id number]]  (Abnormal) Collected: 07/23/24 1745    Specimen: Blood from Arm, Right Updated: 07/23/24 1839     Glucose 141 mg/dL      BUN 14 mg/dL      Creatinine 0.66 mg/dL      Sodium 138 mmol/L      Potassium  3.1 mmol/L      Chloride 101 mmol/L      CO2 19.9 mmol/L      Calcium 9.7 mg/dL      BUN/Creatinine Ratio 21.2     Anion Gap 17.1 mmol/L      eGFR 109.7 mL/min/1.73     Narrative:      GFR Normal >60  Chronic Kidney Disease <60  Kidney Failure <15      Magnesium [[id number]]  (Abnormal) Collected: 07/23/24 1745    Specimen: Blood from Arm, Right Updated: 07/23/24 1839     Magnesium 1.5 mg/dL     Urinalysis With Culture If Indicated - Urine, Clean Catch [[id number]]  (Abnormal) Collected: 07/23/24 1755    Specimen: Urine, Clean Catch Updated: 07/23/24 1808     Color, UA Yellow     Appearance, UA Clear     pH, UA 5.5     Specific Gravity, UA 1.023     Glucose,  mg/dL (Trace)     Ketones, UA Trace     Bilirubin, UA Negative     Blood, UA Negative     Protein, UA 30 mg/dL (1+)     Leuk Esterase, UA Small (1+)     Nitrite, UA Negative     Urobilinogen, UA 1.0 E.U./dL    Narrative:      In absence of clinical symptoms, the presence of pyuria, bacteria, and/or nitrites on the urinalysis result does not correlate with infection.    Pregnancy, Urine - Urine, Clean Catch [[id number]]  (Normal) Collected: 07/23/24 1755    Specimen: Urine, Clean Catch Updated: 07/23/24 1807     HCG, Urine QL Negative    CBC & Differential [[id number]]  (Normal) Collected: 07/23/24 1745    Specimen: Blood from Arm, Right Updated: 07/23/24 1751    Narrative:      The following orders were created for panel order CBC & Differential.  Procedure                               Abnormality         Status                     ---------                               -----------         ------                     CBC Auto Differential[[id number]]        Normal              Final result                 Please view results for these tests on the individual orders.    CBC Auto Differential [[id number]]  (Normal) Collected: 07/23/24 1745    Specimen: Blood from Arm, Right Updated: 07/23/24 1751     WBC 9.98 10*3/mm3      RBC 5.08 10*6/mm3      Hemoglobin 13.6  g/dL      Hematocrit 42.5 %      MCV 83.7 fL      MCH 26.8 pg      MCHC 32.0 g/dL      RDW 15.1 %      RDW-SD 45.3 fl      MPV 9.6 fL      Platelets 410 10*3/mm3      Neutrophil % 65.2 %      Lymphocyte % 24.2 %      Monocyte % 8.4 %      Eosinophil % 1.2 %      Basophil % 0.7 %      Immature Grans % 0.3 %      Neutrophils, Absolute 6.50 10*3/mm3      Lymphocytes, Absolute 2.42 10*3/mm3      Monocytes, Absolute 0.84 10*3/mm3      Eosinophils, Absolute 0.12 10*3/mm3      Basophils, Absolute 0.07 10*3/mm3      Immature Grans, Absolute 0.03 10*3/mm3      nRBC 0.0 /100 WBC              I reviewed the patient's new clinical results.    Assessment & Plan     Abdominal pain  Gallstone  Hypokalemia  Hypomagnesemia  -electrolyte replacement  -pain control  -IV fluids for hydration  -UA with small yeast- dose of diflucan      DVT prophylaxis- SCD's  GI prophylaxis- ppi    I discussed the patient's findings and my recommendations with patient.     Meghan Lechuga, APRN  07/23/24  23:45 EDT

## 2024-07-25 LAB — BACTERIA SPEC AEROBE CULT: NO GROWTH

## 2024-07-25 NOTE — OUTREACH NOTE
Prep Survey      Flowsheet Row Responses   [facility] facility patient discharged from? [facility]   Is LACE score < 7 ? No   Eligibility Readm Mgmt   Discharge diagnosis Abdominal pain   Does the patient have one of the following disease processes/diagnoses(primary or secondary)? Other   Does the patient have Home health ordered? No   Is there a DME ordered? No   Prep survey completed? Yes            ELZA DELACRUZ - Registered Nurse

## 2024-07-25 NOTE — CASE MANAGEMENT/SOCIAL WORK
Case Management Discharge Note      Final Note: Home         Selected Continued Care - Discharged on 7/24/2024 Admission date: 7/23/2024 - Discharge disposition: Home or Self Care       Transportation Services  Private: Car    Final Discharge Disposition Code: 01 - home or self-care      ODanny Cintron RN  SIPS/ICU   O: [phone]  C: [phone]  [email or web address]

## 2024-07-29 ENCOUNTER — READMISSION MANAGEMENT (OUTPATIENT)
Dept: CALL CENTER | Facility: HOSPITAL | Age: 44
End: 2024-07-29
Payer: COMMERCIAL

## 2024-07-29 NOTE — OUTREACH NOTE
Medical Week 1 Survey      Flowsheet Row Responses   [facility] facility patient discharged from? [facility]   Does the patient have one of the following disease processes/diagnoses(primary or secondary)? Other   Week 1 attempt successful? No   Unsuccessful attempts Attempt 1            Ivy Judge Registered Nurse

## 2024-07-31 ENCOUNTER — READMISSION MANAGEMENT (OUTPATIENT)
Dept: CALL CENTER | Facility: HOSPITAL | Age: 44
End: 2024-07-31
Payer: COMMERCIAL

## 2024-07-31 NOTE — OUTREACH NOTE
Medical Week 1 Survey      Flowsheet Row Responses   [facility] facility patient discharged from? [facility]   Does the patient have one of the following disease processes/diagnoses(primary or secondary)? Other   Week 1 attempt successful? No   Unsuccessful attempts Attempt 2            MARLON BRIZUELA - Registered Nurse

## 2024-08-05 ENCOUNTER — READMISSION MANAGEMENT (OUTPATIENT)
Dept: CALL CENTER | Facility: HOSPITAL | Age: 44
End: 2024-08-05
Payer: COMMERCIAL

## 2024-08-05 NOTE — OUTREACH NOTE
Medical Week 1 Survey      Flowsheet Row Responses   [facility] facility patient discharged from? [facility]   Does the patient have one of the following disease processes/diagnoses(primary or secondary)? Other   Week 1 attempt successful? No   Unsuccessful attempts Attempt 3            MONICA CID - Registered Nurse

## 2024-10-02 ENCOUNTER — LAB (OUTPATIENT)
Dept: LAB | Facility: HOSPITAL | Age: 44
End: 2024-10-02
Payer: COMMERCIAL

## 2024-10-02 ENCOUNTER — HOSPITAL ENCOUNTER (OUTPATIENT)
Dept: CARDIOLOGY | Facility: HOSPITAL | Age: 44
Discharge: HOME | End: 2024-10-02
Payer: COMMERCIAL

## 2024-10-02 DIAGNOSIS — N39.0 ACUTE UTI (URINARY TRACT INFECTION): Primary | ICD-10-CM

## 2024-10-02 LAB
ANION GAP SERPL CALCULATED.3IONS-SCNC: 14.7 MMOL/L (ref 5–15)
BUN SERPL-MCNC: 17 MG/DL (ref 6–20)
BUN/CREAT SERPL: 21.8 (ref 7–25)
CALCIUM SPEC-SCNC: 10.2 MG/DL (ref 8.6–10.5)
CHLORIDE SERPL-SCNC: 100 MMOL/L (ref 98–107)
CO2 SERPL-SCNC: 24.3 MMOL/L (ref 22–29)
CREAT SERPL-MCNC: 0.78 MG/DL (ref 0.57–1)
EGFRCR SERPLBLD CKD-EPI 2021: 95 ML/MIN/1.73
GLUCOSE SERPL-MCNC: 161 MG/DL (ref 65–99)
HBA1C MFR BLD: 7.9 % (ref 4.8–5.6)
POTASSIUM SERPL-SCNC: 3.4 MMOL/L (ref 3.5–5.2)
QT INTERVAL: 312 MS
QTC INTERVAL: 436 MS
SODIUM SERPL-SCNC: 139 MMOL/L (ref 136–145)

## 2024-10-02 PROCEDURE — 83036 HEMOGLOBIN GLYCOSYLATED A1C: CPT | Performed by: UROLOGY

## 2024-10-02 PROCEDURE — 93005 ELECTROCARDIOGRAM TRACING: CPT | Performed by: UROLOGY

## 2024-10-02 PROCEDURE — 80048 BASIC METABOLIC PNL TOTAL CA: CPT | Performed by: UROLOGY

## 2024-10-02 PROCEDURE — 36415 COLL VENOUS BLD VENIPUNCTURE: CPT | Performed by: UROLOGY

## 2024-10-02 RX ORDER — CEPHALEXIN 500 MG/1
500 CAPSULE ORAL 3 TIMES DAILY
Status: ON HOLD | COMMUNITY
End: 2024-10-07

## 2024-10-02 RX ORDER — CHOLECALCIFEROL (VITAMIN D3) 25 MCG
1000 TABLET ORAL DAILY
COMMUNITY

## 2024-10-02 RX ORDER — MAGNESIUM 200 MG
230 TABLET ORAL DAILY
COMMUNITY

## 2024-10-04 NOTE — PAT
Patient notified of low K+, instructed to increase foods high in K+ over weekend, verbalized understanding, will recheck K+ DOS

## 2024-10-07 ENCOUNTER — HOSPITAL ENCOUNTER (OUTPATIENT)
Facility: HOSPITAL | Age: 44
Setting detail: HOSPITAL OUTPATIENT SURGERY
Discharge: HOME | End: 2024-10-07
Attending: UROLOGY | Admitting: UROLOGY
Payer: COMMERCIAL

## 2024-10-07 ENCOUNTER — ANESTHESIA (OUTPATIENT)
Dept: PERIOP | Facility: HOSPITAL | Age: 44
End: 2024-10-07
Payer: COMMERCIAL

## 2024-10-07 ENCOUNTER — ANESTHESIA EVENT (OUTPATIENT)
Dept: PERIOP | Facility: HOSPITAL | Age: 44
End: 2024-10-07
Payer: COMMERCIAL

## 2024-10-07 ENCOUNTER — HOSPITAL ENCOUNTER (OUTPATIENT)
Dept: GENERAL RADIOLOGY | Facility: HOSPITAL | Age: 44
Discharge: HOME | End: 2024-10-07
Admitting: UROLOGY
Payer: COMMERCIAL

## 2024-10-07 VITALS
RESPIRATION RATE: 16 BRPM | DIASTOLIC BLOOD PRESSURE: 72 MMHG | HEART RATE: 81 BPM | TEMPERATURE: 98.6 F | SYSTOLIC BLOOD PRESSURE: 117 MMHG | BODY MASS INDEX: 24.34 KG/M2 | WEIGHT: 170 LBS | OXYGEN SATURATION: 99 % | HEIGHT: 70 IN

## 2024-10-07 DIAGNOSIS — N20.0 RENAL CALCULUS: ICD-10-CM

## 2024-10-07 DIAGNOSIS — N20.0 KIDNEY STONE: Primary | ICD-10-CM

## 2024-10-07 DIAGNOSIS — N20.0 CALCULUS, RENAL: ICD-10-CM

## 2024-10-07 LAB
B-HCG UR QL: NEGATIVE
BACTERIA UR QL AUTO: ABNORMAL /HPF
BILIRUB UR QL STRIP: NEGATIVE
CLARITY UR: ABNORMAL
COD CRY URNS QL: ABNORMAL /HPF
COLOR UR: YELLOW
GLUCOSE BLDC GLUCOMTR-MCNC: 120 MG/DL (ref 70–105)
GLUCOSE BLDC GLUCOMTR-MCNC: 212 MG/DL (ref 70–105)
GLUCOSE UR STRIP-MCNC: ABNORMAL MG/DL
HGB UR QL STRIP.AUTO: ABNORMAL
HYALINE CASTS UR QL AUTO: ABNORMAL /LPF
KETONES UR QL STRIP: NEGATIVE
LEUKOCYTE ESTERASE UR QL STRIP.AUTO: ABNORMAL
NITRITE UR QL STRIP: NEGATIVE
PH UR STRIP.AUTO: <=5 [PH] (ref 5–8)
POTASSIUM SERPL-SCNC: 3.7 MMOL/L (ref 3.5–5.2)
PROT UR QL STRIP: ABNORMAL
RBC # UR STRIP: ABNORMAL /HPF
REF LAB TEST METHOD: ABNORMAL
SP GR UR STRIP: 1.02 (ref 1–1.03)
SQUAMOUS #/AREA URNS HPF: ABNORMAL /HPF
UNIDENT CRYS URNS QL MICRO: ABNORMAL /HPF
UROBILINOGEN UR QL STRIP: ABNORMAL
WBC # UR STRIP: ABNORMAL /HPF

## 2024-10-07 PROCEDURE — C1769 GUIDE WIRE: HCPCS | Performed by: UROLOGY

## 2024-10-07 PROCEDURE — 25010000002 PROPOFOL 200 MG/20ML EMULSION: Performed by: NURSE ANESTHETIST, CERTIFIED REGISTERED

## 2024-10-07 PROCEDURE — 25010000002 LIDOCAINE PF 1% 1 % SOLUTION: Performed by: NURSE ANESTHETIST, CERTIFIED REGISTERED

## 2024-10-07 PROCEDURE — 25810000003 LACTATED RINGERS PER 1000 ML: Performed by: UROLOGY

## 2024-10-07 PROCEDURE — C1758 CATHETER, URETERAL: HCPCS | Performed by: UROLOGY

## 2024-10-07 PROCEDURE — 25010000002 PHENYLEPHRINE 10 MG/ML SOLUTION: Performed by: NURSE ANESTHETIST, CERTIFIED REGISTERED

## 2024-10-07 PROCEDURE — 25010000002 CEFAZOLIN PER 500 MG: Performed by: UROLOGY

## 2024-10-07 PROCEDURE — 25010000002 ONDANSETRON PER 1 MG: Performed by: NURSE ANESTHETIST, CERTIFIED REGISTERED

## 2024-10-07 PROCEDURE — 25010000002 DEXAMETHASONE PER 1 MG: Performed by: NURSE ANESTHETIST, CERTIFIED REGISTERED

## 2024-10-07 PROCEDURE — 82948 REAGENT STRIP/BLOOD GLUCOSE: CPT

## 2024-10-07 PROCEDURE — 76000 FLUOROSCOPY <1 HR PHYS/QHP: CPT

## 2024-10-07 PROCEDURE — 81025 URINE PREGNANCY TEST: CPT | Performed by: UROLOGY

## 2024-10-07 PROCEDURE — 25010000002 FENTANYL CITRATE (PF) 100 MCG/2ML SOLUTION: Performed by: NURSE ANESTHETIST, CERTIFIED REGISTERED

## 2024-10-07 PROCEDURE — C2617 STENT, NON-COR, TEM W/O DEL: HCPCS | Performed by: UROLOGY

## 2024-10-07 PROCEDURE — 25010000002 KETOROLAC TROMETHAMINE PER 15 MG: Performed by: NURSE ANESTHETIST, CERTIFIED REGISTERED

## 2024-10-07 PROCEDURE — 84132 ASSAY OF SERUM POTASSIUM: CPT | Performed by: UROLOGY

## 2024-10-07 DEVICE — URETERAL STENT
Type: IMPLANTABLE DEVICE | Site: URETER | Status: FUNCTIONAL
Brand: PERCUFLEX™ PLUS

## 2024-10-07 RX ORDER — LIDOCAINE HYDROCHLORIDE 10 MG/ML
INJECTION, SOLUTION EPIDURAL; INFILTRATION; INTRACAUDAL; PERINEURAL AS NEEDED
Status: DISCONTINUED | OUTPATIENT
Start: 2024-10-07 | End: 2024-10-07 | Stop reason: SURG

## 2024-10-07 RX ORDER — DIPHENHYDRAMINE HYDROCHLORIDE 50 MG/ML
12.5 INJECTION INTRAMUSCULAR; INTRAVENOUS ONCE AS NEEDED
Status: DISCONTINUED | OUTPATIENT
Start: 2024-10-07 | End: 2024-10-07 | Stop reason: HOSPADM

## 2024-10-07 RX ORDER — EPHEDRINE SULFATE 5 MG/ML
5 INJECTION INTRAVENOUS ONCE AS NEEDED
Status: DISCONTINUED | OUTPATIENT
Start: 2024-10-07 | End: 2024-10-07 | Stop reason: HOSPADM

## 2024-10-07 RX ORDER — LABETALOL HYDROCHLORIDE 5 MG/ML
5 INJECTION, SOLUTION INTRAVENOUS
Status: DISCONTINUED | OUTPATIENT
Start: 2024-10-07 | End: 2024-10-07 | Stop reason: HOSPADM

## 2024-10-07 RX ORDER — OXYCODONE HYDROCHLORIDE 5 MG/1
10 TABLET ORAL EVERY 4 HOURS PRN
Status: DISCONTINUED | OUTPATIENT
Start: 2024-10-07 | End: 2024-10-07 | Stop reason: HOSPADM

## 2024-10-07 RX ORDER — LIDOCAINE HYDROCHLORIDE 10 MG/ML
0.5 INJECTION, SOLUTION EPIDURAL; INFILTRATION; INTRACAUDAL; PERINEURAL ONCE AS NEEDED
Status: DISCONTINUED | OUTPATIENT
Start: 2024-10-07 | End: 2024-10-07 | Stop reason: HOSPADM

## 2024-10-07 RX ORDER — CEPHALEXIN 500 MG/1
500 CAPSULE ORAL 3 TIMES DAILY
Qty: 15 CAPSULE | Refills: 0 | Status: SHIPPED | OUTPATIENT
Start: 2024-10-07

## 2024-10-07 RX ORDER — NALOXONE HCL 0.4 MG/ML
0.4 VIAL (ML) INJECTION AS NEEDED
Status: DISCONTINUED | OUTPATIENT
Start: 2024-10-07 | End: 2024-10-07 | Stop reason: HOSPADM

## 2024-10-07 RX ORDER — HYDRALAZINE HYDROCHLORIDE 20 MG/ML
5 INJECTION INTRAMUSCULAR; INTRAVENOUS
Status: DISCONTINUED | OUTPATIENT
Start: 2024-10-07 | End: 2024-10-07 | Stop reason: HOSPADM

## 2024-10-07 RX ORDER — OXYCODONE HYDROCHLORIDE 5 MG/1
5 TABLET ORAL ONCE AS NEEDED
Status: DISCONTINUED | OUTPATIENT
Start: 2024-10-07 | End: 2024-10-07 | Stop reason: HOSPADM

## 2024-10-07 RX ORDER — FENTANYL CITRATE 50 UG/ML
INJECTION, SOLUTION INTRAMUSCULAR; INTRAVENOUS AS NEEDED
Status: DISCONTINUED | OUTPATIENT
Start: 2024-10-07 | End: 2024-10-07 | Stop reason: SURG

## 2024-10-07 RX ORDER — DIPHENHYDRAMINE HYDROCHLORIDE 50 MG/ML
12.5 INJECTION INTRAMUSCULAR; INTRAVENOUS
Status: DISCONTINUED | OUTPATIENT
Start: 2024-10-07 | End: 2024-10-07 | Stop reason: HOSPADM

## 2024-10-07 RX ORDER — IPRATROPIUM BROMIDE AND ALBUTEROL SULFATE 2.5; .5 MG/3ML; MG/3ML
3 SOLUTION RESPIRATORY (INHALATION) ONCE AS NEEDED
Status: DISCONTINUED | OUTPATIENT
Start: 2024-10-07 | End: 2024-10-07 | Stop reason: HOSPADM

## 2024-10-07 RX ORDER — SODIUM CHLORIDE, SODIUM LACTATE, POTASSIUM CHLORIDE, CALCIUM CHLORIDE 600; 310; 30; 20 MG/100ML; MG/100ML; MG/100ML; MG/100ML
1000 INJECTION, SOLUTION INTRAVENOUS CONTINUOUS
Status: DISCONTINUED | OUTPATIENT
Start: 2024-10-07 | End: 2024-10-07 | Stop reason: HOSPADM

## 2024-10-07 RX ORDER — SODIUM CHLORIDE 0.9 % (FLUSH) 0.9 %
10 SYRINGE (ML) INJECTION AS NEEDED
Status: DISCONTINUED | OUTPATIENT
Start: 2024-10-07 | End: 2024-10-07 | Stop reason: HOSPADM

## 2024-10-07 RX ORDER — MEPERIDINE HYDROCHLORIDE 25 MG/ML
12.5 INJECTION INTRAMUSCULAR; INTRAVENOUS; SUBCUTANEOUS
Status: DISCONTINUED | OUTPATIENT
Start: 2024-10-07 | End: 2024-10-07 | Stop reason: HOSPADM

## 2024-10-07 RX ORDER — PROPOFOL 10 MG/ML
INJECTION, EMULSION INTRAVENOUS AS NEEDED
Status: DISCONTINUED | OUTPATIENT
Start: 2024-10-07 | End: 2024-10-07 | Stop reason: SURG

## 2024-10-07 RX ORDER — KETOROLAC TROMETHAMINE 30 MG/ML
INJECTION, SOLUTION INTRAMUSCULAR; INTRAVENOUS AS NEEDED
Status: DISCONTINUED | OUTPATIENT
Start: 2024-10-07 | End: 2024-10-07 | Stop reason: SURG

## 2024-10-07 RX ORDER — PHENYLEPHRINE HYDROCHLORIDE 10 MG/ML
INJECTION INTRAVENOUS AS NEEDED
Status: DISCONTINUED | OUTPATIENT
Start: 2024-10-07 | End: 2024-10-07 | Stop reason: SURG

## 2024-10-07 RX ORDER — ONDANSETRON 2 MG/ML
INJECTION INTRAMUSCULAR; INTRAVENOUS AS NEEDED
Status: DISCONTINUED | OUTPATIENT
Start: 2024-10-07 | End: 2024-10-07 | Stop reason: SURG

## 2024-10-07 RX ORDER — OXYCODONE AND ACETAMINOPHEN 5; 325 MG/1; MG/1
1-2 TABLET ORAL EVERY 6 HOURS PRN
Qty: 15 TABLET | Refills: 0 | Status: SHIPPED | OUTPATIENT
Start: 2024-10-07

## 2024-10-07 RX ORDER — DEXAMETHASONE SODIUM PHOSPHATE 4 MG/ML
INJECTION, SOLUTION INTRA-ARTICULAR; INTRALESIONAL; INTRAMUSCULAR; INTRAVENOUS; SOFT TISSUE AS NEEDED
Status: DISCONTINUED | OUTPATIENT
Start: 2024-10-07 | End: 2024-10-07 | Stop reason: SURG

## 2024-10-07 RX ORDER — PHENAZOPYRIDINE HYDROCHLORIDE 100 MG/1
100 TABLET, FILM COATED ORAL 3 TIMES DAILY PRN
Qty: 15 TABLET | Refills: 0 | Status: SHIPPED | OUTPATIENT
Start: 2024-10-07

## 2024-10-07 RX ORDER — ONDANSETRON 2 MG/ML
4 INJECTION INTRAMUSCULAR; INTRAVENOUS ONCE AS NEEDED
Status: DISCONTINUED | OUTPATIENT
Start: 2024-10-07 | End: 2024-10-07 | Stop reason: HOSPADM

## 2024-10-07 RX ORDER — DOCUSATE SODIUM 100 MG/1
100 CAPSULE, LIQUID FILLED ORAL 2 TIMES DAILY PRN
Qty: 30 CAPSULE | Refills: 1 | Status: SHIPPED | OUTPATIENT
Start: 2024-10-07

## 2024-10-07 RX ADMIN — FENTANYL CITRATE 50 MCG: 50 INJECTION, SOLUTION INTRAMUSCULAR; INTRAVENOUS at 09:15

## 2024-10-07 RX ADMIN — KETOROLAC TROMETHAMINE 30 MG: 30 INJECTION, SOLUTION INTRAMUSCULAR at 09:38

## 2024-10-07 RX ADMIN — FENTANYL CITRATE 50 MCG: 50 INJECTION, SOLUTION INTRAMUSCULAR; INTRAVENOUS at 09:25

## 2024-10-07 RX ADMIN — DEXAMETHASONE SODIUM PHOSPHATE 4 MG: 4 INJECTION, SOLUTION INTRAMUSCULAR; INTRAVENOUS at 09:29

## 2024-10-07 RX ADMIN — PHENYLEPHRINE HYDROCHLORIDE 100 MCG: 10 INJECTION INTRAVENOUS at 09:19

## 2024-10-07 RX ADMIN — LIDOCAINE HYDROCHLORIDE 50 MG: 10 INJECTION, SOLUTION EPIDURAL; INFILTRATION; INTRACAUDAL; PERINEURAL at 09:15

## 2024-10-07 RX ADMIN — SODIUM CHLORIDE, POTASSIUM CHLORIDE, SODIUM LACTATE AND CALCIUM CHLORIDE 1000 ML: 600; 310; 30; 20 INJECTION, SOLUTION INTRAVENOUS at 09:08

## 2024-10-07 RX ADMIN — ONDANSETRON 4 MG: 2 INJECTION INTRAMUSCULAR; INTRAVENOUS at 09:29

## 2024-10-07 RX ADMIN — SODIUM CHLORIDE 2000 MG: 900 INJECTION INTRAVENOUS at 09:08

## 2024-10-07 RX ADMIN — PROPOFOL 240 MG: 10 INJECTION, EMULSION INTRAVENOUS at 09:15

## 2024-10-07 NOTE — ANESTHESIA POSTPROCEDURE EVALUATION
Patient: Nora Tam    Procedure Summary       Date: 10/07/24 Room / Location: [facility] OR 09 / [facility] MAIN OR    Anesthesia Start: 0910 Anesthesia Stop: 0950    Procedure: CYSTOSCOPY URETEROSCOPY LASER LITHOTRIPSY,  STONE BASKET EXTRACTION STENT EXCHANGE VERSUS REMOVAL (Left) Diagnosis:       Calculus, renal      (Calculus, renal [N20.0])    Surgeons: Cory Phelps MD Provider: Jermaine Gonsalez MD    Anesthesia Type: general ASA Status: 2            Anesthesia Type: general    Vitals  Vitals Value Taken Time   /76 10/07/24 1034   Temp 98.3 °F (36.8 °C) 10/07/24 1031   Pulse 78 10/07/24 1035   Resp 17 10/07/24 1031   SpO2 99 % 10/07/24 1035   Vitals shown include unfiled device data.        Post Anesthesia Care and Evaluation    Patient location during evaluation: PACU  Patient participation: complete - patient participated  Level of consciousness: awake  Pain scale: See nurse's notes for pain score.  Pain management: adequate    Airway patency: patent  Anesthetic complications: No anesthetic complications  PONV Status: none  Cardiovascular status: acceptable  Respiratory status: acceptable and spontaneous ventilation  Hydration status: acceptable    Comments: Patient seen and examined postoperatively; vital signs stable; SpO2 greater than or equal to 90%; cardiopulmonary status stable; nausea/vomiting adequately controlled; pain adequately controlled; no apparent anesthesia complications; patient discharged from anesthesia care when discharge criteria were met

## 2024-10-07 NOTE — ANESTHESIA PREPROCEDURE EVALUATION
Anesthesia Evaluation     Patient summary reviewed and Nursing notes reviewed   NPO Solid Status: > 8 hours  NPO Liquid Status: > 8 hours           Airway   Mallampati: II  TM distance: >3 FB  Neck ROM: full  No difficulty expected  Dental - normal exam         Pulmonary - normal exam   (+) ,sleep apnea  Cardiovascular - normal exam    ECG reviewed    (+) hypertension, dysrhythmias Tachycardia, hyperlipidemia      Neuro/Psych  (+) weakness, numbness  GI/Hepatic/Renal/Endo    (+) renal disease-, diabetes mellitus    Musculoskeletal     Abdominal  - normal exam    Bowel sounds: normal.   Substance History      OB/GYN          Other   arthritis,                   Anesthesia Plan    ASA 2     general     intravenous induction     Anesthetic plan, risks, benefits, and alternatives have been provided, discussed and informed consent has been obtained with: patient.      CODE STATUS:

## 2024-10-07 NOTE — OP NOTE
Urology Operative Note    10/7/2024    Nora Tam  44 y.o.  1980  female  [id number]      Surgeon(s) and Role:  Cory Phelps MD - Primary     Pre-operative Diagnosis: 4 mm left proximal stone and left renal stones    Post-operative Diagnosis: Same    Complications: None    Procedures:  Cystoscopy  Left ureteroscopy  Basket-extraction of left proximal ureteral stone  Basket extraction of left lower pole stone  Stent replacement  Fluoroscopy with Interpretation     Indications   Nora Tam is a 44 y.o. female who was found to have left proximal stone renal stones and UTI underwent stent placement here for ureteroscopy to clear her stones    During the informed consent process, the procedure was discussed in detail including the risks of bleeding, infection, damage to surrounding structures and need for staged procedure.      Findings     Fluoroscopy with interpretation: Stent removed wire placed in the upper pole.  Stent replaced with partial curl in the kidney    Description of procedure:  The patient was properly identified in the preoperative holding area and taken to the operating room where general anesthesia was induced. The patient was placed in the cystolithotomy position and prepped and draped in a sterile fashion. The patient was given antibiotics intravenously before the start of the surgery. After ensuring that all of the required equipment was ready and available a surgical timeout was performed.     A 21 Turkish rigid cystoscope was inserted into the bladder under direct visualization.  Stent grasped and removed  A Sensor wire was passed up the ureter under fluoroscopic guidance.  Flexible ureteroscope was advanced up alongside the wire.  The proximal ureteral stone was seen grasped and removed with the basket  No more stones were seen within the ureter under direct visualization.   Flexible ureteroscope was then advanced up alongside the wire up into the kidney.  All calyces were  inspected there were some intraparenchymal calcifications noted there were 2 small lower pole stones which were basketed out  The cystoscope was then replaced over the wire and a 6 Sinhala x 26cm stent was placed under direct and fluoroscopic visualization.  The bladder was then emptied and scope removed.    There were no apparent complications. The patient woke up in the operating room and was taken to the recovery room in stable condition.     I was present and scrubbed for the entire procedure.     Specimens: Stone    Estimated Blood Loss: minimal      Plan   -Follow up with Dr. Phelps for stent removal this week         Cory Phelps MD  [facility]  [facility], [address]  [place] [address] [phone]

## 2024-10-07 NOTE — ANESTHESIA PROCEDURE NOTES
Airway  Urgency: elective    Date/Time: 10/7/2024 9:16 AM  Airway not difficult    General Information and Staff    Patient location during procedure: OR    Indications and Patient Condition  Indications for airway management: airway protection    Preoxygenated: yes  MILS maintained throughout  Mask difficulty assessment: 1 - vent by mask    Final Airway Details  Final airway type: supraglottic airway      Successful airway: I-gel  Size 4     Number of attempts at approach: 1  Assessment: lips, teeth, and gum same as pre-op and atraumatic intubation

## 2024-10-10 LAB
QT INTERVAL: 312 MS
QTC INTERVAL: 436 MS

## 2024-10-11 ENCOUNTER — TELEPHONE (OUTPATIENT)
Dept: CARDIOLOGY | Facility: CLINIC | Age: 44
End: 2024-10-11
Payer: COMMERCIAL

## 2024-10-11 NOTE — TELEPHONE ENCOUNTER
Caller: Nora Tam    Relationship to patient: Self    Best call back number: [phone]    Chief complaint: ABNORMAL EKG    Type of visit: FOLLOW UP    Requested date: ASAP     If rescheduling, when is the original appointment: 1/13/25     Additional notes: PT IS CALLING TO SEE IF SHE NEEDS A SOONER APPT BECAUSE SHE HAD AN ABNORMAL EKG ON 10/2/24. PLEASE CALL PT

## 2024-10-15 LAB
CALCIUM OXALATE DIHYDRATE MFR STONE IR: 30 %
COLOR STONE: NORMAL
COM MFR STONE: 60 %
COMPN STONE: NORMAL
HYDROXYAPATITE: 10 %
LABORATORY COMMENT REPORT: NORMAL
Lab: NORMAL
Lab: NORMAL
PHOTO: NORMAL
SIZE STONE: NORMAL MM
SPEC SOURCE SUBJ: NORMAL
STONE ANALYSIS-IMP: NORMAL
WT STONE: 33 MG

## 2024-10-17 ENCOUNTER — HOSPITAL ENCOUNTER (EMERGENCY)
Facility: HOSPITAL | Age: 44
Discharge: HOME | End: 2024-10-17
Attending: EMERGENCY MEDICINE
Payer: COMMERCIAL

## 2024-10-17 ENCOUNTER — OFFICE VISIT (OUTPATIENT)
Dept: CARDIOLOGY | Facility: CLINIC | Age: 44
End: 2024-10-17
Payer: COMMERCIAL

## 2024-10-17 VITALS
WEIGHT: 171 LBS | BODY MASS INDEX: 24.48 KG/M2 | OXYGEN SATURATION: 98 % | SYSTOLIC BLOOD PRESSURE: 110 MMHG | DIASTOLIC BLOOD PRESSURE: 80 MMHG | HEART RATE: 107 BPM | HEIGHT: 70 IN

## 2024-10-17 VITALS
OXYGEN SATURATION: 97 % | HEIGHT: 70 IN | DIASTOLIC BLOOD PRESSURE: 72 MMHG | RESPIRATION RATE: 16 BRPM | TEMPERATURE: 98.1 F | BODY MASS INDEX: 24.46 KG/M2 | SYSTOLIC BLOOD PRESSURE: 108 MMHG | HEART RATE: 99 BPM | WEIGHT: 170.86 LBS

## 2024-10-17 DIAGNOSIS — N20.0 KIDNEY STONE: ICD-10-CM

## 2024-10-17 DIAGNOSIS — R00.2 PALPITATIONS: Primary | ICD-10-CM

## 2024-10-17 DIAGNOSIS — E87.6 HYPOKALEMIA: ICD-10-CM

## 2024-10-17 DIAGNOSIS — N39.0 URINARY TRACT INFECTION WITHOUT HEMATURIA, SITE UNSPECIFIED: Primary | ICD-10-CM

## 2024-10-17 DIAGNOSIS — E78.2 MIXED HYPERLIPIDEMIA: ICD-10-CM

## 2024-10-17 DIAGNOSIS — E80.0 ERYTHROPOIETIC PORPHYRIA: ICD-10-CM

## 2024-10-17 DIAGNOSIS — I47.19 ATRIAL TACHYCARDIA: ICD-10-CM

## 2024-10-17 DIAGNOSIS — I10 PRIMARY HYPERTENSION: ICD-10-CM

## 2024-10-17 DIAGNOSIS — M79.10 MYALGIA: ICD-10-CM

## 2024-10-17 LAB
ANION GAP SERPL CALCULATED.3IONS-SCNC: 10.1 MMOL/L (ref 5–15)
BACTERIA UR QL AUTO: ABNORMAL /HPF
BASOPHILS # BLD AUTO: 0.06 10*3/MM3 (ref 0–0.2)
BASOPHILS NFR BLD AUTO: 0.6 % (ref 0–1.5)
BILIRUB UR QL STRIP: NEGATIVE
BUN SERPL-MCNC: 18 MG/DL (ref 6–20)
BUN/CREAT SERPL: 25.7 (ref 7–25)
CALCIUM SPEC-SCNC: 8.8 MG/DL (ref 8.6–10.5)
CHLORIDE SERPL-SCNC: 99 MMOL/L (ref 98–107)
CLARITY UR: CLEAR
CO2 SERPL-SCNC: 24.9 MMOL/L (ref 22–29)
COLOR UR: YELLOW
CREAT SERPL-MCNC: 0.7 MG/DL (ref 0.57–1)
DEPRECATED RDW RBC AUTO: 41.5 FL (ref 37–54)
EGFRCR SERPLBLD CKD-EPI 2021: 108.2 ML/MIN/1.73
EOSINOPHIL # BLD AUTO: 0.12 10*3/MM3 (ref 0–0.4)
EOSINOPHIL NFR BLD AUTO: 1.2 % (ref 0.3–6.2)
ERYTHROCYTE [DISTWIDTH] IN BLOOD BY AUTOMATED COUNT: 13.2 % (ref 12.3–15.4)
GLUCOSE SERPL-MCNC: 277 MG/DL (ref 65–99)
GLUCOSE UR STRIP-MCNC: NEGATIVE MG/DL
HCT VFR BLD AUTO: 39.1 % (ref 34–46.6)
HGB BLD-MCNC: 12.3 G/DL (ref 12–15.9)
HGB UR QL STRIP.AUTO: NEGATIVE
HYALINE CASTS UR QL AUTO: ABNORMAL /LPF
IMM GRANULOCYTES # BLD AUTO: 0.06 10*3/MM3 (ref 0–0.05)
IMM GRANULOCYTES NFR BLD AUTO: 0.6 % (ref 0–0.5)
KETONES UR QL STRIP: NEGATIVE
LEUKOCYTE ESTERASE UR QL STRIP.AUTO: ABNORMAL
LYMPHOCYTES # BLD AUTO: 1.95 10*3/MM3 (ref 0.7–3.1)
LYMPHOCYTES NFR BLD AUTO: 19.5 % (ref 19.6–45.3)
MAGNESIUM SERPL-MCNC: 1.8 MG/DL (ref 1.6–2.6)
MCH RBC QN AUTO: 27.3 PG (ref 26.6–33)
MCHC RBC AUTO-ENTMCNC: 31.5 G/DL (ref 31.5–35.7)
MCV RBC AUTO: 86.7 FL (ref 79–97)
MONOCYTES # BLD AUTO: 0.63 10*3/MM3 (ref 0.1–0.9)
MONOCYTES NFR BLD AUTO: 6.3 % (ref 5–12)
NEUTROPHILS NFR BLD AUTO: 7.2 10*3/MM3 (ref 1.7–7)
NEUTROPHILS NFR BLD AUTO: 71.8 % (ref 42.7–76)
NITRITE UR QL STRIP: NEGATIVE
NRBC BLD AUTO-RTO: 0 /100 WBC (ref 0–0.2)
PH UR STRIP.AUTO: 5.5 [PH] (ref 5–8)
PLATELET # BLD AUTO: 403 10*3/MM3 (ref 140–450)
PMV BLD AUTO: 9.3 FL (ref 6–12)
POTASSIUM SERPL-SCNC: 3.6 MMOL/L (ref 3.5–5.2)
PROT UR QL STRIP: NEGATIVE
RBC # BLD AUTO: 4.51 10*6/MM3 (ref 3.77–5.28)
RBC # UR STRIP: ABNORMAL /HPF
REF LAB TEST METHOD: ABNORMAL
SODIUM SERPL-SCNC: 134 MMOL/L (ref 136–145)
SP GR UR STRIP: 1.02 (ref 1–1.03)
SQUAMOUS #/AREA URNS HPF: ABNORMAL /HPF
UROBILINOGEN UR QL STRIP: ABNORMAL
WBC # UR STRIP: ABNORMAL /HPF
WBC NRBC COR # BLD AUTO: 10.02 10*3/MM3 (ref 3.4–10.8)

## 2024-10-17 PROCEDURE — 85025 COMPLETE CBC W/AUTO DIFF WBC: CPT | Performed by: EMERGENCY MEDICINE

## 2024-10-17 PROCEDURE — 80048 BASIC METABOLIC PNL TOTAL CA: CPT | Performed by: EMERGENCY MEDICINE

## 2024-10-17 PROCEDURE — 81001 URINALYSIS AUTO W/SCOPE: CPT | Performed by: EMERGENCY MEDICINE

## 2024-10-17 PROCEDURE — 99283 EMERGENCY DEPT VISIT LOW MDM: CPT

## 2024-10-17 PROCEDURE — 83735 ASSAY OF MAGNESIUM: CPT | Performed by: EMERGENCY MEDICINE

## 2024-10-17 RX ORDER — SODIUM CHLORIDE 0.9 % (FLUSH) 0.9 %
10 SYRINGE (ML) INJECTION AS NEEDED
Status: DISCONTINUED | OUTPATIENT
Start: 2024-10-17 | End: 2024-10-17 | Stop reason: HOSPADM

## 2024-10-17 NOTE — PROGRESS NOTES
CC--- palpitations      Sub  44-year-old pleasant patient has known history of possible sinus node dysfunction and inappropriate sinus tachycardia and ectopic atrial tachycardia and was seen several years ago and comes in for follow-up.  She has history of palpitations and was seen by one of my colleagues recently.  She was last seen in my office 4 years ago.  She continues to have palpitations.  She has history of porphyria cutanea tarda and history of peripheral neuropathy in the past and also has hyperlipidemia, ADHD hypertension diabetes.  Patient recently had sepsis and significant nephrolithiasis needing procedures from urologist.  She has intermittent rapid tachycardia and also had 1 episode of diaphoresis and denies any syncope  She does complain of fatigue      Past Medical History:   Diagnosis Date    Abnormal ECG 12/2015    Ectopic Atrial Rythm Tachycardia    Arrhythmia 12/2015    Ectopic Atrial Rythm Tachycardia    DDD (degenerative disc disease), cervical     Diabetes mellitus     Frequent urinary tract infections     history of Kidney failure     history of Sleep apnea     pre weight loss, no current treatment    Hyperlipidemia 2019?    Medication    Neuropathy     PCOS (polycystic ovarian syndrome)     Porphyria     Tachycardia     atrial tachycardia     Past Surgical History:   Procedure Laterality Date    ABLATION OF DYSRHYTHMIC FOCUS  2016    Failed due to condition    CARDIAC CATHETERIZATION  2016    Attemted ablation    CERVICAL SPINE SURGERY      x2    CYSTOSCOPY, URETEROSCOPY, RETROGRADE PYELOGRAM, STENT INSERTION Right 09/20/2023    Procedure: CYSTOSCOPY URETEROSCOPY STONE BASKET EXTRACTION HOLMIUM LASER STENT INSERTION;  Surgeon: Cory Phelps MD;  Location: [facility] MAIN OR;  Service: Urology;  Laterality: Right;    CYSTOSCOPY, URETEROSCOPY, RETROGRADE PYELOGRAM, STENT INSERTION Left 10/07/2024    Procedure: CYSTOSCOPY URETEROSCOPY,  STONE BASKET EXTRACTION STENT EXCHANGE VERSUS REMOVAL;   Surgeon: Cory Phelps MD;  Location: [facility] MAIN OR;  Service: Urology;  Laterality: Left;     Family History   Problem Relation Age of Onset    Anemia Mother         History    Clotting disorder Mother         Peripheral ArteriL Disease    Hypertension Mother         Resistant to Medication    No Known Problems Father     No Known Problems Sister     No Known Problems Brother     No Known Problems Maternal Aunt     No Known Problems Maternal Uncle     No Known Problems Paternal Aunt     No Known Problems Paternal Uncle     No Known Problems Maternal Grandmother     No Known Problems Maternal Grandfather     Arrhythmia Paternal Grandmother         Pacemaker    No Known Problems Paternal Grandfather     No Known Problems Other     Asthma Neg Hx     Fainting Neg Hx     Heart attack Neg Hx     Heart disease Neg Hx     Heart failure Neg Hx     Hyperlipidemia Neg Hx      Social History     Tobacco Use    Smoking status: Former     Current packs/day: 0.00     Average packs/day: 1 pack/day for 21.0 years (21.0 ttl pk-yrs)     Types: Cigarettes, Electronic Cigarette     Start date: 1993     Quit date: 2015     Years since quittin.8     Passive exposure: Never    Smokeless tobacco: Never    Tobacco comments:     on again/ off again until I finally quit in - Currently Vape   Vaping Use    Vaping status: Every Day    Substances: Nicotine    Devices: Disposable, Pre-filled or refillable cartridge, Pre-filled pod    Passive vaping exposure: Yes   Substance Use Topics    Alcohol use: Not Currently     Comment: I drink when I want to.  My last drink was in May.    Drug use: Never       Physical Exam    General:      well developed, well nourished, in no acute distress.    Head:      normocephalic and atraumatic.    Eyes:      PERRL/EOM intact, conjunctivae and sclerae clear without nystagmus.    Neck:      no  thyromegaly, trachea central with normal respiratory effort  Lungs:      clear bilaterally to  auscultation.    Heart:       regular rate and rhythm, S1, S2 without murmurs, rubs, or gallops  Skin:      intact without lesions or rashes.    Psych:      alert and cooperative; normal mood and affect; normal attention span and concentration.        Assessment and plan    Recent cardiology notes and investigations reviewed.  Potassium is 3.4, normal creatinine and BUN and hemoglobin and platelets are normal  Ongoing ectopic atrial tachycardia--- differential diagnosis can be low atrial tachycardia from sinus node dysfunction but recently one of the ECGs revealed normal sinus tachycardia and suggestive of more of ectopic atrial tachycardia and the differential diagnosis could be tachycardia from triangle of Rodriguez, atypical AVNRT, unlikely to be AVRT or junctional ectopic tachycardia  Therapeutic options educated and it is reasonable to do an EP study and possible ablation if the patient has ectopic tachycardia.  Check TSH  Check echocardiogram to evaluate LV function  Hypertension controlled with lisinopril  Hyperlipidemia on rosuvastatin  Diabetes on metformin/Mounjaro and insulin  History of porphyria with neuropathy on gabapentin  Medications reviewed and follow-up appointments made    I have discussed options with the patient and explained to the patient and the family  We will check an echo and TSH and reevaluate this patient in 6 weeks and decide on EP study and possible ablation on this patient    Ongoing issues with nephrolithiasis followed by urology    ECG 12 Lead    Date/Time: 10/17/2024 9:34 AM  Performed by: Mando Phillips MD    Authorized by: Mando Phillips MD  Comparison: compared with previous ECG   Similar to previous ECG  Comparison to previous ECG: Ectopic atrial tachycardia      Electronically signed by Mando Phillips MD, 10/17/24, 9:34 AM EDT.

## 2024-10-17 NOTE — LETTER
October 17, 2024     ANURADHA Pinto  [address] [facility] [place] [address]    Patient: Nora Tam   YOB: 1980   Date of Visit: 10/17/2024     Dear ANURADHA Pinto:       Thank you for referring Nora Tam to me for evaluation. Below are the relevant portions of my assessment and plan of care.    If you have questions, please do not hesitate to call me. I look forward to following Nora along with you.         Sincerely,        Mando Phillips MD        CC: No Recipients    Mando Phillips MD  10/17/24 0944  Sign when Signing Visit  CC--- palpitations      Sub  44-year-old pleasant patient has known history of possible sinus node dysfunction and inappropriate sinus tachycardia and ectopic atrial tachycardia and was seen several years ago and comes in for follow-up.  She has history of palpitations and was seen by one of my colleagues recently.  She was last seen in my office 4 years ago.  She continues to have palpitations.  She has history of porphyria cutanea tarda and history of peripheral neuropathy in the past and also has hyperlipidemia, ADHD hypertension diabetes.  Patient recently had sepsis and significant nephrolithiasis needing procedures from urologist.  She has intermittent rapid tachycardia and also had 1 episode of diaphoresis and denies any syncope  She does complain of fatigue      Past Medical History:   Diagnosis Date   • Abnormal ECG 12/2015    Ectopic Atrial Rythm Tachycardia   • Arrhythmia 12/2015    Ectopic Atrial Rythm Tachycardia   • DDD (degenerative disc disease), cervical    • Diabetes mellitus    • Frequent urinary tract infections    • history of Kidney failure    • history of Sleep apnea     pre weight loss, no current treatment   • Hyperlipidemia 2019?    Medication   • Neuropathy    • PCOS (polycystic ovarian syndrome)    • Porphyria    • Tachycardia     atrial tachycardia     Past Surgical History:   Procedure Laterality  Date   • ABLATION OF DYSRHYTHMIC FOCUS  2016    Failed due to condition   • CARDIAC CATHETERIZATION  2016    Attemted ablation   • CERVICAL SPINE SURGERY      x2   • CYSTOSCOPY, URETEROSCOPY, RETROGRADE PYELOGRAM, STENT INSERTION Right 2023    Procedure: CYSTOSCOPY URETEROSCOPY STONE BASKET EXTRACTION HOLMIUM LASER STENT INSERTION;  Surgeon: Cory Phelps MD;  Location: [facility] MAIN OR;  Service: Urology;  Laterality: Right;   • CYSTOSCOPY, URETEROSCOPY, RETROGRADE PYELOGRAM, STENT INSERTION Left 10/07/2024    Procedure: CYSTOSCOPY URETEROSCOPY,  STONE BASKET EXTRACTION STENT EXCHANGE VERSUS REMOVAL;  Surgeon: Cory Phelps MD;  Location: [facility] MAIN OR;  Service: Urology;  Laterality: Left;     Family History   Problem Relation Age of Onset   • Anemia Mother         History   • Clotting disorder Mother         Peripheral ArteriL Disease   • Hypertension Mother         Resistant to Medication   • No Known Problems Father    • No Known Problems Sister    • No Known Problems Brother    • No Known Problems Maternal Aunt    • No Known Problems Maternal Uncle    • No Known Problems Paternal Aunt    • No Known Problems Paternal Uncle    • No Known Problems Maternal Grandmother    • No Known Problems Maternal Grandfather    • Arrhythmia Paternal Grandmother         Pacemaker   • No Known Problems Paternal Grandfather    • No Known Problems Other    • Asthma Neg Hx    • Fainting Neg Hx    • Heart attack Neg Hx    • Heart disease Neg Hx    • Heart failure Neg Hx    • Hyperlipidemia Neg Hx      Social History     Tobacco Use   • Smoking status: Former     Current packs/day: 0.00     Average packs/day: 1 pack/day for 21.0 years (21.0 ttl pk-yrs)     Types: Cigarettes, Electronic Cigarette     Start date: 1993     Quit date: 2015     Years since quittin.8     Passive exposure: Never   • Smokeless tobacco: Never   • Tobacco comments:     on again/ off again until I finally quit in 2015- Currently Vape    Vaping Use   • Vaping status: Every Day   • Substances: Nicotine   • Devices: Disposable, Pre-filled or refillable cartridge, Pre-filled pod   • Passive vaping exposure: Yes   Substance Use Topics   • Alcohol use: Not Currently     Comment: I drink when I want to.  My last drink was in May.   • Drug use: Never       Physical Exam    General:      well developed, well nourished, in no acute distress.    Head:      normocephalic and atraumatic.    Eyes:      PERRL/EOM intact, conjunctivae and sclerae clear without nystagmus.    Neck:      no  thyromegaly, trachea central with normal respiratory effort  Lungs:      clear bilaterally to auscultation.    Heart:       regular rate and rhythm, S1, S2 without murmurs, rubs, or gallops  Skin:      intact without lesions or rashes.    Psych:      alert and cooperative; normal mood and affect; normal attention span and concentration.        Assessment and plan    Recent cardiology notes and investigations reviewed.  Potassium is 3.4, normal creatinine and BUN and hemoglobin and platelets are normal  Ongoing ectopic atrial tachycardia--- differential diagnosis can be low atrial tachycardia from sinus node dysfunction but recently one of the ECGs revealed normal sinus tachycardia and suggestive of more of ectopic atrial tachycardia and the differential diagnosis could be tachycardia from triangle of Rodriguez, atypical AVNRT, unlikely to be AVRT or junctional ectopic tachycardia  Therapeutic options educated and it is reasonable to do an EP study and possible ablation if the patient has ectopic tachycardia.  Check TSH  Check echocardiogram to evaluate LV function  Hypertension controlled with lisinopril  Hyperlipidemia on rosuvastatin  Diabetes on metformin/Mounjaro and insulin  History of porphyria with neuropathy on gabapentin  Medications reviewed and follow-up appointments made    I have discussed options with the patient and explained to the patient and the family  We will  check an echo and TSH and reevaluate this patient in 6 weeks and decide on EP study and possible ablation on this patient    Ongoing issues with nephrolithiasis followed by urology    ECG 12 Lead    Date/Time: 10/17/2024 9:34 AM  Performed by: Mando Phillips MD    Authorized by: Mando Phillips MD  Comparison: compared with previous ECG   Similar to previous ECG  Comparison to previous ECG: Ectopic atrial tachycardia      Electronically signed by Mando Phillips MD, 10/17/24, 9:34 AM EDT.

## 2024-10-17 NOTE — ED PROVIDER NOTES
Subjective   History of Present Illness  Patient 44 old female complaining for muscle spasms intermittently body wide for the past 24 hours.  Denies cough fever Justin diarrhea dysuria or other complaint.      Review of Systems    Past Medical History:   Diagnosis Date    Abnormal ECG 12/2015    Ectopic Atrial Rythm Tachycardia    Arrhythmia 12/2015    Ectopic Atrial Rythm Tachycardia    DDD (degenerative disc disease), cervical     Diabetes mellitus     Frequent urinary tract infections     history of Kidney failure     history of Sleep apnea     pre weight loss, no current treatment    Hyperlipidemia 2019?    Medication    Neuropathy     PCOS (polycystic ovarian syndrome)     Porphyria     Tachycardia     atrial tachycardia       Allergies   Allergen Reactions    Cephalosporins Swelling    Adhesive Tape Other (See Comments)     Blisters, won't heal       Past Surgical History:   Procedure Laterality Date    ABLATION OF DYSRHYTHMIC FOCUS  2016    Failed due to condition    CARDIAC CATHETERIZATION  2016    Attemted ablation    CERVICAL SPINE SURGERY      x2    CYSTOSCOPY, URETEROSCOPY, RETROGRADE PYELOGRAM, STENT INSERTION Right 09/20/2023    Procedure: CYSTOSCOPY URETEROSCOPY STONE BASKET EXTRACTION HOLMIUM LASER STENT INSERTION;  Surgeon: Cory Phelps MD;  Location: [facility];  Service: Urology;  Laterality: Right;    CYSTOSCOPY, URETEROSCOPY, RETROGRADE PYELOGRAM, STENT INSERTION Left 10/07/2024    Procedure: CYSTOSCOPY URETEROSCOPY,  STONE BASKET EXTRACTION STENT EXCHANGE VERSUS REMOVAL;  Surgeon: Cory Phelps MD;  Location: [facility] OR;  Service: Urology;  Laterality: Left;       Family History   Problem Relation Age of Onset    Anemia Mother         History    Clotting disorder Mother         Peripheral ArteriL Disease    Hypertension Mother         Resistant to Medication    No Known Problems Father     No Known Problems Sister     No Known Problems Brother     No Known Problems Maternal Aunt     No  Known Problems Maternal Uncle     No Known Problems Paternal Aunt     No Known Problems Paternal Uncle     No Known Problems Maternal Grandmother     No Known Problems Maternal Grandfather     Arrhythmia Paternal Grandmother         Pacemaker    No Known Problems Paternal Grandfather     No Known Problems Other     Asthma Neg Hx     Fainting Neg Hx     Heart attack Neg Hx     Heart disease Neg Hx     Heart failure Neg Hx     Hyperlipidemia Neg Hx        Social History     Socioeconomic History    Marital status: Single   Tobacco Use    Smoking status: Former     Current packs/day: 0.00     Average packs/day: 1 pack/day for 21.0 years (21.0 ttl pk-yrs)     Types: Cigarettes, Electronic Cigarette     Start date: 1993     Quit date: 2015     Years since quittin.8     Passive exposure: Never    Smokeless tobacco: Never    Tobacco comments:     on again/ off again until I finally quit in - Currently Vape   Vaping Use    Vaping status: Every Day    Substances: Nicotine    Devices: Disposable, Pre-filled or refillable cartridge, Pre-filled pod    Passive vaping exposure: Yes   Substance and Sexual Activity    Alcohol use: Not Currently     Comment: I drink when I want to.  My last drink was in May.    Drug use: Never    Sexual activity: Not Currently     Partners: Male     Birth control/protection: Condom     Comment: Temporary choice           Objective   Physical Exam  Neck has no adenopathy JVD or bruits.  Lungs are clear.  Heart has regular rate rhythm without murmur rub or gallop.  Chest is nontender.  Abdomen soft nontender.  Extremity exam is unremarkable.  Procedures           ED Course      Results for orders placed or performed during the hospital encounter of 10/17/24   Urinalysis With Microscopic If Indicated (No Culture) - Urine, Clean Catch    Collection Time: 10/17/24 12:20 PM    Specimen: Urine, Clean Catch   Result Value Ref Range    Color, UA Yellow Yellow, Straw    Appearance, UA Clear  Clear    pH, UA 5.5 5.0 - 8.0    Specific Gravity, UA 1.017 1.005 - 1.030    Glucose, UA Negative Negative    Ketones, UA Negative Negative    Bilirubin, UA Negative Negative    Blood, UA Negative Negative    Protein, UA Negative Negative    Leuk Esterase, UA Small (1+) (A) Negative    Nitrite, UA Negative Negative    Urobilinogen, UA 0.2 E.U./dL 0.2 - 1.0 E.U./dL   Urinalysis, Microscopic Only - Urine, Clean Catch    Collection Time: 10/17/24 12:20 PM    Specimen: Urine, Clean Catch   Result Value Ref Range    RBC, UA 0-2 None Seen, 0-2 /HPF    WBC, UA 6-10 (A) None Seen, 0-2 /HPF    Bacteria, UA Trace (A) None Seen /HPF    Squamous Epithelial Cells, UA 7-12 (A) None Seen, 0-2 /HPF    Hyaline Casts, UA 0-2 None Seen /LPF    Methodology Automated Microscopy    Basic Metabolic Panel    Collection Time: 10/17/24 12:27 PM    Specimen: Arm, Right; Blood   Result Value Ref Range    Glucose 277 (H) 65 - 99 mg/dL    BUN 18 6 - 20 mg/dL    Creatinine 0.70 0.57 - 1.00 mg/dL    Sodium 134 (L) 136 - 145 mmol/L    Potassium 3.6 3.5 - 5.2 mmol/L    Chloride 99 98 - 107 mmol/L    CO2 24.9 22.0 - 29.0 mmol/L    Calcium 8.8 8.6 - 10.5 mg/dL    BUN/Creatinine Ratio 25.7 (H) 7.0 - 25.0    Anion Gap 10.1 5.0 - 15.0 mmol/L    eGFR 108.2 >60.0 mL/min/1.73   Magnesium    Collection Time: 10/17/24 12:27 PM    Specimen: Arm, Right; Blood   Result Value Ref Range    Magnesium 1.8 1.6 - 2.6 mg/dL   CBC Auto Differential    Collection Time: 10/17/24 12:27 PM    Specimen: Arm, Right; Blood   Result Value Ref Range    WBC 10.02 3.40 - 10.80 10*3/mm3    RBC 4.51 3.77 - 5.28 10*6/mm3    Hemoglobin 12.3 12.0 - 15.9 g/dL    Hematocrit 39.1 34.0 - 46.6 %    MCV 86.7 79.0 - 97.0 fL    MCH 27.3 26.6 - 33.0 pg    MCHC 31.5 31.5 - 35.7 g/dL    RDW 13.2 12.3 - 15.4 %    RDW-SD 41.5 37.0 - 54.0 fl    MPV 9.3 6.0 - 12.0 fL    Platelets 403 140 - 450 10*3/mm3    Neutrophil % 71.8 42.7 - 76.0 %    Lymphocyte % 19.5 (L) 19.6 - 45.3 %    Monocyte % 6.3 5.0 -  12.0 %    Eosinophil % 1.2 0.3 - 6.2 %    Basophil % 0.6 0.0 - 1.5 %    Immature Grans % 0.6 (H) 0.0 - 0.5 %    Neutrophils, Absolute 7.20 (H) 1.70 - 7.00 10*3/mm3    Lymphocytes, Absolute 1.95 0.70 - 3.10 10*3/mm3    Monocytes, Absolute 0.63 0.10 - 0.90 10*3/mm3    Eosinophils, Absolute 0.12 0.00 - 0.40 10*3/mm3    Basophils, Absolute 0.06 0.00 - 0.20 10*3/mm3    Immature Grans, Absolute 0.06 (H) 0.00 - 0.05 10*3/mm3    nRBC 0.0 0.0 - 0.2 /100 WBC                                            Medical Decision Making  UA shows trace bacteria.  Urine culture is obtained.  Metabolic panel shows no renal insufficiency or electrolyte abnormality.  Glucose is 277.  Patient has no leukocytosis no left shift and no anemia.  Patient would be discharged.  She will be placed on Augmentin.  She will see MD for recheck.    Amount and/or Complexity of Data Reviewed  Labs: ordered. Decision-making details documented in ED Course.    Risk  Prescription drug management.        Final diagnoses:   Urinary tract infection without hematuria, site unspecified   Myalgia       ED Disposition  ED Disposition       ED Disposition   Discharge    Condition   Stable    Comment   --               Aleah Rea APRN  [address] [facility] [place] [address] [phone]      As needed         Medication List        New Prescriptions      amoxicillin-clavulanate 875-125 MG per tablet  Commonly known as: AUGMENTIN  Take 1 tablet by mouth 2 (Two) Times a Day.               Where to Get Your Medications        These medications were sent to [id number] [place], [place] - [address] [place] [address] [address]  - [phone]  - [phone] [address] [place] [address]      Phone: [phone]   amoxicillin-clavulanate 875-125 MG per tablet            Craig Martin MD  10/17/24 1326       Craig Martin MD  10/17/24 [id number]

## 2024-11-27 ENCOUNTER — HOSPITAL ENCOUNTER (OUTPATIENT)
Dept: CARDIOLOGY | Facility: HOSPITAL | Age: 44
Discharge: HOME | End: 2024-11-27
Admitting: INTERNAL MEDICINE
Payer: COMMERCIAL

## 2024-11-27 VITALS
BODY MASS INDEX: 24.34 KG/M2 | HEIGHT: 70 IN | HEART RATE: 86 BPM | DIASTOLIC BLOOD PRESSURE: 78 MMHG | WEIGHT: 170 LBS | SYSTOLIC BLOOD PRESSURE: 129 MMHG

## 2024-11-27 DIAGNOSIS — I47.19 ATRIAL TACHYCARDIA: ICD-10-CM

## 2024-11-27 DIAGNOSIS — R00.2 PALPITATIONS: ICD-10-CM

## 2024-11-27 LAB
BH CV ECHO LEFT VENTRICLE GLOBAL LONGITUDINAL STRAIN: -18.8 %
BH CV ECHO MEAS - AO MAX PG: 3.8 MMHG
BH CV ECHO MEAS - AO MEAN PG: 2.06 MMHG
BH CV ECHO MEAS - AO ROOT DIAM: 2.7 CM
BH CV ECHO MEAS - AO V2 MAX: 97.3 CM/SEC
BH CV ECHO MEAS - AO V2 VTI: 18.1 CM
BH CV ECHO MEAS - AVA(I,D): 2.42 CM2
BH CV ECHO MEAS - EDV(CUBED): 32.5 ML
BH CV ECHO MEAS - EDV(MOD-SP4): 60.4 ML
BH CV ECHO MEAS - EF(MOD-BP): 55 %
BH CV ECHO MEAS - EF(MOD-SP4): 53.4 %
BH CV ECHO MEAS - ESV(CUBED): 9.2 ML
BH CV ECHO MEAS - ESV(MOD-SP4): 28.1 ML
BH CV ECHO MEAS - FS: 34.3 %
BH CV ECHO MEAS - IVS/LVPW: 1.1 CM
BH CV ECHO MEAS - IVSD: 0.92 CM
BH CV ECHO MEAS - LA DIMENSION: 3 CM
BH CV ECHO MEAS - LV MASS(C)D: 74.8 GRAMS
BH CV ECHO MEAS - LV MAX PG: 2.5 MMHG
BH CV ECHO MEAS - LV MEAN PG: 1.34 MMHG
BH CV ECHO MEAS - LV V1 MAX: 79.5 CM/SEC
BH CV ECHO MEAS - LV V1 VTI: 15.9 CM
BH CV ECHO MEAS - LVIDD: 3.2 CM
BH CV ECHO MEAS - LVIDS: 2.09 CM
BH CV ECHO MEAS - LVOT AREA: 2.7 CM2
BH CV ECHO MEAS - LVOT DIAM: 1.87 CM
BH CV ECHO MEAS - LVPWD: 0.84 CM
BH CV ECHO MEAS - MV A MAX VEL: 92.6 CM/SEC
BH CV ECHO MEAS - MV DEC SLOPE: 604.8 CM/SEC2
BH CV ECHO MEAS - MV DEC TIME: 0.12 SEC
BH CV ECHO MEAS - MV E MAX VEL: 73.3 CM/SEC
BH CV ECHO MEAS - MV E/A: 0.79
BH CV ECHO MEAS - MV MAX PG: 3.6 MMHG
BH CV ECHO MEAS - MV MEAN PG: 2.18 MMHG
BH CV ECHO MEAS - MV V2 VTI: 19 CM
BH CV ECHO MEAS - MVA(VTI): 2.31 CM2
BH CV ECHO MEAS - PA V2 MAX: 79.6 CM/SEC
BH CV ECHO MEAS - RV MAX PG: 1.94 MMHG
BH CV ECHO MEAS - RV V1 MAX: 69.6 CM/SEC
BH CV ECHO MEAS - RV V1 VTI: 13.8 CM
BH CV ECHO MEAS - SV(LVOT): 43.7 ML
BH CV ECHO MEAS - SV(MOD-SP4): 32.3 ML
BH CV ECHO MEAS - TR MAX PG: 14.6 MMHG
BH CV ECHO MEAS - TR MAX VEL: 190.7 CM/SEC

## 2024-11-27 PROCEDURE — 93356 MYOCRD STRAIN IMG SPCKL TRCK: CPT | Performed by: INTERNAL MEDICINE

## 2024-11-27 PROCEDURE — 93306 TTE W/DOPPLER COMPLETE: CPT | Performed by: INTERNAL MEDICINE

## 2024-11-27 PROCEDURE — 93356 MYOCRD STRAIN IMG SPCKL TRCK: CPT

## 2024-11-27 PROCEDURE — 93306 TTE W/DOPPLER COMPLETE: CPT

## 2024-12-10 ENCOUNTER — TELEPHONE (OUTPATIENT)
Dept: CARDIOLOGY | Facility: CLINIC | Age: 44
End: 2024-12-10
Payer: COMMERCIAL

## 2024-12-10 NOTE — TELEPHONE ENCOUNTER
Attempted to reach pt in regards to ECHo results. I left voice mail for return call.    Per Dr Phillips pt Echo is normal LV function.   Ok for hub to release.

## 2024-12-30 ENCOUNTER — OFFICE VISIT (OUTPATIENT)
Dept: CARDIOLOGY | Facility: CLINIC | Age: 44
End: 2024-12-30
Payer: COMMERCIAL

## 2024-12-30 VITALS
DIASTOLIC BLOOD PRESSURE: 81 MMHG | HEART RATE: 123 BPM | OXYGEN SATURATION: 96 % | HEIGHT: 70 IN | SYSTOLIC BLOOD PRESSURE: 121 MMHG | BODY MASS INDEX: 24.2 KG/M2 | WEIGHT: 169 LBS

## 2024-12-30 DIAGNOSIS — R00.2 PALPITATIONS: ICD-10-CM

## 2024-12-30 DIAGNOSIS — I47.19 ATRIAL TACHYCARDIA: Primary | ICD-10-CM

## 2024-12-30 DIAGNOSIS — I10 PRIMARY HYPERTENSION: ICD-10-CM

## 2024-12-30 DIAGNOSIS — E80.0 ERYTHROPOIETIC PORPHYRIA: ICD-10-CM

## 2024-12-30 PROCEDURE — 99213 OFFICE O/P EST LOW 20 MIN: CPT | Performed by: INTERNAL MEDICINE

## 2024-12-30 PROCEDURE — 93000 ELECTROCARDIOGRAM COMPLETE: CPT | Performed by: INTERNAL MEDICINE

## 2024-12-30 RX ORDER — FERROUS SULFATE 325(65) MG
325 TABLET ORAL
COMMUNITY

## 2024-12-30 NOTE — LETTER
December 30, 2024     ANURADHA Pinto  [address] [facility] [place] [address]    Patient: Nora Tam   YOB: 1980   Date of Visit: 12/30/2024     Dear ANURADHA Pinto:       Thank you for referring Nora Tam to me for evaluation. Below are the relevant portions of my assessment and plan of care.    If you have questions, please do not hesitate to call me. I look forward to following Nora along with you.         Sincerely,        Mando Phillips MD        CC: No Recipients    Mando Phillips MD  12/30/24 1003  Sign when Signing Visit  CC--- palpitations      Sub  44-year-old sandra patient has history of possible sinus node dysfunction and inappropriate sinus tachycardia and possible ectopic atrial tachycardia and comes in for follow-up.    Previous history attached below for reference  44-year-old sandra patient has known history of possible sinus node dysfunction and inappropriate sinus tachycardia and ectopic atrial tachycardia and was seen several years ago and comes in for follow-up.  She has history of palpitations and was seen by one of my colleagues recently.  She was last seen in my office 4 years ago.  She continues to have palpitations.  She has history of porphyria cutanea tarda and history of peripheral neuropathy in the past and also has hyperlipidemia, ADHD hypertension diabetes.  Patient recently had sepsis and significant nephrolithiasis needing procedures from urologist.  She has intermittent rapid tachycardia and also had 1 episode of diaphoresis and denies any syncope  She does complain of fatigue      Past Medical History:   Diagnosis Date   • Abnormal ECG 12/2015    Ectopic Atrial Rythm Tachycardia   • Arrhythmia 12/2015    Ectopic Atrial Rythm Tachycardia   • DDD (degenerative disc disease), cervical    • Diabetes mellitus    • Frequent urinary tract infections    • history of Kidney failure    • history of Sleep apnea     pre  weight loss, no current treatment   • Hyperlipidemia 2019?    Medication   • Neuropathy    • PCOS (polycystic ovarian syndrome)    • Porphyria    • Tachycardia     atrial tachycardia     Past Surgical History:   Procedure Laterality Date   • ABLATION OF DYSRHYTHMIC FOCUS  2016    Failed due to condition   • CARDIAC CATHETERIZATION  2016    Attemted ablation   • CERVICAL SPINE SURGERY      x2   • CYSTOSCOPY, URETEROSCOPY, RETROGRADE PYELOGRAM, STENT INSERTION Right 09/20/2023    Procedure: CYSTOSCOPY URETEROSCOPY STONE BASKET EXTRACTION HOLMIUM LASER STENT INSERTION;  Surgeon: Cory Phelps MD;  Location: [facility];  Service: Urology;  Laterality: Right;   • CYSTOSCOPY, URETEROSCOPY, RETROGRADE PYELOGRAM, STENT INSERTION Left 10/07/2024    Procedure: CYSTOSCOPY URETEROSCOPY,  STONE BASKET EXTRACTION STENT EXCHANGE VERSUS REMOVAL;  Surgeon: Cory Phelps MD;  Location: [facility];  Service: Urology;  Laterality: Left;     Family History   Problem Relation Age of Onset   • Anemia Mother         History   • Clotting disorder Mother         Peripheral ArteriL Disease   • Hypertension Mother         Resistant to Medication   • No Known Problems Father    • No Known Problems Sister    • No Known Problems Brother    • No Known Problems Maternal Aunt    • No Known Problems Maternal Uncle    • No Known Problems Paternal Aunt    • No Known Problems Paternal Uncle    • No Known Problems Maternal Grandmother    • No Known Problems Maternal Grandfather    • Arrhythmia Paternal Grandmother         Pacemaker   • No Known Problems Paternal Grandfather    • No Known Problems Other    • Asthma Neg Hx    • Fainting Neg Hx    • Heart attack Neg Hx    • Heart disease Neg Hx    • Heart failure Neg Hx    • Hyperlipidemia Neg Hx      Social History     Tobacco Use   • Smoking status: Former     Current packs/day: 0.00     Average packs/day: 1 pack/day for 21.0 years (21.0 ttl pk-yrs)     Types: Cigarettes, Electronic Cigarette      Start date: 1993     Quit date: 2015     Years since quittin.8     Passive exposure: Never   • Smokeless tobacco: Never   • Tobacco comments:     on again/ off again until I finally quit in 2015- Currently Vape   Vaping Use   • Vaping status: Every Day   • Substances: Nicotine   • Devices: Disposable, Pre-filled or refillable cartridge, Pre-filled pod   • Passive vaping exposure: Yes   Substance Use Topics   • Alcohol use: Not Currently     Comment: I drink when I want to.  My last drink was in May.   • Drug use: Never         Physical Exam    General:      well developed, well nourished, in no acute distress.    Head:      normocephalic and atraumatic.    Eyes:      PERRL/EOM intact, conjunctivae and sclerae clear without nystagmus.    Neck:      no  thyromegaly, trachea central with normal respiratory effort  Lungs:      clear bilaterally to auscultation.    Heart:       regular rate and rhythm, S1, S2 without murmurs, rubs, or gallops  Skin:      intact without lesions or rashes.    Psych:      alert and cooperative; normal mood and affect; normal attention span and concentration.        Assessment and plan    Recent potassium 3.6 with normal creatinine and normal magnesium  Sinus tachycardia and intermittent ectopic atrial tachycardia--- we have requested TSH results from primary care physician's office.  Discussed with patient.  If the TSH is not performed, TSH will be repeated which is important to know since patient has sinus tachycardia   Patient could not tolerate carvedilol in the past because of fatigue and other options for medical treatment if she has intermittent ectopic atrial tachycardia can be Cardizem which was discussed with the patient  Reevaluate in 3 months and decide on therapy for any recurrent arrhythmias  Echocardiogram with normal EF  Diabetes on metformin and Mounjaro  Hypertension on lisinopril  History of porphyria with neuropathy on gabapentin  Nephrolithiasis followed by  urology--- currently the problem has been resolved  Medications reviewed and follow-up appointments made      ECG 12 Lead    Date/Time: 12/30/2024 10:02 AM  Performed by: Mando Phillips MD    Authorized by: Mando Phillips MD  Comparison: compared with previous ECG   Similar to previous ECG  Rhythm: sinus tachycardia  Rate: tachycardic  Conduction: conduction normal      Electronically signed by Mando Phillips MD, 12/30/24, 10:02 AM EST.

## 2024-12-30 NOTE — PROGRESS NOTES
CC--- palpitations      Sub  44-year-old sandra patient has history of possible sinus node dysfunction and inappropriate sinus tachycardia and possible ectopic atrial tachycardia and comes in for follow-up.    Previous history attached below for reference  44-year-old sandra patient has known history of possible sinus node dysfunction and inappropriate sinus tachycardia and ectopic atrial tachycardia and was seen several years ago and comes in for follow-up.  She has history of palpitations and was seen by one of my colleagues recently.  She was last seen in my office 4 years ago.  She continues to have palpitations.  She has history of porphyria cutanea tarda and history of peripheral neuropathy in the past and also has hyperlipidemia, ADHD hypertension diabetes.  Patient recently had sepsis and significant nephrolithiasis needing procedures from urologist.  She has intermittent rapid tachycardia and also had 1 episode of diaphoresis and denies any syncope  She does complain of fatigue      Past Medical History:   Diagnosis Date   • Abnormal ECG 12/2015    Ectopic Atrial Rythm Tachycardia   • Arrhythmia 12/2015    Ectopic Atrial Rythm Tachycardia   • DDD (degenerative disc disease), cervical    • Diabetes mellitus    • Frequent urinary tract infections    • history of Kidney failure    • history of Sleep apnea     pre weight loss, no current treatment   • Hyperlipidemia 2019?    Medication   • Neuropathy    • PCOS (polycystic ovarian syndrome)    • Porphyria    • Tachycardia     atrial tachycardia     Past Surgical History:   Procedure Laterality Date   • ABLATION OF DYSRHYTHMIC FOCUS  2016    Failed due to condition   • CARDIAC CATHETERIZATION  2016    Attemted ablation   • CERVICAL SPINE SURGERY      x2   • CYSTOSCOPY, URETEROSCOPY, RETROGRADE PYELOGRAM, STENT INSERTION Right 09/20/2023    Procedure: CYSTOSCOPY URETEROSCOPY STONE BASKET EXTRACTION HOLMIUM LASER STENT INSERTION;  Surgeon: Cory Phelps MD;   Location: [facility];  Service: Urology;  Laterality: Right;   • CYSTOSCOPY, URETEROSCOPY, RETROGRADE PYELOGRAM, STENT INSERTION Left 10/07/2024    Procedure: CYSTOSCOPY URETEROSCOPY,  STONE BASKET EXTRACTION STENT EXCHANGE VERSUS REMOVAL;  Surgeon: Cory Phelps MD;  Location: [facility];  Service: Urology;  Laterality: Left;     Family History   Problem Relation Age of Onset   • Anemia Mother         History   • Clotting disorder Mother         Peripheral ArteriL Disease   • Hypertension Mother         Resistant to Medication   • No Known Problems Father    • No Known Problems Sister    • No Known Problems Brother    • No Known Problems Maternal Aunt    • No Known Problems Maternal Uncle    • No Known Problems Paternal Aunt    • No Known Problems Paternal Uncle    • No Known Problems Maternal Grandmother    • No Known Problems Maternal Grandfather    • Arrhythmia Paternal Grandmother         Pacemaker   • No Known Problems Paternal Grandfather    • No Known Problems Other    • Asthma Neg Hx    • Fainting Neg Hx    • Heart attack Neg Hx    • Heart disease Neg Hx    • Heart failure Neg Hx    • Hyperlipidemia Neg Hx      Social History     Tobacco Use   • Smoking status: Former     Current packs/day: 0.00     Average packs/day: 1 pack/day for 21.0 years (21.0 ttl pk-yrs)     Types: Cigarettes, Electronic Cigarette     Start date: 1993     Quit date: 2015     Years since quittin.8     Passive exposure: Never   • Smokeless tobacco: Never   • Tobacco comments:     on again/ off again until I finally quit in - Currently Vape   Vaping Use   • Vaping status: Every Day   • Substances: Nicotine   • Devices: Disposable, Pre-filled or refillable cartridge, Pre-filled pod   • Passive vaping exposure: Yes   Substance Use Topics   • Alcohol use: Not Currently     Comment: I drink when I want to.  My last drink was in May.   • Drug use: Never         Physical Exam    General:      well developed, well  nourished, in no acute distress.    Head:      normocephalic and atraumatic.    Eyes:      PERRL/EOM intact, conjunctivae and sclerae clear without nystagmus.    Neck:      no  thyromegaly, trachea central with normal respiratory effort  Lungs:      clear bilaterally to auscultation.    Heart:       regular rate and rhythm, S1, S2 without murmurs, rubs, or gallops  Skin:      intact without lesions or rashes.    Psych:      alert and cooperative; normal mood and affect; normal attention span and concentration.        Assessment and plan    Recent potassium 3.6 with normal creatinine and normal magnesium  Sinus tachycardia and intermittent ectopic atrial tachycardia--- we have requested TSH results from primary care physician's office.  Discussed with patient.  If the TSH is not performed, TSH will be repeated which is important to know since patient has sinus tachycardia   Patient could not tolerate carvedilol in the past because of fatigue and other options for medical treatment if she has intermittent ectopic atrial tachycardia can be Cardizem which was discussed with the patient  Reevaluate in 3 months and decide on therapy for any recurrent arrhythmias  Echocardiogram with normal EF  Diabetes on metformin and Mounjaro  Hypertension on lisinopril  History of porphyria with neuropathy on gabapentin  Nephrolithiasis followed by urology--- currently the problem has been resolved  Medications reviewed and follow-up appointments made      ECG 12 Lead    Date/Time: 12/30/2024 10:02 AM  Performed by: Mando Phillips MD    Authorized by: Mando Phillips MD  Comparison: compared with previous ECG   Similar to previous ECG  Rhythm: sinus tachycardia  Rate: tachycardic  Conduction: conduction normal      Electronically signed by Mando Phillips MD, 12/30/24, 10:02 AM EST.

## 2025-03-24 ENCOUNTER — OFFICE VISIT (OUTPATIENT)
Dept: CARDIOLOGY | Facility: CLINIC | Age: 45
End: 2025-03-24
Payer: COMMERCIAL

## 2025-03-24 VITALS
BODY MASS INDEX: 23.62 KG/M2 | DIASTOLIC BLOOD PRESSURE: 78 MMHG | HEART RATE: 118 BPM | HEIGHT: 70 IN | WEIGHT: 165 LBS | SYSTOLIC BLOOD PRESSURE: 106 MMHG

## 2025-03-24 DIAGNOSIS — R00.2 PALPITATIONS: ICD-10-CM

## 2025-03-24 DIAGNOSIS — E78.2 MIXED HYPERLIPIDEMIA: ICD-10-CM

## 2025-03-24 DIAGNOSIS — I10 PRIMARY HYPERTENSION: ICD-10-CM

## 2025-03-24 DIAGNOSIS — I47.19 ATRIAL TACHYCARDIA: Primary | ICD-10-CM

## 2025-03-24 DIAGNOSIS — E80.0 ERYTHROPOIETIC PORPHYRIA: ICD-10-CM

## 2025-03-24 PROCEDURE — 93000 ELECTROCARDIOGRAM COMPLETE: CPT | Performed by: INTERNAL MEDICINE

## 2025-03-24 PROCEDURE — 99214 OFFICE O/P EST MOD 30 MIN: CPT | Performed by: INTERNAL MEDICINE

## 2025-03-24 RX ORDER — CARBAMAZEPINE 100 MG/1
100 TABLET, EXTENDED RELEASE ORAL 3 TIMES DAILY PRN
COMMUNITY

## 2025-03-24 RX ORDER — BENZTROPINE MESYLATE 1 MG/1
1 TABLET ORAL 3 TIMES DAILY PRN
COMMUNITY

## 2025-03-24 NOTE — LETTER
March 24, 2025     ANURADHA Pinto  [address] [facility] [place] [address]    Patient: Nora Tam   YOB: 1980   Date of Visit: 3/24/2025     Dear ANURADHA Pinto:       Thank you for referring Nora Tam to me for evaluation. Below are the relevant portions of my assessment and plan of care.    If you have questions, please do not hesitate to call me. I look forward to following Nora along with you.         Sincerely,        Mando Phillips MD        CC: No Recipients    Mando Phillips MD  03/24/25 0924  Sign when Signing Visit  CC--- palpitations      Sub  44-year-old sandra patient has history of ectopic atrial tachycardia/sinus node possible dysfunction and inappropriate sinus tachycardia and came for follow-up    Prior neurology notes attached below for reference    Choreiform Movements  - Onset 9/2024 after urosepsis  - Has video which was reviewed with attending and movement specialist, shows movements that appear like chorea vs choreoathetosis vs functional  - 2/1/2025 MRI Brain w/wo - Personally reviewed.  mild small vessel ischemic disease.  Minimal cerebellar ectopia.  No lesions in BG or thalamus.  - No causative medications other than Concerta for ADHD which she has not been taking for the majority of these events  - No family history of similar movement disorders  - Unremarkable lab evaluation:  MILADIS, Cu, Mg, Ceroloplasmin, Ferritin, TSH, and CMP         Previous history attached below for reference  44-year-old sandra patient has known history of possible sinus node dysfunction and inappropriate sinus tachycardia and ectopic atrial tachycardia and was seen several years ago and comes in for follow-up.  She has history of palpitations and was seen by one of my colleagues recently.  She was last seen in my office 4 years ago.  She continues to have palpitations.  She has history of porphyria cutanea tarda and history of peripheral neuropathy  in the past and also has hyperlipidemia, ADHD hypertension diabetes.  Patient recently had sepsis and significant nephrolithiasis needing procedures from urologist.  She has intermittent rapid tachycardia and also had 1 episode of diaphoresis and denies any syncope  She does complain of fatigue      Past Medical History:   Diagnosis Date   • Abnormal ECG 12/2015    Ectopic Atrial Rythm Tachycardia   • Arrhythmia 12/2015    Ectopic Atrial Rythm Tachycardia   • DDD (degenerative disc disease), cervical    • Diabetes mellitus    • Frequent urinary tract infections    • history of Kidney failure    • history of Sleep apnea     pre weight loss, no current treatment   • Hyperlipidemia 2019?    Medication   • Neuropathy    • PCOS (polycystic ovarian syndrome)    • Porphyria    • Tachycardia     atrial tachycardia     Past Surgical History:   Procedure Laterality Date   • ABLATION OF DYSRHYTHMIC FOCUS  2016    Failed due to condition   • CARDIAC CATHETERIZATION  2016    Attemted ablation   • CERVICAL SPINE SURGERY      x2   • CYSTOSCOPY, URETEROSCOPY, RETROGRADE PYELOGRAM, STENT INSERTION Right 09/20/2023    Procedure: CYSTOSCOPY URETEROSCOPY STONE BASKET EXTRACTION HOLMIUM LASER STENT INSERTION;  Surgeon: Cory Phelps MD;  Location: [facility];  Service: Urology;  Laterality: Right;   • CYSTOSCOPY, URETEROSCOPY, RETROGRADE PYELOGRAM, STENT INSERTION Left 10/07/2024    Procedure: CYSTOSCOPY URETEROSCOPY,  STONE BASKET EXTRACTION STENT EXCHANGE VERSUS REMOVAL;  Surgeon: Cory Phelps MD;  Location: [facility] OR;  Service: Urology;  Laterality: Left;     Family History   Problem Relation Age of Onset   • Anemia Mother         History   • Clotting disorder Mother         Peripheral ArteriL Disease   • Hypertension Mother         Resistant to Medication   • No Known Problems Father    • No Known Problems Sister    • No Known Problems Brother    • No Known Problems Maternal Aunt    • No Known Problems Maternal Uncle     • No Known Problems Paternal Aunt    • No Known Problems Paternal Uncle    • No Known Problems Maternal Grandmother    • No Known Problems Maternal Grandfather    • Arrhythmia Paternal Grandmother         Pacemaker   • No Known Problems Paternal Grandfather    • No Known Problems Other    • Asthma Neg Hx    • Fainting Neg Hx    • Heart attack Neg Hx    • Heart disease Neg Hx    • Heart failure Neg Hx    • Hyperlipidemia Neg Hx      Social History     Tobacco Use   • Smoking status: Former     Current packs/day: 0.00     Average packs/day: 1 pack/day for 21.0 years (21.0 ttl pk-yrs)     Types: Cigarettes, Electronic Cigarette     Start date: 1993     Quit date: 2015     Years since quittin.8     Passive exposure: Never   • Smokeless tobacco: Never   • Tobacco comments:     on again/ off again until I finally quit in - Currently Vape   Vaping Use   • Vaping status: Every Day   • Substances: Nicotine   • Devices: Disposable, Pre-filled or refillable cartridge, Pre-filled pod   • Passive vaping exposure: Yes   Substance Use Topics   • Alcohol use: Not Currently     Comment: I drink when I want to.  My last drink was in May.   • Drug use: Never         Physical Exam    General:      well developed, well nourished, in no acute distress.    Head:      normocephalic and atraumatic.    Eyes:      PERRL/EOM intact, conjunctivae and sclerae clear without nystagmus.    Neck:      no  thyromegaly, trachea central with normal respiratory effort  Lungs:      clear bilaterally to auscultation.    Heart:       regular rate and rhythm, S1, S2 without murmurs, rubs, or gallops  Skin:      intact without lesions or rashes.    Psych:      alert and cooperative; normal mood and affect; normal attention span and concentration.               Assessment and plan  Recent TSH is normal potassium 3.5 and creatinine normal  Intermittent ectopic atrial tachycardia and TSH is normal and patient may benefit with EP study and  ablation of atrial tachycardia which was discussed with the patient --- patient currently asymptomatic and recovering from significant grief with loss of her sibling--- I will reevaluate this patient in 3 months and she continues to have intermittent ectopic atrial tachycardia, best option would be EP study and ablation  LVEF is normal  History of inappropriate sinus tachycardia   Hyperlipidemia on rosuvastatin  Diabetes on metformin and Mounjaro  Hypertension on lisinopril  History of nephrolithiasis followed by urology  Intolerance to carvedilol in the past        ECG 12 Lead    Date/Time: 3/24/2025 9:24 AM  Performed by: Mando Phillips MD    Authorized by: Mando Phillips MD  Comparison: compared with previous ECG   Comparison to previous ECG: Ectopic atrial tachycardia replaced sinus tachycardia compared to previous EKG      Electronically signed by Mando Phillips MD, 03/24/25, 9:24 AM EDT.

## 2025-03-24 NOTE — PROGRESS NOTES
CC--- palpitations      Sub  44-year-old sandra patient has history of ectopic atrial tachycardia/sinus node possible dysfunction and inappropriate sinus tachycardia and came for follow-up    Prior neurology notes attached below for reference    Choreiform Movements  - Onset 9/2024 after urosepsis  - Has video which was reviewed with attending and movement specialist, shows movements that appear like chorea vs choreoathetosis vs functional  - 2/1/2025 MRI Brain w/wo - Personally reviewed.  mild small vessel ischemic disease.  Minimal cerebellar ectopia.  No lesions in BG or thalamus.  - No causative medications other than Concerta for ADHD which she has not been taking for the majority of these events  - No family history of similar movement disorders  - Unremarkable lab evaluation:  MILADIS, Cu, Mg, Ceroloplasmin, Ferritin, TSH, and CMP         Previous history attached below for reference  44-year-old sandra patient has known history of possible sinus node dysfunction and inappropriate sinus tachycardia and ectopic atrial tachycardia and was seen several years ago and comes in for follow-up.  She has history of palpitations and was seen by one of my colleagues recently.  She was last seen in my office 4 years ago.  She continues to have palpitations.  She has history of porphyria cutanea tarda and history of peripheral neuropathy in the past and also has hyperlipidemia, ADHD hypertension diabetes.  Patient recently had sepsis and significant nephrolithiasis needing procedures from urologist.  She has intermittent rapid tachycardia and also had 1 episode of diaphoresis and denies any syncope  She does complain of fatigue      Past Medical History:   Diagnosis Date   • Abnormal ECG 12/2015    Ectopic Atrial Rythm Tachycardia   • Arrhythmia 12/2015    Ectopic Atrial Rythm Tachycardia   • DDD (degenerative disc disease), cervical    • Diabetes mellitus    • Frequent urinary tract infections    • history of Kidney  failure    • history of Sleep apnea     pre weight loss, no current treatment   • Hyperlipidemia 2019?    Medication   • Neuropathy    • PCOS (polycystic ovarian syndrome)    • Porphyria    • Tachycardia     atrial tachycardia     Past Surgical History:   Procedure Laterality Date   • ABLATION OF DYSRHYTHMIC FOCUS  2016    Failed due to condition   • CARDIAC CATHETERIZATION  2016    Attemted ablation   • CERVICAL SPINE SURGERY      x2   • CYSTOSCOPY, URETEROSCOPY, RETROGRADE PYELOGRAM, STENT INSERTION Right 09/20/2023    Procedure: CYSTOSCOPY URETEROSCOPY STONE BASKET EXTRACTION HOLMIUM LASER STENT INSERTION;  Surgeon: Cory Phelps MD;  Location: [facility];  Service: Urology;  Laterality: Right;   • CYSTOSCOPY, URETEROSCOPY, RETROGRADE PYELOGRAM, STENT INSERTION Left 10/07/2024    Procedure: CYSTOSCOPY URETEROSCOPY,  STONE BASKET EXTRACTION STENT EXCHANGE VERSUS REMOVAL;  Surgeon: Cory Phelps MD;  Location: [facility];  Service: Urology;  Laterality: Left;     Family History   Problem Relation Age of Onset   • Anemia Mother         History   • Clotting disorder Mother         Peripheral ArteriL Disease   • Hypertension Mother         Resistant to Medication   • No Known Problems Father    • No Known Problems Sister    • No Known Problems Brother    • No Known Problems Maternal Aunt    • No Known Problems Maternal Uncle    • No Known Problems Paternal Aunt    • No Known Problems Paternal Uncle    • No Known Problems Maternal Grandmother    • No Known Problems Maternal Grandfather    • Arrhythmia Paternal Grandmother         Pacemaker   • No Known Problems Paternal Grandfather    • No Known Problems Other    • Asthma Neg Hx    • Fainting Neg Hx    • Heart attack Neg Hx    • Heart disease Neg Hx    • Heart failure Neg Hx    • Hyperlipidemia Neg Hx      Social History     Tobacco Use   • Smoking status: Former     Current packs/day: 0.00     Average packs/day: 1 pack/day for 21.0 years (21.0 ttl pk-yrs)      Types: Cigarettes, Electronic Cigarette     Start date: 1993     Quit date: 2015     Years since quittin.8     Passive exposure: Never   • Smokeless tobacco: Never   • Tobacco comments:     on again/ off again until I finally quit in 2015- Currently Vape   Vaping Use   • Vaping status: Every Day   • Substances: Nicotine   • Devices: Disposable, Pre-filled or refillable cartridge, Pre-filled pod   • Passive vaping exposure: Yes   Substance Use Topics   • Alcohol use: Not Currently     Comment: I drink when I want to.  My last drink was in May.   • Drug use: Never         Physical Exam    General:      well developed, well nourished, in no acute distress.    Head:      normocephalic and atraumatic.    Eyes:      PERRL/EOM intact, conjunctivae and sclerae clear without nystagmus.    Neck:      no  thyromegaly, trachea central with normal respiratory effort  Lungs:      clear bilaterally to auscultation.    Heart:       regular rate and rhythm, S1, S2 without murmurs, rubs, or gallops  Skin:      intact without lesions or rashes.    Psych:      alert and cooperative; normal mood and affect; normal attention span and concentration.               Assessment and plan  Recent TSH is normal potassium 3.5 and creatinine normal  Intermittent ectopic atrial tachycardia and TSH is normal and patient may benefit with EP study and ablation of atrial tachycardia which was discussed with the patient --- patient currently asymptomatic and recovering from significant grief with loss of her sibling--- I will reevaluate this patient in 3 months and she continues to have intermittent ectopic atrial tachycardia, best option would be EP study and ablation  LVEF is normal  History of inappropriate sinus tachycardia   Hyperlipidemia on rosuvastatin  Diabetes on metformin and Mounjaro  Hypertension on lisinopril  History of nephrolithiasis followed by urology  Intolerance to carvedilol in the past        ECG 12  Lead    Date/Time: 3/24/2025 9:24 AM  Performed by: Mando Phillips MD    Authorized by: Mando Phillips MD  Comparison: compared with previous ECG   Comparison to previous ECG: Ectopic atrial tachycardia replaced sinus tachycardia compared to previous EKG      Electronically signed by Mando Phillips MD, 03/24/25, 9:24 AM EDT.

## 2025-03-29 NOTE — ED PROVIDER NOTES
Subjective   History of Present Illness  Chief complaint altered mental status speech difficulty trouble walking weakness    History of present illness a 43-year-old female who was released from the hospital on September 20 after having a laser therapy on a 7 mm stone UPJ and a stent placed and she subsequently had that removed and she had a UTI.  The patient states that yesterday throughout the day she was having some confusion and altered mental status felt funny dizzy trouble getting her words out and trouble walking.  She states has persisted all day today as well.  Patient went to see her primary care provider today went to the office they did a urine which was unremarkable and sent her to the ER.  She denies any headache she denies any loss of vision or slurring of her words just slow to get them out.  She denies any chest pain neck arm jaw pain no fever chills no urinary discomfort.  Nothing seems to really make it better or worse its been ongoing continuous since yesterday.  Denies any tick bites or rashes.      Review of Systems   Constitutional:  Positive for unexpected weight change. Negative for chills and fever.   HENT:  Negative for congestion.    Respiratory:  Negative for chest tightness and shortness of breath.    Cardiovascular:  Negative for chest pain and palpitations.   Gastrointestinal:  Positive for nausea and vomiting. Negative for abdominal pain.   Endocrine: Negative for cold intolerance and heat intolerance.   Genitourinary:  Negative for difficulty urinating and dysuria.   Musculoskeletal:  Positive for back pain. Negative for neck pain.   Neurological:  Positive for dizziness, speech difficulty and weakness. Negative for facial asymmetry and light-headedness.   Psychiatric/Behavioral:  Positive for confusion.      Past Medical History:   Diagnosis Date    DDD (degenerative disc disease), cervical     Diabetes mellitus     PCOS (polycystic ovarian syndrome)     Porphyria        Allergies    Allergen Reactions    Adhesive Tape Other (See Comments)    Cephalosporins Swelling    Lisinopril Swelling       Past Surgical History:   Procedure Laterality Date    CERVICAL SPINE SURGERY      CYSTOSCOPY, URETEROSCOPY, RETROGRADE PYELOGRAM, STENT INSERTION Right 9/20/2023    Procedure: CYSTOSCOPY URETEROSCOPY STONE BASKET EXTRACTION HOLMIUM LASER STENT INSERTION;  Surgeon: Cory Phelps MD;  Location: [facility];  Service: Urology;  Laterality: Right;       Family History   Problem Relation Age of Onset    No Known Problems Mother     No Known Problems Father     No Known Problems Sister     No Known Problems Brother     No Known Problems Maternal Aunt     No Known Problems Maternal Uncle     No Known Problems Paternal Aunt     No Known Problems Paternal Uncle     No Known Problems Maternal Grandmother     No Known Problems Maternal Grandfather     No Known Problems Paternal Grandmother     No Known Problems Paternal Grandfather     No Known Problems Other     Anemia Neg Hx     Arrhythmia Neg Hx     Asthma Neg Hx     Clotting disorder Neg Hx     Fainting Neg Hx     Heart attack Neg Hx     Heart disease Neg Hx     Heart failure Neg Hx     Hyperlipidemia Neg Hx     Hypertension Neg Hx        Social History     Socioeconomic History    Marital status: Single   Tobacco Use    Smoking status: Former     Types: Electronic Cigarette    Smokeless tobacco: Never   Vaping Use    Vaping Use: Former   Substance and Sexual Activity    Alcohol use: Yes     Comment: occassionally    Drug use: Never    Sexual activity: Defer     Prior to Admission medications    Medication Sig Start Date End Date Taking? Authorizing Provider   acetaminophen (TYLENOL) 325 MG tablet Take 2 tablets by mouth Every 4 (Four) Hours As Needed for Mild Pain. 9/20/23   Micaela Isabel MD   cyclobenzaprine (FLEXERIL) 10 MG tablet  12/26/19   Provider, MD Yina   gabapentin (NEURONTIN) 100 MG capsule 3 (Three) Times a Day. 12/29/19   Provider  MD Yina   insulin glargine (LANTUS, SEMGLEE) 100 UNIT/ML injection Inject 10 Units under the skin into the appropriate area as directed Every Night. 9/20/23   Micaela Isabel MD   levoFLOXacin (Levaquin) 750 MG tablet Take 1 tablet by mouth Daily. 9/21/23   Micaela Isabel MD   metFORMIN (GLUCOPHAGE) 1000 MG tablet Take 1 tablet by mouth 2 (Two) Times a Day With Meals. 9/21/23   Micaela Isabel MD   methylphenidate 27 MG CR tablet Take 2 tablets by mouth Every Morning    Yina Ken MD   nabumetone (RELAFEN) 750 MG tablet  1/5/20   Yina Ken MD   naproxen sodium (ALEVE) 220 MG tablet Take 1 tablet by mouth 2 (Two) Times a Day As Needed.    Yina Ken MD   rosuvastatin (CRESTOR) 20 MG tablet Take 1 tablet by mouth Daily.    Yina Ken MD   Tirzepatide (Mounjaro) 5 MG/0.5ML solution pen-injector Inject  under the skin into the appropriate area as directed 1 (One) Time Per Week.    Yina Ken MD   TROKENDI  MG capsule sustained-release 24 hr  1/6/20   Yina Ken MD            Objective   Physical Exam  Constitutional 43-year-old female awake alert no acute distress triage vital signs reviewed.  HEENT extraocular muscles are intact pupils equal round reactive sclera clear is no photophobia neck supple no adenopathy no JVD no bruits.  Lungs clear no retraction heart regular without murmur abdomen soft nontender good bowel sounds no peritoneal findings or pulsatile masses extremities pulses equal upper and lower extremities no edema cords or Homans' sign or evidence of DVT.  Skin warm dry without rashes neurologic awake alert orientated x4 no facial asymmetry speech normal tongue soft palate normal no drift the arms or legs patient walks without difficulty there is no ataxia there is no nystagmus.  Procedures           ED Course      Results for orders placed or performed during the hospital encounter of 10/04/23   Comprehensive Metabolic Panel     Specimen: Blood   Result Value Ref Range    Glucose 364 (H) 65 - 99 mg/dL    BUN 30 (H) 6 - 20 mg/dL    Creatinine 2.40 (H) 0.57 - 1.00 mg/dL    Sodium 135 (L) 136 - 145 mmol/L    Potassium 3.4 (L) 3.5 - 5.2 mmol/L    Chloride 95 (L) 98 - 107 mmol/L    CO2 22.0 22.0 - 29.0 mmol/L    Calcium 8.5 (L) 8.6 - 10.5 mg/dL    Total Protein 7.1 6.0 - 8.5 g/dL    Albumin 3.6 3.5 - 5.2 g/dL    ALT (SGPT) 5 1 - 33 U/L    AST (SGOT) 16 1 - 32 U/L    Alkaline Phosphatase 110 39 - 117 U/L    Total Bilirubin 0.3 0.0 - 1.2 mg/dL    Globulin 3.5 gm/dL    A/G Ratio 1.0 g/dL    BUN/Creatinine Ratio 12.5 7.0 - 25.0    Anion Gap 18.0 (H) 5.0 - 15.0 mmol/L    eGFR 24.8 (L) >60.0 mL/min/1.73   CBC Auto Differential    Specimen: Blood   Result Value Ref Range    WBC 9.00 3.40 - 10.80 10*3/mm3    RBC 4.38 3.77 - 5.28 10*6/mm3    Hemoglobin 11.6 (L) 12.0 - 15.9 g/dL    Hematocrit 36.0 34.0 - 46.6 %    MCV 82.3 79.0 - 97.0 fL    MCH 26.6 26.6 - 33.0 pg    MCHC 32.3 31.5 - 35.7 g/dL    RDW 14.9 12.3 - 15.4 %    RDW-SD 42.9 37.0 - 54.0 fl    MPV 7.6 6.0 - 12.0 fL    Platelets 416 140 - 450 10*3/mm3    Neutrophil % 78.1 (H) 42.7 - 76.0 %    Lymphocyte % 11.8 (L) 19.6 - 45.3 %    Monocyte % 7.3 5.0 - 12.0 %    Eosinophil % 2.1 0.3 - 6.2 %    Basophil % 0.7 0.0 - 1.5 %    Neutrophils, Absolute 7.00 1.70 - 7.00 10*3/mm3    Lymphocytes, Absolute 1.10 0.70 - 3.10 10*3/mm3    Monocytes, Absolute 0.70 0.10 - 0.90 10*3/mm3    Eosinophils, Absolute 0.20 0.00 - 0.40 10*3/mm3    Basophils, Absolute 0.10 0.00 - 0.20 10*3/mm3    nRBC 0.1 0.0 - 0.2 /100 WBC   Magnesium    Specimen: Blood   Result Value Ref Range    Magnesium 1.2 (L) 1.6 - 2.6 mg/dL   Urinalysis With Microscopic If Indicated (No Culture) - Urine, Clean Catch    Specimen: Urine, Clean Catch   Result Value Ref Range    Color, UA Yellow Yellow, Straw    Appearance, UA Turbid (A) Clear    pH, UA <=5.0 5.0 - 8.0    Specific Gravity, UA 1.017 1.005 - 1.030    Glucose, UA Negative Negative    Ketones,  UA Trace (A) Negative    Bilirubin, UA Negative Negative    Blood, UA Negative Negative    Protein,  mg/dL (2+) (A) Negative    Leuk Esterase, UA Moderate (2+) (A) Negative    Nitrite, UA Negative Negative    Urobilinogen, UA 1.0 E.U./dL 0.2 - 1.0 E.U./dL   Urinalysis, Microscopic Only - Urine, Clean Catch    Specimen: Urine, Clean Catch   Result Value Ref Range    RBC, UA 0-2 (A) None Seen /HPF    WBC, UA Too Numerous to Count (A) None Seen /HPF    Bacteria, UA 2+ (A) None Seen /HPF    Squamous Epithelial Cells, UA 13-20 (A) None Seen, 0-2 /HPF    Yeast, UA Large/3+ Budding Yeast None Seen /HPF    Hyaline Casts, UA 3-6 None Seen /LPF    Granular Casts, UA 3-6 None Seen /LPF    Amorphous Urate Crystals, UA Moderate/2+ None Seen /HPF    Methodology Manual Light Microscopy    POC Glucose Once    Specimen: Blood   Result Value Ref Range    Glucose 257 (H) 70 - 105 mg/dL   ECG 12 Lead Stroke Evaluation   Result Value Ref Range    QT Interval 348 ms    QTC Interval 455 ms   Gold Top - SST   Result Value Ref Range    Extra Tube hold    Light Blue Top   Result Value Ref Range    Extra Tube Hold for add-ons.      CT Head Without Contrast    Result Date: 10/4/2023  Impression: No acute intracranial pathology. Electronically Signed: Cleveland Bruce MD  10/4/2023 9:56 PM EDT  Workstation ID: [id number]    XR Chest 1 View    Result Date: 10/4/2023  Impression: No acute pulmonary disease. Electronically Signed: Cleveland Bruce MD  10/4/2023 9:42 PM EDT  Workstation ID: [id number]     Medications   sodium chloride 0.9 % flush 10 mL (has no administration in time range)   sodium chloride 0.9 % flush 10 mL (10 mL Intravenous Given 10/4/23 2226)   sodium chloride 0.9 % flush 10 mL (has no administration in time range)   sodium chloride 0.9 % infusion 40 mL (has no administration in time range)   sennosides-docusate (PERICOLACE) 8.6-50 MG per tablet 2 tablet (has no administration in time range)     And   polyethylene glycol  verbal instruction/written material/daughter instructed (MIRALAX) packet 17 g (has no administration in time range)     And   bisacodyl (DULCOLAX) EC tablet 5 mg (has no administration in time range)     And   bisacodyl (DULCOLAX) suppository 10 mg (has no administration in time range)   Potassium Replacement - Follow Nurse / BPA Driven Protocol (has no administration in time range)   Magnesium Standard Dose Replacement - Follow Nurse / BPA Driven Protocol (has no administration in time range)   Phosphorus Replacement - Follow Nurse / BPA Driven Protocol (has no administration in time range)   Calcium Replacement - Follow Nurse / BPA Driven Protocol (has no administration in time range)   acetaminophen (TYLENOL) tablet 650 mg (has no administration in time range)   melatonin tablet 5 mg (has no administration in time range)   ondansetron (ZOFRAN) tablet 4 mg (has no administration in time range)     Or   ondansetron (ZOFRAN) injection 4 mg (has no administration in time range)   pantoprazole (PROTONIX) injection 40 mg (has no administration in time range)   hydrALAZINE (APRESOLINE) injection 10 mg (has no administration in time range)   magnesium sulfate 2g/50 mL (PREMIX) infusion (has no administration in time range)   potassium chloride (K-DUR,KLOR-CON) CR tablet 40 mEq (has no administration in time range)   rosuvastatin (CRESTOR) tablet 20 mg (has no administration in time range)   dextrose (GLUTOSE) oral gel 15 g (has no administration in time range)   dextrose (D50W) (25 g/50 mL) IV injection 25 g (has no administration in time range)   glucagon (GLUCAGEN) injection 1 mg (has no administration in time range)   insulin lispro (HUMALOG/ADMELOG) injection 2-9 Units (has no administration in time range)   sodium chloride 0.9 % bolus 1,000 mL (1,000 mL Intravenous New Bag 10/4/23 2224)     EKG my interpretation ectopic atrial rhythm rate of 100 normal axis no hypertrophy QTc of 455 this is an abnormal EKG but unchanged from 9/19/2023  Record reviewed.  Patient just left  the hospital within the last couple weeks she had a kidney stone stent UTI.  She had a BUN of 29 creatinine 0.61 on September 20 today creatinine is 2.4.                                       Medical Decision Making  Medical decision making.  IV established monitor placed my review of sinus rhythm EKG obtained reviewed 8 ectopic atrial rhythm rate of 100 unchanged from previous from 9/19/2023.  The patient had 1 L normal saline bolus given.  Labs obtained independent reviewed by me comprehensive metabolic profile independent reviewed by me glucose of 364 BUN of 30 creatinine 2.4 potassium 3.4 this is significant change from just on 20 September her creatinine was normal.  On her records 1 reviewed 0.69.  CBC unremarkable hemoglobin 11.6.  Magnesium 1.2 the urinalysis showed moderate leukocyte 2+ bacteria and 2 miscount white cells.  I did culture that.  But she did just have a stent placed.  Patient's has significant allergies to cephalosporins.  Her culture was checked in the last hospitalization had some Pseudomonas which was sensitive to the cephalosporins and the Levaquin.  She does have some worsening of her kidney function his creatinine is 2.40 and it was 0.69 on 20 September when reviewed.  We will give her 1 dose of Levaquin 750 mg IV pending urine culture at this time.  Patient was given a liter normal saline as well.  I will see evidence of generalized sepsis on clinical exam I do not see evidence of acute intra-abdominal process.  I will see evidence that would suggest least at this moment stroke.  I did do a CT head without my independent review shows no tumors masses hemorrhage or acute findings.  Radiology unremarkable.  Chest x-ray obtained independently by me Elsea evidence of a pneumonia pneumothorax or acute findings.  Out consider TIA strokes posterior ischemia versus metabolic process with infection in the urine and acute kidney injury.  Complete list of all possibilities but will need much more  work-up.  I talked to the nurse practitioner for Dr. Richardson.  Patient made aware of findings and should be admitted for further care stable otherwise unremarkable ER course.    Problems Addressed:  Acute kidney injury: complicated acute illness or injury  Altered mental status, unspecified altered mental status type: complicated acute illness or injury  Weakness: complicated acute illness or injury    Amount and/or Complexity of Data Reviewed  External Data Reviewed: labs and notes.  Labs: ordered. Decision-making details documented in ED Course.  Radiology: ordered and independent interpretation performed. Decision-making details documented in ED Course.    Risk  Decision regarding hospitalization.        Final diagnoses:   Altered mental status, unspecified altered mental status type   Weakness   Acute kidney injury       ED Disposition  ED Disposition       ED Disposition   Decision to Admit    Condition   --    Comment   Level of Care: Telemetry [5]   Admitting Physician: TERESA RICHARDSON [[id number]   Attending Physician: TERESA RICHARDSON [[id number]]                 No follow-up provider specified.       Medication List      No changes were made to your prescriptions during this visit.            Amarjit Atkins MD  10/04/23 1275

## (undated) DEVICE — PRT BIOP SEALS

## (undated) DEVICE — KT SURG TURNOVER 050

## (undated) DEVICE — SYS IRR PUMP SGL ACTN VAC SYR 10CC

## (undated) DEVICE — PK CYSTO 50

## (undated) DEVICE — NITINOL STONE RETRIEVAL BASKET: Brand: ZERO TIP

## (undated) DEVICE — NITINOL WIRE WITH HYDROPHILIC TIP: Brand: SENSOR

## (undated) DEVICE — PREP SPRY PVPI 10P 2OZ

## (undated) DEVICE — GLV SURG SIGNATURE ESSENTIAL PF LTX SZ7

## (undated) DEVICE — SOL IRRG H2O BG 3000ML STRL

## (undated) DEVICE — SOLUTION,WATER,IRRIGATION,1000ML,STERILE: Brand: MEDLINE

## (undated) DEVICE — CATH URETRL OPN/END 5F70CM

## (undated) DEVICE — TUBING, SUCTION, 1/4" X 12', STRAIGHT: Brand: MEDLINE

## (undated) DEVICE — SYR LUERLOK 30CC

## (undated) DEVICE — FIBR LASR HOLMIUM 200 MICRON DISP